# Patient Record
Sex: MALE | Race: WHITE | NOT HISPANIC OR LATINO | Employment: FULL TIME | URBAN - METROPOLITAN AREA
[De-identification: names, ages, dates, MRNs, and addresses within clinical notes are randomized per-mention and may not be internally consistent; named-entity substitution may affect disease eponyms.]

---

## 2017-02-02 ENCOUNTER — OFFICE VISIT (OUTPATIENT)
Dept: PHYSICAL MEDICINE AND REHAB | Facility: CLINIC | Age: 22
End: 2017-02-02

## 2017-02-02 VITALS
DIASTOLIC BLOOD PRESSURE: 70 MMHG | BODY MASS INDEX: 26.6 KG/M2 | HEIGHT: 71 IN | HEART RATE: 87 BPM | WEIGHT: 190 LBS | SYSTOLIC BLOOD PRESSURE: 120 MMHG

## 2017-02-02 DIAGNOSIS — G43.719 INTRACTABLE CHRONIC MIGRAINE WITHOUT AURA AND WITHOUT STATUS MIGRAINOSUS: Primary | ICD-10-CM

## 2017-02-02 ASSESSMENT — PAIN SCALES - GENERAL: PAINLEVEL: NO PAIN (0)

## 2017-02-02 NOTE — PROGRESS NOTES
"BOTULINUM TOXIN PROCEDURE - HEADACHE - NOTE    Chief Complaint   Patient presents with     Headache     BOTOX- CHRONIC MIGRAINES       /70 mmHg  Pulse 87  Ht 1.803 m (5' 11\")  Wt 86.183 kg (190 lb)  BMI 26.51 kg/m2       Current outpatient prescriptions:      OnabotulinumtoxinA (BOTOX IJ), Inject 135 Units into the muscle once Lot: /C3 Exp: 06/2019, Disp: , Rfl:      ONABOTULINUMTOXINA IJ, Inject 135 Units as directed Lot: [id number] Exp: Mar 2019, Disp: , Rfl:      SUMAtriptan (IMITREX STATDOSE) 6 MG/0.5ML pen injector kit, Inject 6 mg may repeat after 2 hours if needed; MAX 6 mg/dose and 12 mg/24 hours. Limit use to two days per week., Disp: 1 kit, Rfl: 6     promethazine (PHENERGAN) 25 MG suppository, Place 1 suppository (25 mg) rectally every 6 hours as needed for nausea, Disp: 30 suppository, Rfl: 6     methylPREDNISolone (MEDROL DOSEPAK) 4 MG tablet, Follow package instructions, Disp: 21 tablet, Rfl: 0     SUMAtriptan (IMITREX) 100 MG tablet, Take 1 tablet (100 mg) by mouth at onset of headache for migraine Take 1 tablet (100 mg) by mouth at onset of headache for migraine. May repeat in 2 hours. Max 200 mg in 24 hours. Limit use to 2 days per week., Disp: 12 tablet, Rfl: 3     OnabotulinumtoxinA (BOTOX IJ), Inject 135 Units as directed Lot# /C3, Exp 11/2018, Disp: , Rfl:      OnabotulinumtoxinA (BOTOX IJ), Inject 135 Units as directed Lot# /C3, Exp 04/2018, Disp: , Rfl:      OnabotulinumtoxinA (BOTOX IJ), Inject 125 Units into the muscle Lot # /C3  Exp: 1/2018, Disp: , Rfl:      OnabotulinumtoxinA (BOTOX IJ), Inject 135 Units as directed Lot# /C3, Exp 09/2017, Disp: , Rfl:      naproxen sodium (ANAPROX) 550 MG tablet, Take 1 tablet (550 mg) by mouth 2 times daily as needed for moderate pain, Disp: 30 tablet, Rfl: 0     ondansetron (ZOFRAN-ODT) 4 MG disintegrating tablet, Take 1 tablet (4 mg) by mouth every 8 hours as needed for nausea, Disp: 30 tablet, Rfl: 3     amitriptyline " (ELAVIL) 10 MG tablet, Take 1 tablet (10 mg) by mouth At Bedtime, Disp: 30 tablet, Rfl: 1     OnabotulinumtoxinA (BOTOX IJ), Inject 125 Units into the muscle Lot # /C3  Exp: 4/2017, Disp: , Rfl:      guaiFENesin-codeine (ROBITUSSIN AC) 100-10 MG/5ML SOLN, Take 10 mLs by mouth every 4 hours as needed for cough, Disp: 120 mL, Rfl: 0     OnabotulinumtoxinA (BOTOX IJ), Inject 125 Units as directed Lot# /C3, Exp 01/2017, Disp: , Rfl:      OnabotulinumtoxinA (BOTOX IJ), Inject 125 Units as directed Lot# /C3, Exp 10/2016, Disp: , Rfl:      No Known Allergies     PHYSICAL EXAM:    Denies headache today.  Last headache was Saturday rated 7/10.  Took IM Imitrex.  Resolved.    HPI:    Patient reports the following new medical problems since last visit: Cold, resolved.    We reviewed the recommended safety guidelines for  Botox from any vaccine injection, such as the seasonal flu vaccine, by a minimum of 10-14 days with Jose Eduardo Ryan. He acknowledged understanding.    RESPONSE TO PREVIOUS TREATMENT:  Change in headache pattern following last series of injections with 125 units of  Botox on 11/9/2016    No problems reported    1.  Headache frequency during this injection cycle:  2016 headache days per month.  This is compared to his baseline headache frequency of daily headache days per month.     2.  Headache duration during this injection cycle:  Headache duration ranged from 2  hours to 4  hours.     3.  Headache intensity during this injection cycle:    A.  5/10  =  Typical pain level.  B.  10/10  =  Worst pain level.  C.  0/10  =  Lowest pain level.    4.  Change in headache medication usage during this injection cycle:  (For Example:  Able to decrease use of oral pain medications.) Same use of oral meds as usual.    5.  ER Visits During This Injection Cycle:  1    6.  Functional Performance:  Change in ADL's, social interaction, days lost from work, etc. Had to cancel some activities due to  headache.    BOTULINUM NEUROTOXIN INJECTION PROCEDURES:      VERIFICATION OF PATIENT IDENTIFICATION AND PROCEDURE     Initials   Patient Name lmd   Patient  lmd   Procedure Verified by: lmd     Prior to the start of the procedure and with procedural staff participation, I verbally confirmed the patient s identity using two indicators, relevant allergies, that the procedure was appropriate and matched the consent or emergent situation, and that the correct equipment/implants were available. Immediately prior to starting the procedure I conducted the Time Out with the procedural staff and re-confirmed the patient s name, procedure, and site/side. (The Joint Commission universal protocol was followed.)  Yes    Sedation (Moderate or Deep): None    Above assessments performed by:  Iwona Lopez RN Care Coordinator    Errol Coy MD      INDICATIONS FOR PROCEDURES:  Jose Eduardo Ryan is a 21 year old male patient with chronic migraine headaches associated with cervicogenic components.  Jose Eduardo reports overall improvement in his migraine headache pattern, with fewer, less intense headaches since starting Botox.    His baseline symptoms have been recalcitrant to oral medications and conservative therapy. He is here today for reinjection with Botox.    GOAL OF PROCEDURE:  The goal of this procedure is to decrease pain .    TOTAL DOSE ADMINISTERED:  Dose Administered:  135 units  Botox (Botulinum Toxin Type A)       2:1 Dilution   Diluent Used:  0.5% Sensorcaine  Total Volume of Diluent Used:  3.70 ml  Lot # /C3 with Expiration Date:  2019  NDC #: Botox 100u ([address]-35)    Medication guide was offered to patient and was declined.    CONSENT:  The risks, benefits, and treatment options were discussed with Jose Eduardo Ryan and he agreed to proceed.    Written consent was obtained by lmd.     EQUIPMENT USED:  Needle-37mm stimulating/recording  Needle-30 gauge  EMG/NCS Machine    SKIN PREPARATION:  Skin preparation  was performed using an alcohol wipe.    GUIDANCE DESCRIPTION:  Electro-myographic guidance was necessary throughout the cervical portion of the procedure to accurately identify all areas of spastic muscles while avoiding injection of non-spastic muscles, neighboring nerves and nearby vascular structures.     AREA/MUSCLE INJECTED:  135 units of Botox  1 & 2. SHOULDER GIRDLE & NECK MUSCLES: 25 units Botox = Total Dose, 2:1 Dilution   Right Middle Trapezius (mid cervical) - 2.5 units of Botox at 1 site/s.   Left Middle Trapezius (mid cervical) - 2.5 units of Botox at 1 site/s.     Right Splenius - 5 units of Botox at 1 site/s.   Left Splenius - 5 units of Botox at 1 site/s.     Right Levator Scapulae - 5 units of Botox at 1 site/s (shoulder muscles).   Left Levator Scapulae - 5 units of Botox at 1 site/s (shoulder muscles).    3. HEAD & SCALP MUSCLES: 110 units Botox = Total Dose, 2:1 Dilution  Right Frontalis - 20 units of Botox at 4 site/s.  Left Frontalis - 20 units of Botox at 4 site/s.    Right Temporalis - 25 units of Botox at 6 site/s.  Left Temporalis - 25 units of Botox at 6 site/s.    Right  - 5 units of Botox at 1 site/s.              Left  - 5 units of Botox at 1 site/s.    Procerus - 10 units of Botox at 1 site/s.    RESPONSE TO PROCEDURE:  Jose Eduardo Ryan tolerated the procedure well and there were no immediate complications.   He was allowed to recover for an appropriate period of time and was discharged home in stable condition.    FOLLOW UP:  Jose Eduardo Ryan was asked to follow up by phone in 7-14 days with Sharon Ding PT, Care Coordinator or Iwona Chris RN, Care Coordinator, to report his response to this series of injections.  Based on the patient's previous response to this therapy, Jose Eduardo Ryan was rescheduled for the next series of injections in 12 weeks.    PLAN (Medication Changes, Therapy Orders, Work or Disability Issues, etc.): Patient will continue to monitor  response to today's injections.

## 2017-02-02 NOTE — Clinical Note
"2/2/2017       RE: Jose Eduardo Ryan  [address] [place] [address]     Dear Colleague,    Thank you for referring your patient, Jose Eduardo Ryan, to the [facility] at [facility]. Please see a copy of my visit note below.    BOTULINUM TOXIN PROCEDURE - HEADACHE - NOTE    Chief Complaint   Patient presents with     Headache     BOTOX- CHRONIC MIGRAINES       /70 mmHg  Pulse 87  Ht 1.803 m (5' 11\")  Wt 86.183 kg (190 lb)  BMI 26.51 kg/m2       Current outpatient prescriptions:      OnabotulinumtoxinA (BOTOX IJ), Inject 135 Units into the muscle once Lot: /C3 Exp: 06/2019, Disp: , Rfl:      ONABOTULINUMTOXINA IJ, Inject 135 Units as directed Lot: [id number] Exp: Mar 2019, Disp: , Rfl:      SUMAtriptan (IMITREX STATDOSE) 6 MG/0.5ML pen injector kit, Inject 6 mg may repeat after 2 hours if needed; MAX 6 mg/dose and 12 mg/24 hours. Limit use to two days per week., Disp: 1 kit, Rfl: 6     promethazine (PHENERGAN) 25 MG suppository, Place 1 suppository (25 mg) rectally every 6 hours as needed for nausea, Disp: 30 suppository, Rfl: 6     methylPREDNISolone (MEDROL DOSEPAK) 4 MG tablet, Follow package instructions, Disp: 21 tablet, Rfl: 0     SUMAtriptan (IMITREX) 100 MG tablet, Take 1 tablet (100 mg) by mouth at onset of headache for migraine Take 1 tablet (100 mg) by mouth at onset of headache for migraine. May repeat in 2 hours. Max 200 mg in 24 hours. Limit use to 2 days per week., Disp: 12 tablet, Rfl: 3     OnabotulinumtoxinA (BOTOX IJ), Inject 135 Units as directed Lot# /C3, Exp 11/2018, Disp: , Rfl:      OnabotulinumtoxinA (BOTOX IJ), Inject 135 Units as directed Lot# /C3, Exp 04/2018, Disp: , Rfl:      OnabotulinumtoxinA (BOTOX IJ), Inject 125 Units into the muscle Lot # /C3  Exp: 1/2018, Disp: , Rfl:      OnabotulinumtoxinA (BOTOX IJ), Inject 135 Units as directed Lot# /C3, Exp 09/2017, Disp: , Rfl:      naproxen " sodium (ANAPROX) 550 MG tablet, Take 1 tablet (550 mg) by mouth 2 times daily as needed for moderate pain, Disp: 30 tablet, Rfl: 0     ondansetron (ZOFRAN-ODT) 4 MG disintegrating tablet, Take 1 tablet (4 mg) by mouth every 8 hours as needed for nausea, Disp: 30 tablet, Rfl: 3     amitriptyline (ELAVIL) 10 MG tablet, Take 1 tablet (10 mg) by mouth At Bedtime, Disp: 30 tablet, Rfl: 1     OnabotulinumtoxinA (BOTOX IJ), Inject 125 Units into the muscle Lot # /C3  Exp: 4/2017, Disp: , Rfl:      guaiFENesin-codeine (ROBITUSSIN AC) 100-10 MG/5ML SOLN, Take 10 mLs by mouth every 4 hours as needed for cough, Disp: 120 mL, Rfl: 0     OnabotulinumtoxinA (BOTOX IJ), Inject 125 Units as directed Lot# /C3, Exp 01/2017, Disp: , Rfl:      OnabotulinumtoxinA (BOTOX IJ), Inject 125 Units as directed Lot# /C3, Exp 10/2016, Disp: , Rfl:      No Known Allergies     PHYSICAL EXAM:    Denies headache today.  Last headache was Saturday rated 7/10.  Took IM Imitrex.  Resolved.    HPI:    Patient reports the following new medical problems since last visit: Cold, resolved.    We reviewed the recommended safety guidelines for  Botox from any vaccine injection, such as the seasonal flu vaccine, by a minimum of 10-14 days with Jose Eduardo Ryan. He acknowledged understanding.    RESPONSE TO PREVIOUS TREATMENT:  Change in headache pattern following last series of injections with 125 units of  Botox on 11/9/2016    No problems reported    1.  Headache frequency during this injection cycle:  2016 headache days per month.  This is compared to his baseline headache frequency of daily headache days per month.     2.  Headache duration during this injection cycle:  Headache duration ranged from 2  hours to 4  hours.     3.  Headache intensity during this injection cycle:    A.  5/10  =  Typical pain level.  B.  10/10  =  Worst pain level.  C.  0/10  =  Lowest pain level.    4.  Change in headache medication usage during this  injection cycle:  (For Example:  Able to decrease use of oral pain medications.) Same use of oral meds as usual.    5.  ER Visits During This Injection Cycle:  1    6.  Functional Performance:  Change in ADL's, social interaction, days lost from work, etc. Had to cancel some activities due to headache.    BOTULINUM NEUROTOXIN INJECTION PROCEDURES:      VERIFICATION OF PATIENT IDENTIFICATION AND PROCEDURE     Initials   Patient Name lmd   Patient  lmd   Procedure Verified by: lmd     Prior to the start of the procedure and with procedural staff participation, I verbally confirmed the patient s identity using two indicators, relevant allergies, that the procedure was appropriate and matched the consent or emergent situation, and that the correct equipment/implants were available. Immediately prior to starting the procedure I conducted the Time Out with the procedural staff and re-confirmed the patient s name, procedure, and site/side. (The Joint Commission universal protocol was followed.)  Yes    Sedation (Moderate or Deep): None    Above assessments performed by:  Iwona Lopez RN Care Coordinator    Errol Coy MD      INDICATIONS FOR PROCEDURES:  Jose Eduardo Ryan is a 21 year old male patient with chronic migraine headaches associated with cervicogenic components.  Jose Eduardo reports overall improvement in his migraine headache pattern, with fewer, less intense headaches since starting Botox.    His baseline symptoms have been recalcitrant to oral medications and conservative therapy. He is here today for reinjection with Botox.    GOAL OF PROCEDURE:  The goal of this procedure is to decrease pain .    TOTAL DOSE ADMINISTERED:  Dose Administered:  135 units  Botox (Botulinum Toxin Type A)       2:1 Dilution   Diluent Used:  0.5% Sensorcaine  Total Volume of Diluent Used:  3.70 ml  Lot # /C3 with Expiration Date:  2019  NDC #: Botox 100u ([address]-37)    Medication guide was offered to patient and was  declined.    CONSENT:  The risks, benefits, and treatment options were discussed with Jose Eduardo Ryan and he agreed to proceed.    Written consent was obtained by lmd.     EQUIPMENT USED:  Needle-37mm stimulating/recording  Needle-30 gauge  EMG/NCS Machine    SKIN PREPARATION:  Skin preparation was performed using an alcohol wipe.    GUIDANCE DESCRIPTION:  Electro-myographic guidance was necessary throughout the cervical portion of the procedure to accurately identify all areas of spastic muscles while avoiding injection of non-spastic muscles, neighboring nerves and nearby vascular structures.     AREA/MUSCLE INJECTED:  135 units of Botox  1 & 2. SHOULDER GIRDLE & NECK MUSCLES: 25 units Botox = Total Dose, 2:1 Dilution   Right Middle Trapezius (mid cervical) - 2.5 units of Botox at 1 site/s.   Left Middle Trapezius (mid cervical) - 2.5 units of Botox at 1 site/s.     Right Splenius - 5 units of Botox at 1 site/s.   Left Splenius - 5 units of Botox at 1 site/s.     Right Levator Scapulae - 5 units of Botox at 1 site/s (shoulder muscles).   Left Levator Scapulae - 5 units of Botox at 1 site/s (shoulder muscles).    3. HEAD & SCALP MUSCLES: 110 units Botox = Total Dose, 2:1 Dilution  Right Frontalis - 20 units of Botox at 4 site/s.  Left Frontalis - 20 units of Botox at 4 site/s.    Right Temporalis - 25 units of Botox at 6 site/s.  Left Temporalis - 25 units of Botox at 6 site/s.    Right  - 5 units of Botox at 1 site/s.              Left  - 5 units of Botox at 1 site/s.    Procerus - 10 units of Botox at 1 site/s.    RESPONSE TO PROCEDURE:  Jose Eduardo Ryan tolerated the procedure well and there were no immediate complications.   He was allowed to recover for an appropriate period of time and was discharged home in stable condition.    FOLLOW UP:  Jose Eduardo Ryan was asked to follow up by phone in 7-14 days with Sharon Ding PT, Care Coordinator or Iwona Chris RN, Care Coordinator, to  report his response to this series of injections.  Based on the patient's previous response to this therapy, Jose Eduardo Ryan was rescheduled for the next series of injections in 12 weeks.    PLAN (Medication Changes, Therapy Orders, Work or Disability Issues, etc.): Patient will continue to monitor response to today's injections.    Again, thank you for allowing me to participate in the care of your patient.      Sincerely,    Errol Coy MD

## 2017-02-02 NOTE — NURSING NOTE
Chief Complaint   Patient presents with     RECHECK     BOTOX- CHRONIC MIGRAINES     JERONIMO ELIZALDE

## 2017-03-17 DIAGNOSIS — G43.111 INTRACTABLE MIGRAINE WITH AURA WITH STATUS MIGRAINOSUS: ICD-10-CM

## 2017-03-17 RX ORDER — CEFUROXIME AXETIL 250 MG/1
TABLET ORAL
Qty: 1 KIT | Refills: 11 | Status: SHIPPED | OUTPATIENT
Start: 2017-03-17 | End: 2017-07-13

## 2017-04-05 ENCOUNTER — TELEPHONE (OUTPATIENT)
Dept: NEUROLOGY | Facility: CLINIC | Age: 22
End: 2017-04-05

## 2017-04-05 ENCOUNTER — CARE COORDINATION (OUTPATIENT)
Dept: PHYSICAL MEDICINE AND REHAB | Facility: CLINIC | Age: 22
End: 2017-04-05

## 2017-04-05 DIAGNOSIS — G43.101 MIGRAINE WITH AURA AND WITH STATUS MIGRAINOSUS, NOT INTRACTABLE: Primary | ICD-10-CM

## 2017-04-05 RX ORDER — METHYLPREDNISOLONE 4 MG
4 TABLET, DOSE PACK ORAL SEE ADMIN INSTRUCTIONS
Qty: 21 TABLET | Refills: 0
Start: 2017-04-05 | End: 2017-07-13

## 2017-04-05 NOTE — PROGRESS NOTES
Received call from Pt's mother Lo reporting Pt has had severe migraine headache for the last few days and, in spite of using Imitrex, has had no relief.  Pt is now vomiting from excessive pain.  Lo requests an order for Medrol Dose Baldemar as this has broken his headache cycle before.  Tanna Menendez was not available so script authorized by Dr. Errol Coy for Medrol Dose Baldemar, 4 mg, 21 tablets take as instructed and was called to [facility] [place] [phone].

## 2017-04-06 ENCOUNTER — TELEPHONE (OUTPATIENT)
Dept: NEUROLOGY | Facility: CLINIC | Age: 22
End: 2017-04-06

## 2017-04-06 NOTE — TELEPHONE ENCOUNTER
Called and spoke to Lo, patient's mother who called our Clinic on 4/5/2016 reporting Jose Eduardo's headache worsening. Medrol pack was prescribed by Dr Coy yesterday. Patient took his first dose of Medrol pack and headache is better so he was able to go to work. Patient's mother appreciates the call.

## 2017-04-27 ENCOUNTER — OFFICE VISIT (OUTPATIENT)
Dept: PHYSICAL MEDICINE AND REHAB | Facility: CLINIC | Age: 22
End: 2017-04-27

## 2017-04-27 VITALS
SYSTOLIC BLOOD PRESSURE: 143 MMHG | WEIGHT: 193 LBS | BODY MASS INDEX: 27.02 KG/M2 | HEIGHT: 71 IN | DIASTOLIC BLOOD PRESSURE: 86 MMHG | HEART RATE: 83 BPM

## 2017-04-27 DIAGNOSIS — G43.719 INTRACTABLE CHRONIC MIGRAINE WITHOUT AURA AND WITHOUT STATUS MIGRAINOSUS: Primary | ICD-10-CM

## 2017-04-27 ASSESSMENT — PAIN SCALES - GENERAL: PAINLEVEL: NO PAIN (0)

## 2017-04-27 NOTE — MR AVS SNAPSHOT
After Visit Summary   4/27/2017    Jose Eduardo Ryan    MRN: [id number]           Patient Information     Date Of Birth          1995        Visit Information        Provider Department      4/27/2017 12:30 PM Errol Coy MD [facility] Physical Medicine and Rehabilitation         Follow-ups after your visit        Follow-up notes from your care team     Return in about 12 weeks (around 7/20/2017) for Headache.      Your next 10 appointments already scheduled     Jul 13, 2017  1:10 PM CDT   (Arrive by 12:55 PM)   Return Botox with Errol Coy MD   [facility] Physical Medicine and Rehabilitation ([facility])    [address] [place] [id number] [phone]            Oct 05, 2017  1:10 PM CDT   (Arrive by 12:55 PM)   Return Botox with Errol Coy MD   [facility] Rehabilitation ([facility])    [address] [place] [id number] [phone]              Who to contact     Please call your clinic at [phone] to:    Ask questions about your health    Make or cancel appointments    Discuss your medicines    Learn about your test results    Speak to your doctor   If you have compliments or concerns about an experience at your clinic, or if you wish to file a complaint, please contact [facility] Patient Relations at [phone] or email us at [email or web address]         Additional Information About Your Visit        MyChart Information     ApiFix is an electronic gateway that provides easy, online access to your medical records. With ApiFix, you can request a clinic appointment, read your test results, renew a prescription or communicate with your care team.     To sign up for [employer] visit the website at [email or web address]   You will be asked to enter the access code listed below, as well as some personal  "information. Please follow the directions to create your username and password.     Your access code is: [email or web address]  Expires: 2017  1:03 PM     Your access code will  in 90 days. If you need help or a new code, please contact your [facility] or call [phone] for assistance.        Care EveryWhere ID     This is your Care EveryWhere ID. This could be used by other organizations to access your [facility] medical records  [id number]        Your Vitals Were     Pulse Height BMI (Body Mass Index)             83 1.803 m (5' 11\") 26.92 kg/m2          Blood Pressure from Last 3 Encounters:   17 143/86   17 120/70   16 123/74    Weight from Last 3 Encounters:   17 87.5 kg (193 lb)   17 86.2 kg (190 lb)   16 84.3 kg (185 lb 14.4 oz)              Today, you had the following     No orders found for display       Primary Care Provider    [facility]       No address on file        Thank you!     Thank you for choosing [facility] PHYSICAL MEDICINE AND REHABILITATION  for your care. Our goal is always to provide you with excellent care. Hearing back from our patients is one way we can continue to improve our services. Please take a few minutes to complete the written survey that you may receive in the mail after your visit with us. Thank you!             Your Updated Medication List - Protect others around you: Learn how to safely use, store and throw away your medicines at www.disposemymeds.org.          This list is accurate as of: 17  1:03 PM.  Always use your most recent med list.                   Brand Name Dispense Instructions for use    amitriptyline 10 MG tablet    ELAVIL    30 tablet    Take 1 tablet (10 mg) by mouth At Bedtime       * BOTOX IJ      Inject 125 Units as directed Lot# /C3, Exp 10/2016       * BOTOX IJ      Inject 125 Units as directed Lot# /C3, Exp 2017       * BOTOX IJ      Inject 125 " Units into the muscle Lot # /C3  Exp: 4/2017       * BOTOX IJ      Inject 135 Units as directed Lot# /C3, Exp 09/2017       * BOTOX IJ      Inject 125 Units into the muscle Lot # /C3  Exp: 1/2018       * BOTOX IJ      Inject 135 Units as directed Lot# /C3, Exp 04/2018       * BOTOX IJ      Inject 135 Units as directed Lot# /C3, Exp 11/2018       * ONABOTULINUMTOXINA IJ      Inject 135 Units as directed Lot: [id number] Exp: Mar 2019       * BOTOX IJ      Inject 135 Units into the muscle once Lot: /C3 Exp: 06/2019       * BOTOX IJ      Inject 135 Units into the muscle Lot 3 /C3 Exp: 8/2019       * BOTOX IJ      Inject 135 Units into the muscle Lot # /C3  Exp: 10/2019       guaiFENesin-codeine 100-10 MG/5ML Soln solution    ROBITUSSIN AC    120 mL    Take 10 mLs by mouth every 4 hours as needed for cough       * methylPREDNISolone 4 MG tablet    MEDROL DOSEPAK    21 tablet    Follow package instructions       * methylPREDNISolone 4 MG tablet    MEDROL    21 tablet    Take 1 tablet (4 mg) by mouth See Admin Instructions follow package directions       naproxen sodium 550 MG tablet    ANAPROX    30 tablet    Take 1 tablet (550 mg) by mouth 2 times daily as needed for moderate pain       ondansetron 4 MG ODT tab    ZOFRAN-ODT    30 tablet    Take 1 tablet (4 mg) by mouth every 8 hours as needed for nausea       promethazine 25 MG Suppository    PHENERGAN    30 suppository    Place 1 suppository (25 mg) rectally every 6 hours as needed for nausea       * SUMAtriptan 100 MG tablet    IMITREX    12 tablet    Take 1 tablet (100 mg) by mouth at onset of headache for migraine Take 1 tablet (100 mg) by mouth at onset of headache for migraine. May repeat in 2 hours. Max 200 mg in 24 hours. Limit use to 2 days per week.       * SUMAtriptan 6 MG/0.5ML pen injector kit    IMITREX STATDOSE    1 kit    Inject 6 mg may repeat after 2 hours if needed; MAX 6 mg/dose and 12 mg/24 hours. Limit use to no  more than two days per week.       * Notice:  This list has 15 medication(s) that are the same as other medications prescribed for you. Read the directions carefully, and ask your doctor or other care provider to review them with you.

## 2017-04-27 NOTE — PROGRESS NOTES
"BOTULINUM TOXIN PROCEDURE - HEADACHE - NOTE    Chief Complaint   Patient presents with     Headache     BOTOX Migraine Headache       /86  Pulse 83  Ht 1.803 m (5' 11\")  Wt 87.5 kg (193 lb)  BMI 26.92 kg/m2       Current Outpatient Prescriptions:      methylPREDNISolone (MEDROL) 4 MG tablet, Take 1 tablet (4 mg) by mouth See Admin Instructions follow package directions, Disp: 21 tablet, Rfl: 0     SUMAtriptan (IMITREX STATDOSE) 6 MG/0.5ML pen injector kit, Inject 6 mg may repeat after 2 hours if needed; MAX 6 mg/dose and 12 mg/24 hours. Limit use to no more than two days per week., Disp: 1 kit, Rfl: 11     OnabotulinumtoxinA (BOTOX IJ), Inject 135 Units into the muscle Lot 3 /C3 Exp: 8/2019, Disp: , Rfl:      OnabotulinumtoxinA (BOTOX IJ), Inject 135 Units into the muscle once Lot: /C3 Exp: 06/2019, Disp: , Rfl:      ONABOTULINUMTOXINA IJ, Inject 135 Units as directed Lot: [id number] Exp: Mar 2019, Disp: , Rfl:      promethazine (PHENERGAN) 25 MG suppository, Place 1 suppository (25 mg) rectally every 6 hours as needed for nausea, Disp: 30 suppository, Rfl: 6     methylPREDNISolone (MEDROL DOSEPAK) 4 MG tablet, Follow package instructions, Disp: 21 tablet, Rfl: 0     SUMAtriptan (IMITREX) 100 MG tablet, Take 1 tablet (100 mg) by mouth at onset of headache for migraine Take 1 tablet (100 mg) by mouth at onset of headache for migraine. May repeat in 2 hours. Max 200 mg in 24 hours. Limit use to 2 days per week., Disp: 12 tablet, Rfl: 3     OnabotulinumtoxinA (BOTOX IJ), Inject 135 Units as directed Lot# /C3, Exp 11/2018, Disp: , Rfl:      OnabotulinumtoxinA (BOTOX IJ), Inject 135 Units as directed Lot# /C3, Exp 04/2018, Disp: , Rfl:      OnabotulinumtoxinA (BOTOX IJ), Inject 125 Units into the muscle Lot # /C3  Exp: 1/2018, Disp: , Rfl:      OnabotulinumtoxinA (BOTOX IJ), Inject 135 Units as directed Lot# /C3, Exp 09/2017, Disp: , Rfl:      naproxen sodium (ANAPROX) 550 MG tablet, " Take 1 tablet (550 mg) by mouth 2 times daily as needed for moderate pain, Disp: 30 tablet, Rfl: 0     ondansetron (ZOFRAN-ODT) 4 MG disintegrating tablet, Take 1 tablet (4 mg) by mouth every 8 hours as needed for nausea, Disp: 30 tablet, Rfl: 3     amitriptyline (ELAVIL) 10 MG tablet, Take 1 tablet (10 mg) by mouth At Bedtime, Disp: 30 tablet, Rfl: 1     OnabotulinumtoxinA (BOTOX IJ), Inject 125 Units into the muscle Lot # /C3  Exp: 4/2017, Disp: , Rfl:      guaiFENesin-codeine (ROBITUSSIN AC) 100-10 MG/5ML SOLN, Take 10 mLs by mouth every 4 hours as needed for cough, Disp: 120 mL, Rfl: 0     OnabotulinumtoxinA (BOTOX IJ), Inject 125 Units as directed Lot# /C3, Exp 01/2017, Disp: , Rfl:      OnabotulinumtoxinA (BOTOX IJ), Inject 125 Units as directed Lot# /C3, Exp 10/2016, Disp: , Rfl:      No Known Allergies     PHYSICAL EXAM:    Pt denies headache today.  Last migraine headache 2 weeks ago, rated 9/10 took Imitrex with relief.    HPI:    Patient denies new medical diagnoses, illnesses, hospitalizations, emergency room visits, and injuries since the previous injection with botulinum neurotoxin.    We reviewed the recommended safety guidelines for  Botox from any vaccine injection, such as the seasonal flu vaccine, by a minimum of 10-14 days with Jose Eduardo Ryan. He acknowledged understanding.    RESPONSE TO PREVIOUS TREATMENT:  Change in headache pattern following last series of injections with 135 units of  Botox on 2/2/2017.  Pt reports regularly he has good relief up until approx 3-4 weeks before next Botox injections where, for about 2 weeks, headache frequency and intensity increase.  Then headaches again subside until he returns for subsequent Botox injections.      Post-procedural headache: Rated as 'Mild' severity.  Duration: few  days resolved   Injection site pain rates mild lasts a few days then resolves.     1.  Headache frequency during this injection cycle:  3-4 headache days  per month.  This is compared to his baseline headache frequency of daily headache days per month.     2.  Headache duration during this injection cycle:  Headache duration ranged from 1 hours to 8-9 hours.      3.  Headache intensity during this injection cycle:    A.  5/10  =  Typical pain level.  B.  9/10  =  Worst pain level.  C.  0/10  =  Lowest pain level.    4.  Change in headache medication usage during this injection cycle:  (For Example:  Able to decrease use of oral pain medications.) No change in use of pain medication    5.  ER Visits During This Injection Cycle:  0    6.  Functional Performance:  Change in ADL's, social interaction, days lost from work, etc. Patient reports 1 days lost from work due to headache during this injection cycle  Had to leave early from work one day.    BOTULINUM NEUROTOXIN INJECTION PROCEDURES:      VERIFICATION OF PATIENT IDENTIFICATION AND PROCEDURE     Initials   Patient Name lmd   Patient  lmd   Procedure Verified by: nicolas     Prior to the start of the procedure and with procedural staff participation, I verbally confirmed the patient s identity using two indicators, relevant allergies, that the procedure was appropriate and matched the consent or emergent situation, and that the correct equipment/implants were available. Immediately prior to starting the procedure I conducted the Time Out with the procedural staff and re-confirmed the patient s name, procedure, and site/side. (The Joint Commission universal protocol was followed.)  Yes    Sedation (Moderate or Deep): None    Above assessments performed by:  Iwona Lopez RN Care Coordinator    Errol Coy MD      INDICATIONS FOR PROCEDURES:  Jose Eduardo Ryan is a 22 year old male patient with chronic migraine headaches associated with cervicogenic components.  Jose Eduardo reports overall improvement in his migraine headache pattern, with fewer, less intense headaches since starting Botox.     His baseline symptoms have  been recalcitrant to oral medications and conservative therapy. He is here today for reinjection with Botox.     GOAL OF PROCEDURE:  The goal of this procedure is to decrease pain .    TOTAL DOSE ADMINISTERED:  Dose Administered:  135 units  Botox (Botulinum Toxin Type A)       2:1 Dilution   Diluent Used:  0.5% Sensorcaine -  (NDC:5877-6026-23) (Lot # [id number], Exp: 8/1/2018)  Total Volume of Diluent Used:  2.70 ml  Lot # /C3 with Expiration Date:  10/2019  NDC #: Botox 100u ([address]-60)    Medication guide was offered to patient and was declined.    CONSENT:  The risks, benefits, and treatment options were discussed with Jose Eduardo Ryan and he agreed to proceed.    Written consent was obtained by lmd.     EQUIPMENT USED:  Needle-37mm stimulating/recording  Needle-30 gauge  EMG/NCS Machine     SKIN PREPARATION:  Skin preparation was performed using an alcohol wipe.     GUIDANCE DESCRIPTION:  Electro-myographic guidance was necessary throughout the cervical portion of the procedure to accurately identify all areas of spastic muscles while avoiding injection of non-spastic muscles, neighboring nerves and nearby vascular structures.     AREA/MUSCLE INJECTED:  135 Units  1 & 2. SHOULDER GIRDLE & NECK MUSCLES: 25 units Botox = Total Dose, 2:1 Dilution   Right Middle Trapezius (mid cervical) - 2.5 units of Botox at 1 site/s.   Left Middle Trapezius (mid cervical) - 2.5 units of Botox at 1 site/s.      Right Splenius - 5 units of Botox at 1 site/s.   Left Splenius - 5 units of Botox at 1 site/s.      Right Levator Scapulae - 5 units of Botox at 1 site/s (shoulder muscles).   Left Levator Scapulae - 5 units of Botox at 1 site/s (shoulder muscles).     3. HEAD & SCALP MUSCLES: 110 units Botox = Total Dose, 2:1 Dilution  Right Frontalis - 20 units of Botox at 4 site/s.  Left Frontalis - 20 units of Botox at 4 site/s.     Right Temporalis - 25 units of Botox at 6 site/s.  Left Temporalis - 25 units of Botox at 6  site/s.     Right  - 5 units of Botox at 1 site/s.              Left  - 5 units of Botox at 1 site/s.     Procerus - 10 units of Botox at 1 site/s.    RESPONSE TO PROCEDURE:  Jose Eduardo Ryan tolerated the procedure well and there were no immediate complications.   He was allowed to recover for an appropriate period of time and was discharged home in stable condition.    FOLLOW UP:  Jose Eduardo Ryan was asked to follow up by phone in 7-14 days with Sharon Ding PT, Care Coordinator or Iwona Chris RN, Care Coordinator, to report his response to this series of injections.  Based on the patient's previous response to this therapy, Jose Eduardo Ryan was rescheduled for the next series of injections in 12 weeks.    PLAN (Medication Changes, Therapy Orders, Work or Disability Issues, etc.): Patient will continue to monitor response to today's injections.     There were 15 units of Botox as unavoidable waste for this patient.

## 2017-04-27 NOTE — LETTER
"4/27/2017       RE: Jose Eduardo Ryan  [address] [place] [address]     Dear Colleague,    Thank you for referring your patient, Jose Eduardo Ryan, to the [facility] at [facility]. Please see a copy of my visit note below.    BOTULINUM TOXIN PROCEDURE - HEADACHE - NOTE    Chief Complaint   Patient presents with     Headache     BOTOX Migraine Headache       /86  Pulse 83  Ht 1.803 m (5' 11\")  Wt 87.5 kg (193 lb)  BMI 26.92 kg/m2       Current Outpatient Prescriptions:      methylPREDNISolone (MEDROL) 4 MG tablet, Take 1 tablet (4 mg) by mouth See Admin Instructions follow package directions, Disp: 21 tablet, Rfl: 0     SUMAtriptan (IMITREX STATDOSE) 6 MG/0.5ML pen injector kit, Inject 6 mg may repeat after 2 hours if needed; MAX 6 mg/dose and 12 mg/24 hours. Limit use to no more than two days per week., Disp: 1 kit, Rfl: 11     OnabotulinumtoxinA (BOTOX IJ), Inject 135 Units into the muscle Lot 3 /C3 Exp: 8/2019, Disp: , Rfl:      OnabotulinumtoxinA (BOTOX IJ), Inject 135 Units into the muscle once Lot: /C3 Exp: 06/2019, Disp: , Rfl:      ONABOTULINUMTOXINA IJ, Inject 135 Units as directed Lot: [id number] Exp: Mar 2019, Disp: , Rfl:      promethazine (PHENERGAN) 25 MG suppository, Place 1 suppository (25 mg) rectally every 6 hours as needed for nausea, Disp: 30 suppository, Rfl: 6     methylPREDNISolone (MEDROL DOSEPAK) 4 MG tablet, Follow package instructions, Disp: 21 tablet, Rfl: 0     SUMAtriptan (IMITREX) 100 MG tablet, Take 1 tablet (100 mg) by mouth at onset of headache for migraine Take 1 tablet (100 mg) by mouth at onset of headache for migraine. May repeat in 2 hours. Max 200 mg in 24 hours. Limit use to 2 days per week., Disp: 12 tablet, Rfl: 3     OnabotulinumtoxinA (BOTOX IJ), Inject 135 Units as directed Lot# /C3, Exp 11/2018, Disp: , Rfl:      OnabotulinumtoxinA (BOTOX IJ), Inject 135 Units as directed Lot# " /C3, Exp 04/2018, Disp: , Rfl:      OnabotulinumtoxinA (BOTOX IJ), Inject 125 Units into the muscle Lot # /C3  Exp: 1/2018, Disp: , Rfl:      OnabotulinumtoxinA (BOTOX IJ), Inject 135 Units as directed Lot# /C3, Exp 09/2017, Disp: , Rfl:      naproxen sodium (ANAPROX) 550 MG tablet, Take 1 tablet (550 mg) by mouth 2 times daily as needed for moderate pain, Disp: 30 tablet, Rfl: 0     ondansetron (ZOFRAN-ODT) 4 MG disintegrating tablet, Take 1 tablet (4 mg) by mouth every 8 hours as needed for nausea, Disp: 30 tablet, Rfl: 3     amitriptyline (ELAVIL) 10 MG tablet, Take 1 tablet (10 mg) by mouth At Bedtime, Disp: 30 tablet, Rfl: 1     OnabotulinumtoxinA (BOTOX IJ), Inject 125 Units into the muscle Lot # /C3  Exp: 4/2017, Disp: , Rfl:      guaiFENesin-codeine (ROBITUSSIN AC) 100-10 MG/5ML SOLN, Take 10 mLs by mouth every 4 hours as needed for cough, Disp: 120 mL, Rfl: 0     OnabotulinumtoxinA (BOTOX IJ), Inject 125 Units as directed Lot# /C3, Exp 01/2017, Disp: , Rfl:      OnabotulinumtoxinA (BOTOX IJ), Inject 125 Units as directed Lot# /C3, Exp 10/2016, Disp: , Rfl:      No Known Allergies     PHYSICAL EXAM:    Pt denies headache today.  Last migraine headache 2 weeks ago, rated 9/10 took Imitrex with relief.    HPI:    Patient denies new medical diagnoses, illnesses, hospitalizations, emergency room visits, and injuries since the previous injection with botulinum neurotoxin.    We reviewed the recommended safety guidelines for  Botox from any vaccine injection, such as the seasonal flu vaccine, by a minimum of 10-14 days with Jose Eduardo Ryan. He acknowledged understanding.    RESPONSE TO PREVIOUS TREATMENT:  Change in headache pattern following last series of injections with 135 units of  Botox on 2/2/2017.  Pt reports regularly he has good relief up until approx 3-4 weeks before next Botox injections where, for about 2 weeks, headache frequency and intensity increase.  Then  headaches again subside until he returns for subsequent Botox injections.      Post-procedural headache: Rated as 'Mild' severity.  Duration: few  days resolved   Injection site pain rates mild lasts a few days then resolves.     1.  Headache frequency during this injection cycle:  3-4 headache days per month.  This is compared to his baseline headache frequency of daily headache days per month.     2.  Headache duration during this injection cycle:  Headache duration ranged from 1 hours to 8-9 hours.      3.  Headache intensity during this injection cycle:    A.  5/10  =  Typical pain level.  B.  9/10  =  Worst pain level.  C.  0/10  =  Lowest pain level.    4.  Change in headache medication usage during this injection cycle:  (For Example:  Able to decrease use of oral pain medications.) No change in use of pain medication    5.  ER Visits During This Injection Cycle:  0    6.  Functional Performance:  Change in ADL's, social interaction, days lost from work, etc. Patient reports 1 days lost from work due to headache during this injection cycle  Had to leave early from work one day.    BOTULINUM NEUROTOXIN INJECTION PROCEDURES:      VERIFICATION OF PATIENT IDENTIFICATION AND PROCEDURE     Initials   Patient Name lmd   Patient  lmd   Procedure Verified by: nicolas     Prior to the start of the procedure and with procedural staff participation, I verbally confirmed the patient s identity using two indicators, relevant allergies, that the procedure was appropriate and matched the consent or emergent situation, and that the correct equipment/implants were available. Immediately prior to starting the procedure I conducted the Time Out with the procedural staff and re-confirmed the patient s name, procedure, and site/side. (The Joint Commission universal protocol was followed.)  Yes    Sedation (Moderate or Deep): None    Above assessments performed by:  Iwona Lopez RN Care Coordinator    Errol Coy  MD      INDICATIONS FOR PROCEDURES:  Jose Eduardo Ryan is a 22 year old male patient with chronic migraine headaches associated with cervicogenic components.  Jose Eduardo reports overall improvement in his migraine headache pattern, with fewer, less intense headaches since starting Botox.     His baseline symptoms have been recalcitrant to oral medications and conservative therapy. He is here today for reinjection with Botox.     GOAL OF PROCEDURE:  The goal of this procedure is to decrease pain .    TOTAL DOSE ADMINISTERED:  Dose Administered:  135 units  Botox (Botulinum Toxin Type A)       2:1 Dilution   Diluent Used:  0.5% Sensorcaine -  (NDC:4980-9623-72) (Lot # [id number], Exp: 8/1/2018)  Total Volume of Diluent Used:  2.70 ml  Lot # /C3 with Expiration Date:  10/2019  NDC #: Botox 100u ([address]-43)    Medication guide was offered to patient and was declined.    CONSENT:  The risks, benefits, and treatment options were discussed with Jose Eduardo Rayn and he agreed to proceed.    Written consent was obtained by lmd.     EQUIPMENT USED:  Needle-37mm stimulating/recording  Needle-30 gauge  EMG/NCS Machine     SKIN PREPARATION:  Skin preparation was performed using an alcohol wipe.     GUIDANCE DESCRIPTION:  Electro-myographic guidance was necessary throughout the cervical portion of the procedure to accurately identify all areas of spastic muscles while avoiding injection of non-spastic muscles, neighboring nerves and nearby vascular structures.     AREA/MUSCLE INJECTED:  135 Units  1 & 2. SHOULDER GIRDLE & NECK MUSCLES: 25 units Botox = Total Dose, 2:1 Dilution   Right Middle Trapezius (mid cervical) - 2.5 units of Botox at 1 site/s.   Left Middle Trapezius (mid cervical) - 2.5 units of Botox at 1 site/s.      Right Splenius - 5 units of Botox at 1 site/s.   Left Splenius - 5 units of Botox at 1 site/s.      Right Levator Scapulae - 5 units of Botox at 1 site/s (shoulder muscles).   Left Levator Scapulae - 5 units  of Botox at 1 site/s (shoulder muscles).     3. HEAD & SCALP MUSCLES: 110 units Botox = Total Dose, 2:1 Dilution  Right Frontalis - 20 units of Botox at 4 site/s.  Left Frontalis - 20 units of Botox at 4 site/s.     Right Temporalis - 25 units of Botox at 6 site/s.  Left Temporalis - 25 units of Botox at 6 site/s.     Right  - 5 units of Botox at 1 site/s.              Left  - 5 units of Botox at 1 site/s.     Procerus - 10 units of Botox at 1 site/s.    RESPONSE TO PROCEDURE:  Jose Eduardo Ryan tolerated the procedure well and there were no immediate complications.   He was allowed to recover for an appropriate period of time and was discharged home in stable condition.    FOLLOW UP:  Jose Eduardo Ryan was asked to follow up by phone in 7-14 days with Sharon Ding PT, Care Coordinator or Iwona Chris RN, Care Coordinator, to report his response to this series of injections.  Based on the patient's previous response to this therapy, Jose Eduardo Ryan was rescheduled for the next series of injections in 12 weeks.    PLAN (Medication Changes, Therapy Orders, Work or Disability Issues, etc.): Patient will continue to monitor response to today's injections.     There were 15 units of Botox as unavoidable waste for this patient.

## 2017-05-16 ENCOUNTER — TELEPHONE (OUTPATIENT)
Dept: NEUROLOGY | Facility: CLINIC | Age: 22
End: 2017-05-16

## 2017-05-16 NOTE — TELEPHONE ENCOUNTER
Called and spoke to the patient's mother who reported of Jose Eduardo's headache flare up. Called and spoke to Jose Eduardo who reported an acute migraine headache with onset this morning. Reports that he had headaches last week but this is the strongest so far and triggered by weather changes. Patient reports that he took sumatriptan injectable  this morning once which was not effective. Patient reports that he went to [facility] and refilled his sumatriptan and repeated sumatriptan injection. Now he reports that headache is slightly better after the second injection. Reports nausea and took promethazine suppository around 4-5 am. Did not repeat promethazine.   Recommended to repeat promethazine suppository 25 mg once, benadryl 25 mg and ibuprofen 800 mg once. Recommended trying to push fluids and saltines. Patient was asked to call us tomorrow if headache improved and follow up in the Clinic if no improvement. If no relief, will do ketorolac 30-60 mg IM tomorrow. Patient reports that ketorolac injection was effective in the past for him. Patient appreciates the call.

## 2017-05-18 ENCOUNTER — CARE COORDINATION (OUTPATIENT)
Dept: NEUROLOGY | Facility: CLINIC | Age: 22
End: 2017-05-18

## 2017-07-13 ENCOUNTER — OFFICE VISIT (OUTPATIENT)
Dept: NEUROLOGY | Facility: CLINIC | Age: 22
End: 2017-07-13

## 2017-07-13 ENCOUNTER — OFFICE VISIT (OUTPATIENT)
Dept: PHYSICAL MEDICINE AND REHAB | Facility: CLINIC | Age: 22
End: 2017-07-13

## 2017-07-13 VITALS — SYSTOLIC BLOOD PRESSURE: 122 MMHG | HEIGHT: 71 IN | DIASTOLIC BLOOD PRESSURE: 79 MMHG | HEART RATE: 67 BPM

## 2017-07-13 VITALS — SYSTOLIC BLOOD PRESSURE: 122 MMHG | HEART RATE: 67 BPM | HEIGHT: 71 IN | DIASTOLIC BLOOD PRESSURE: 79 MMHG

## 2017-07-13 DIAGNOSIS — G43.101 MIGRAINE WITH AURA AND WITH STATUS MIGRAINOSUS, NOT INTRACTABLE: Primary | ICD-10-CM

## 2017-07-13 DIAGNOSIS — G43.719 INTRACTABLE CHRONIC MIGRAINE WITHOUT AURA AND WITHOUT STATUS MIGRAINOSUS: Primary | ICD-10-CM

## 2017-07-13 DIAGNOSIS — G43.111 INTRACTABLE MIGRAINE WITH AURA WITH STATUS MIGRAINOSUS: ICD-10-CM

## 2017-07-13 RX ORDER — NAPROXEN 500 MG/1
500 TABLET ORAL EVERY 12 HOURS PRN
Qty: 60 TABLET | Refills: 3 | Status: SHIPPED | OUTPATIENT
Start: 2017-07-13 | End: 2018-05-04

## 2017-07-13 RX ORDER — PROMETHAZINE HYDROCHLORIDE 25 MG/1
25 SUPPOSITORY RECTAL EVERY 6 HOURS PRN
Qty: 30 SUPPOSITORY | Refills: 6 | Status: SHIPPED | OUTPATIENT
Start: 2017-07-13 | End: 2018-05-04

## 2017-07-13 RX ORDER — HYDROXYZINE PAMOATE 25 MG/1
25 CAPSULE ORAL EVERY 6 HOURS PRN
Qty: 20 CAPSULE | Refills: 1 | Status: SHIPPED | OUTPATIENT
Start: 2017-07-13 | End: 2017-12-28

## 2017-07-13 RX ORDER — SUMATRIPTAN SUCCINATE 6 MG/.5ML
6 INJECTION, SOLUTION SUBCUTANEOUS
Qty: 1 ML | Refills: 11 | Status: SHIPPED | OUTPATIENT
Start: 2017-07-13 | End: 2017-12-28

## 2017-07-13 RX ORDER — CEFUROXIME AXETIL 250 MG/1
TABLET ORAL
Qty: 1 KIT | Refills: 11 | Status: SHIPPED | OUTPATIENT
Start: 2017-07-13 | End: 2017-12-20

## 2017-07-13 ASSESSMENT — ENCOUNTER SYMPTOMS
MUSCLE CRAMPS: 0
WEAKNESS: 0
DISTURBANCES IN COORDINATION: 0
TREMORS: 0
JOINT SWELLING: 0
DIZZINESS: 1
LOSS OF CONSCIOUSNESS: 0
TINGLING: 0
MEMORY LOSS: 0
STIFFNESS: 0
MUSCLE WEAKNESS: 0
BACK PAIN: 0
PARALYSIS: 0
NECK PAIN: 1
HEADACHES: 1
ARTHRALGIAS: 0
SPEECH CHANGE: 0
SEIZURES: 0
MYALGIAS: 1
NUMBNESS: 0

## 2017-07-13 ASSESSMENT — PAIN SCALES - GENERAL
PAINLEVEL: NO PAIN (0)
PAINLEVEL: NO PAIN (0)

## 2017-07-13 NOTE — MR AVS SNAPSHOT
After Visit Summary   7/13/2017    Jose Eduardo Ryan    MRN: [id number]           Patient Information     Date Of Birth          1995        Visit Information        Provider Department      7/13/2017 11:00 AM Tanna Menendez APRN CNP [facility]        Today's Diagnoses     Migraine with aura and with status migrainosus, not intractable    -  1    Intractable migraine with aura with status migrainosus          Care Instructions    Plan:  Cefaly to research about.   Sumatriptan injectable as needed. May repeat in 2 hours. May take with promethazine suppository, hydroxyzine and naproxen delayed release as needed and instructed at the acute headache onset  May consider preventive treatment if headaches are more frequent or severe or need for ED treatment or or need for ED treatment or missing too much of your work  Quit smoking  Wean off caffeine  Continue Botox   Follow up in 3 months          Patient Education    Hydroxyzine Hydrochloride Oral solution    Hydroxyzine Hydrochloride Oral tablet    Hydroxyzine Hydrochloride Solution for injection    Hydroxyzine Pamoate Oral capsule    Hydroxyzine Pamoate Oral suspension  Hydroxyzine Hydrochloride Oral solution  What is this medicine?  HYDROXYZINE (nelly DROX i zeen) is an antihistamine. This medicine is used to treat allergy symptoms. It is also used to treat anxiety and tension. This medicine can be used with other medicines to induce sleep before surgery.  This medicine may be used for other purposes; ask your health care provider or pharmacist if you have questions.  What should I tell my health care provider before I take this medicine?  They need to know if you have any of these conditions:    any chronic illness    diabetes    difficulty passing urine    glaucoma    heart disease    kidney disease    liver disease    lung disease    an unusual or allergic reaction to hydroxyzine, cetirizine, other medicines, foods, dyes, or  preservatives    pregnant or trying to get pregnant    breast-feeding  How should I use this medicine?  Take this medicine by mouth with a full glass of water. Follow the directions on the prescription label. Use a specially marked spoon or dropper to measure every dose. Ask your pharmacist if you do not have one. Household spoons are not accurate. You may take this medicine with food or on an empty stomach. Take your medicine at regular intervals. Do not take your medicine more often than directed.  Talk to your pediatrician regarding the use of this medicine in children. Special care may be needed. While this drug may be prescribed for children as young as 2 years of age for selected conditions, precautions do apply.  Patients over 65 years old may have a stronger reaction and need a smaller dose.  Overdosage: If you think you have taken too much of this medicine contact a poison control center or emergency room at once.  NOTE: This medicine is only for you. Do not share this medicine with others.  What if I miss a dose?  If you miss a dose, take it as soon as you can. If it is almost time for your next dose, take only that dose. Do not take double or extra doses.  What may interact with this medicine?    alcohol    barbiturate medicines for sleep or seizures    medicines for colds, allergies    medicines for depression, anxiety, or emotional disturbances    medicines for pain    medicines for sleep    muscle relaxants  This list may not describe all possible interactions. Give your health care provider a list of all the medicines, herbs, non-prescription drugs, or dietary supplements you use. Also tell them if you smoke, drink alcohol, or use illegal drugs. Some items may interact with your medicine.  What should I watch for while using this medicine?  Tell your doctor or health care professional if your symptoms do not improve.  You may get drowsy or dizzy. Do not drive, use machinery, or do anything that needs  mental alertness until you know how this medicine affects you. Do not stand or sit up quickly, especially if you are an older patient. This reduces the risk of dizzy or fainting spells. Alcohol may interfere with the effect of this medicine. Avoid alcoholic drinks.  Your mouth may get dry. Chewing sugarless gum or sucking hard candy, and drinking plenty of water may help. Contact your doctor if the problem does not go away or is severe.  This medicine may cause dry eyes and blurred vision. If you wear contact lenses you may feel some discomfort. Lubricating drops may help. See your eye doctor if the problem does not go away or is severe.  If you are receiving skin tests for allergies, tell your doctor you are using this medicine.  What side effects may I notice from receiving this medicine?  Side effects that you should report to your doctor or health care professional as soon as possible:    difficulty passing urine    fast or irregular heartbeat    seizures    slurred speech or confusion    tremor  Side effects that usually do not require medical attention (report to your doctor or health care professional if they continue or are bothersome):    constipation    drowsiness    fatigue    headache    stomach upset  This list may not describe all possible side effects. Call your doctor for medical advice about side effects. You may report side effects to FDA at 4-458-FDA-6521.  Where should I keep my medicine?  Keep out of the reach of children.  Store at room temperature between 15 and 30 degrees C (59 and 86 degrees F). Do not freeze. Protect from light. Throw away any unused medicine after the expiration date.  NOTE:This sheet is a summary. It may not cover all possible information. If you have questions about this medicine, talk to your doctor, pharmacist, or health care provider. Copyright  2016 [employer] Standard                Follow-ups after your visit        Follow-up notes from your care team     Return in about  3 months (around 10/13/2017).      Your next 10 appointments already scheduled     Jul 13, 2017  1:10 PM CDT   (Arrive by 12:55 PM)   Return Botox with Errol Coy MD   [facility] ([facility])    [address] [place] [id number] [phone]            Oct 05, 2017  1:10 PM CDT   (Arrive by 12:55 PM)   Return Botox with Errol Coy MD   [facility] ([facility])    [address] [place] [id number] [phone]            Oct 05, 2017  2:00 PM CDT   (Arrive by 1:45 PM)   Return Visit with BRANDI Partida CNP   [facility] ([facility])    [address] [place] [address] [id number] [phone]              Who to contact     Please call your clinic at [phone] to:    Ask questions about your health    Make or cancel appointments    Discuss your medicines    Learn about your test results    Speak to your doctor   If you have compliments or concerns about an experience at your clinic, or if you wish to file a complaint, please contact [facility] Patient Relations at [phone] or email us at [email or web address]         Additional Information About Your Visit        MyChart Information     [employer] is an electronic gateway that provides easy, online access to your medical records. With [employer], you can request a clinic appointment, read your test results, renew a prescription or communicate with your care team.     To sign up for [employer] visit the website at [email or web address]   You will be asked to enter the access code listed below, as well as some personal information. Please follow the directions to create your username and password.     Your access code is: [email or web address]  Expires: 7/26/2017  1:03 PM     Your  "access code will  in 90 days. If you need help or a new code, please contact your [facility] or call [phone] for assistance.        Care EveryWhere ID     This is your Care EveryWhere ID. This could be used by other organizations to access your [place] medical records  [id number]        Your Vitals Were     Pulse Height                67 1.803 m (5' 11\")           Blood Pressure from Last 3 Encounters:   17 122/79   17 143/86   17 120/70    Weight from Last 3 Encounters:   17 87.5 kg (193 lb)   17 86.2 kg (190 lb)   16 84.3 kg (185 lb 14.4 oz)              Today, you had the following     No orders found for display         Today's Medication Changes          These changes are accurate as of: 17 12:17 PM.  If you have any questions, ask your nurse or doctor.               Start taking these medicines.        Dose/Directions    hydrOXYzine 25 MG capsule   Commonly known as:  VISTARIL   Used for:  Migraine with aura and with status migrainosus, not intractable   Started by:  Tanna Menendez APRN CNP        Dose:  25 mg   Take 1 capsule (25 mg) by mouth every 6 hours as needed (headache)   Quantity:  20 capsule   Refills:  1       naproxen 500 MG Tbec   Commonly known as:  naproxen   Used for:  Migraine with aura and with status migrainosus, not intractable   Started by:  Tanna Menendez APRN CNP        Dose:  500 mg   Take 500 mg by mouth every 12 hours as needed   Quantity:  60 tablet   Refills:  3         These medicines have changed or have updated prescriptions.        Dose/Directions    * SUMAtriptan 100 MG tablet   Commonly known as:  IMITREX   This may have changed:  Another medication with the same name was added. Make sure you understand how and when to take each.   Used for:  Migraine with intractable migraine, so stated, with status migrainosus   Changed by:  Baron Gibbs, RN        Dose:  " 100 mg   Take 1 tablet (100 mg) by mouth at onset of headache for migraine Take 1 tablet (100 mg) by mouth at onset of headache for migraine. May repeat in 2 hours. Max 200 mg in 24 hours. Limit use to 2 days per week.   Quantity:  12 tablet   Refills:  3       * SUMAtriptan 6 MG/0.5ML pen injector kit   Commonly known as:  IMITREX STATDOSE   This may have changed:  Another medication with the same name was added. Make sure you understand how and when to take each.   Used for:  Intractable migraine with aura with status migrainosus   Changed by:  Tanna Menendez APRN CNP        Inject 6 mg may repeat after 2 hours if needed; MAX 6 mg/dose and 12 mg/24 hours. Limit use to no more than two days per week.   Quantity:  1 kit   Refills:  11       * SUMAtriptan Succinate Refill 6 MG/0.5ML Soct   Commonly known as:  IMITREX   This may have changed:  You were already taking a medication with the same name, and this prescription was added. Make sure you understand how and when to take each.   Used for:  Migraine with aura and with status migrainosus, not intractable   Changed by:  Tanna Menendez APRN CNP        Dose:  6 mg   Inject 0.5 mLs (6 mg) Subcutaneous at onset of headache for migraine May repeat in 2 hours as needed. Max 12 mg/1ml in 24 hours   Quantity:  1 mL   Refills:  11       * Notice:  This list has 3 medication(s) that are the same as other medications prescribed for you. Read the directions carefully, and ask your doctor or other care provider to review them with you.      Stop taking these medicines if you haven't already. Please contact your care team if you have questions.     naproxen sodium 550 MG tablet   Commonly known as:  ANAPROX   Stopped by:  Tanna Menendez APRN CNP                Where to get your medicines      These medications were sent to [facility] [address] [place] [address] [id number],  [facility] [address]     Phone:  [phone]     hydrOXYzine 25 MG capsule    naproxen 500 MG Tbec    promethazine 25 MG Suppository    SUMAtriptan 6 MG/0.5ML pen injector kit    SUMAtriptan Succinate Refill 6 MG/0.5ML Soct                Primary Care Provider    [facility]       No address on file        Equal Access to Services     JACKIEEDWIN CLEMENTE : Hadii aad ku hadasho Soomaali, waaxda luqadaha, qaybta kaalmada adeegyada, waxay idiin hayaan adeeg khneenash laMaryaan . So [facility] [phone].    ATENCIÓN: Si habla español, tiene a lovett disposición servicios gratuitos de asistencia lingüística. Llame al [phone].    We comply with applicable federal civil rights laws and [place] laws. We do not discriminate on the basis of race, color, national origin, age, disability sex, sexual orientation or gender identity.            Thank you!     Thank you for choosing [facility]  for your care. Our goal is always to provide you with excellent care. Hearing back from our patients is one way we can continue to improve our services. Please take a few minutes to complete the written survey that you may receive in the mail after your visit with us. Thank you!             Your Updated Medication List - Protect others around you: Learn how to safely use, store and throw away your medicines at www.disposemymeds.org.          This list is accurate as of: 7/13/17 12:17 PM.  Always use your most recent med list.                   Brand Name Dispense Instructions for use Diagnosis    amitriptyline 10 MG tablet    ELAVIL    30 tablet    Take 1 tablet (10 mg) by mouth At Bedtime    Intractable migraine with status migrainosus       * BOTOX IJ      Inject 125 Units as directed Lot# /C3, Exp 10/2016        * BOTOX IJ      Inject 125 Units as directed Lot# /C3, Exp 01/2017        * BOTOX IJ      Inject 125 Units into the muscle Lot # /C3  Exp: 4/2017        * BOTOX IJ      Inject 135 Units as  directed Lot# /C3, Exp 09/2017        * BOTOX IJ      Inject 125 Units into the muscle Lot # /C3  Exp: 1/2018        * BOTOX IJ      Inject 135 Units as directed Lot# /C3, Exp 04/2018        * BOTOX IJ      Inject 135 Units as directed Lot# /C3, Exp 11/2018        * ONABOTULINUMTOXINA IJ      Inject 135 Units as directed Lot: [id number] Exp: Mar 2019        * BOTOX IJ      Inject 135 Units into the muscle once Lot: /C3 Exp: 06/2019        * BOTOX IJ      Inject 135 Units into the muscle Lot 3 /C3 Exp: 8/2019        * BOTOX IJ      Inject 135 Units into the muscle Lot # /C3  Exp: 10/2019        guaiFENesin-codeine 100-10 MG/5ML Soln solution    ROBITUSSIN AC    120 mL    Take 10 mLs by mouth every 4 hours as needed for cough    Cough       hydrOXYzine 25 MG capsule    VISTARIL    20 capsule    Take 1 capsule (25 mg) by mouth every 6 hours as needed (headache)    Migraine with aura and with status migrainosus, not intractable       naproxen 500 MG Tbec    naproxen    60 tablet    Take 500 mg by mouth every 12 hours as needed    Migraine with aura and with status migrainosus, not intractable       promethazine 25 MG Suppository    PHENERGAN    30 suppository    Place 1 suppository (25 mg) rectally every 6 hours as needed for nausea    Intractable migraine with aura with status migrainosus       * SUMAtriptan 100 MG tablet    IMITREX    12 tablet    Take 1 tablet (100 mg) by mouth at onset of headache for migraine Take 1 tablet (100 mg) by mouth at onset of headache for migraine. May repeat in 2 hours. Max 200 mg in 24 hours. Limit use to 2 days per week.    Migraine with intractable migraine, so stated, with status migrainosus       * SUMAtriptan 6 MG/0.5ML pen injector kit    IMITREX STATDOSE    1 kit    Inject 6 mg may repeat after 2 hours if needed; MAX 6 mg/dose and 12 mg/24 hours. Limit use to no more than two days per week.    Intractable migraine with aura with status migrainosus        * SUMAtriptan Succinate Refill 6 MG/0.5ML Soct    IMITREX    1 mL    Inject 0.5 mLs (6 mg) Subcutaneous at onset of headache for migraine May repeat in 2 hours as needed. Max 12 mg/1ml in 24 hours    Migraine with aura and with status migrainosus, not intractable       * Notice:  This list has 14 medication(s) that are the same as other medications prescribed for you. Read the directions carefully, and ask your doctor or other care provider to review them with you.

## 2017-07-13 NOTE — PROGRESS NOTES
"Re: Jose Eduardo Ryan      MRN# [id number]  YOB: 1995  Date of Visit:7/13/2017     OUTPATIENT NEUROLOGY VISIT NOTE    Reason for Visit:  Headache follow up    Interval History  Jose Eduardo Ryan is a 22-year-old male presents to the clinic today for headache follow up. Last Neurology visit for headache 08/15/2016. Accompanied by   The patient was seen by Dr. Harry in 08/2014 for an initial migraine headache evaluation.  The patient has also been seen at the [facility] in [place], the site of the [facility] in the past.  He was seen by a neurologist in [place], [place].    He has a history of headache since the age of 3.  Strong family history of migraine headache in his mother who lives in [place].  Dr. Harry referred the patient to Dr. Coy for Botox therapy which patient has been receiving since  2014.    He was on amitriptyline, topiramate, valproate, verapamil in the past.   His past abortive therapies were with Relpax, Maxalt and injectable sumatriptan.  Reports that Tuesday was a very severe headache and took 16 hours to \"get rid off.\" Patient did not go to the ED.   Reports that he took Sumatriptan injection -the first injection did not work but the second injection worked. Sumatriptan only injectable works for him.   Reports that he did not take promethazine suppository because he did not refill it and \"did not think about\" taking it. Antiemetics work \"50/50\". Zofran ODT makes him nauseous.   Reports that during his migraine headache flare ups he has severe nausea and anything in oral form causes vomiting.   Headache frequency -3-4 per months and lasting for a several days. Headaches cluster frequently. Headaches are at the base of his skull and bifrontal.   Sleeping is good. No significant stress. No alcohol.   Caffeine \" a lot of caffeine\" 24 oz in am and pop -16 oz of MD  Smoking -1/2 pack per day. Would like to quit.   Missed " work 2 times last and this months.     PLAN discussed. The goal is to avoid any medications in oral forms due to severe nausea and vomiting during the acute migraine episode. Discussed Cefaly. No clinical experience with Cefaly but can be tried. Information was given to the patient to explore.   Plan:  Cefaly to research about.   Sumatriptan injectable as needed. May repeat in 2 hours. May take with promethazine suppository, hydroxyzine and naproxen delayed release as needed and instructed at the acute headache onset  May consider preventive treatment if headaches are more frequent or severe or need for ED treatment or missing too much of your work  Quit smoking  Wean off caffeine  Continue Botox   Follow up in 3 months      Past Medical History reviewed   Social History   Substance Use Topics     Smoking status: Current Every Day Smoker     Packs/day: 0.30     Years: 2.00     Types: Cigarettes     Smokeless tobacco: Current User     Alcohol use 0.0 oz/week     0 Standard drinks or equivalent per week      Comment: rarely    reviewed and verified with the patient   No Known Allergies    Current Outpatient Prescriptions   Medication Sig Dispense Refill     OnabotulinumtoxinA (BOTOX IJ) Inject 135 Units into the muscle Lot # /C3  Exp: 10/2019       methylPREDNISolone (MEDROL) 4 MG tablet Take 1 tablet (4 mg) by mouth See Admin Instructions follow package directions 21 tablet 0     SUMAtriptan (IMITREX STATDOSE) 6 MG/0.5ML pen injector kit Inject 6 mg may repeat after 2 hours if needed; MAX 6 mg/dose and 12 mg/24 hours. Limit use to no more than two days per week. 1 kit 11     OnabotulinumtoxinA (BOTOX IJ) Inject 135 Units into the muscle Lot 3 /C3 Exp: 8/2019       OnabotulinumtoxinA (BOTOX IJ) Inject 135 Units into the muscle once Lot: /C3 Exp: 06/2019       ONABOTULINUMTOXINA IJ Inject 135 Units as directed Lot: [id number]  Exp: Mar 2019       promethazine (PHENERGAN) 25 MG suppository Place 1  "suppository (25 mg) rectally every 6 hours as needed for nausea 30 suppository 6     methylPREDNISolone (MEDROL DOSEPAK) 4 MG tablet Follow package instructions 21 tablet 0     SUMAtriptan (IMITREX) 100 MG tablet Take 1 tablet (100 mg) by mouth at onset of headache for migraine Take 1 tablet (100 mg) by mouth at onset of headache for migraine. May repeat in 2 hours. Max 200 mg in 24 hours. Limit use to 2 days per week. 12 tablet 3     OnabotulinumtoxinA (BOTOX IJ) Inject 135 Units as directed Lot# /C3, Exp 11/2018       OnabotulinumtoxinA (BOTOX IJ) Inject 135 Units as directed Lot# /C3, Exp 04/2018       OnabotulinumtoxinA (BOTOX IJ) Inject 125 Units into the muscle Lot # /C3  Exp: 1/2018       OnabotulinumtoxinA (BOTOX IJ) Inject 135 Units as directed Lot# /C3, Exp 09/2017       naproxen sodium (ANAPROX) 550 MG tablet Take 1 tablet (550 mg) by mouth 2 times daily as needed for moderate pain 30 tablet 0     ondansetron (ZOFRAN-ODT) 4 MG disintegrating tablet Take 1 tablet (4 mg) by mouth every 8 hours as needed for nausea 30 tablet 3     amitriptyline (ELAVIL) 10 MG tablet Take 1 tablet (10 mg) by mouth At Bedtime 30 tablet 1     OnabotulinumtoxinA (BOTOX IJ) Inject 125 Units into the muscle Lot # /C3  Exp: 4/2017       guaiFENesin-codeine (ROBITUSSIN AC) 100-10 MG/5ML SOLN Take 10 mLs by mouth every 4 hours as needed for cough 120 mL 0     OnabotulinumtoxinA (BOTOX IJ) Inject 125 Units as directed Lot# /C3, Exp 01/2017       OnabotulinumtoxinA (BOTOX IJ) Inject 125 Units as directed Lot# /C3, Exp 10/2016     reviewed and verified with the patient    Review of Systems:   A 10-point ROS including constitutional, eyes, respiratory, cardiovascular, gastroenterology, genitourinary, integumentary, musculoskeletal, neurology and psychiatric were all negative except as mentioned in the interval history.      General Exam:   /79  Pulse 67  Ht 1.803 m (5' 11\")  No headache " today  GEN: Awake, NAD; good eye contact, responses appropriately, his girlfriend in the room.   HEENT: Head atraumatic/Normocephalic. Scalp normal. Pupils equally round, 4 mm, reactive to light and accommodation, sclera and conjunctiva normal. Fundoscopic examination reveals normal vessels no papilledema.   The patient is alert and oriented times four. Has good attention and concentration. Speech is fluent without dysarthria. EOM intact. There is no nystagmus. Has conjugated gaze. Face is symmetrical. Intact and symmetrical eyebrow and lid raise and eyelid closure, smiles. Hearing Intact to conversation speech. Strength  5/5 in the upper and lower extremities bilaterally. Sensation is intact to  touch throughout.  Reflexes symmetrical at biceps, triceps, brachioradialis, patellar, and Achilles. Negative Babinski with downgoing toes bilaterally. Coordination reveals finger-nose-finger with normal speed and accuracy. Normal casual gait.  Assessment and Plan:  See Interval History for discussion and plan    Prescription refills sumatriptan, promethazine, naproxen, hydroxyzine provided. Correct use and course provided. Expected benefits and typical side effects reviewed. Safety of concomitant medications and interactions reviewed. Patient taught signs and symptoms of adverse reactions and allergies. Patient understands teaching and accepts risks of prescribed medication regimen.    I discussed all my recommendation with Jose Eduardo Ryan. The patient verbalizes understanding and comfortable with the plan. The patient has our clinic phone number to call with any questions or concerns. All of the patient's questions were answered from the best of my current knowledge.     Time spent with pt answering questions, discussing findings, counseling and coordinating care was more than 50% the appointment time, 30  minutes.     BRANDI Ma, CNP  [facility]    Answers for HPI/ROS submitted by the patient on  7/13/2017   General Symptoms: No  Skin Symptoms: No  HENT Symptoms: No  EYE SYMPTOMS: No  HEART SYMPTOMS: No  LUNG SYMPTOMS: No  INTESTINAL SYMPTOMS: No  URINARY SYMPTOMS: No  REPRODUCTIVE SYMPTOMS: No  SKELETAL SYMPTOMS: Yes  BLOOD SYMPTOMS: No  NERVOUS SYSTEM SYMPTOMS: Yes  MENTAL HEALTH SYMPTOMS: No  Back pain: No  Muscle aches: Yes  Neck pain: Yes  Swollen joints: No  Joint pain: No  Bone pain: No  Muscle cramps: No  Muscle weakness: No  Joint stiffness: No  Bone fracture: No  Trouble with coordination: No  Dizziness or trouble with balance: Yes  Fainting or black-out spells: No  Memory loss: No  Headache: Yes  Seizures: No  Speech problems: No  Tingling: No  Tremor: No  Weakness: No  Difficulty walking: No  Paralysis: No  Numbness: No

## 2017-07-13 NOTE — LETTER
"7/13/2017       RE: Jose Eduardo Ryan  [address] [place] [address]     Dear Colleague,    Thank you for referring your patient, Jose Eduardo Ryan, to the [facility]ATION at [facility]. Please see a copy of my visit note below.    BOTULINUM TOXIN PROCEDURE - HEADACHE - NOTE  Chief Complaint   Patient presents with     RECHECK     Chronic migraines       /79  Pulse 67  Ht 1.803 m (5' 11\")       Current Outpatient Prescriptions:      OnabotulinumtoxinA (BOTOX IJ), Inject 135 Units into the muscle Lot # /C3  Exp: 10/2019, Disp: , Rfl:      OnabotulinumtoxinA (BOTOX IJ), Inject 135 Units into the muscle Lot 3 /C3 Exp: 8/2019, Disp: , Rfl:      OnabotulinumtoxinA (BOTOX IJ), Inject 135 Units into the muscle once Lot: /C3 Exp: 06/2019, Disp: , Rfl:      ONABOTULINUMTOXINA IJ, Inject 135 Units as directed Lot: [id number] Exp: Mar 2019, Disp: , Rfl:      SUMAtriptan (IMITREX) 100 MG tablet, Take 1 tablet (100 mg) by mouth at onset of headache for migraine Take 1 tablet (100 mg) by mouth at onset of headache for migraine. May repeat in 2 hours. Max 200 mg in 24 hours. Limit use to 2 days per week., Disp: 12 tablet, Rfl: 3     OnabotulinumtoxinA (BOTOX IJ), Inject 135 Units as directed Lot# /C3, Exp 11/2018, Disp: , Rfl:      OnabotulinumtoxinA (BOTOX IJ), Inject 135 Units as directed Lot# /C3, Exp 04/2018, Disp: , Rfl:      OnabotulinumtoxinA (BOTOX IJ), Inject 125 Units into the muscle Lot # /C3  Exp: 1/2018, Disp: , Rfl:      OnabotulinumtoxinA (BOTOX IJ), Inject 135 Units as directed Lot# /C3, Exp 09/2017, Disp: , Rfl:      amitriptyline (ELAVIL) 10 MG tablet, Take 1 tablet (10 mg) by mouth At Bedtime, Disp: 30 tablet, Rfl: 1     OnabotulinumtoxinA (BOTOX IJ), Inject 125 Units into the muscle Lot # /C3  Exp: 4/2017, Disp: , Rfl:      guaiFENesin-codeine (ROBITUSSIN AC) 100-10 MG/5ML SOLN, Take 10 mLs by mouth " every 4 hours as needed for cough, Disp: 120 mL, Rfl: 0     OnabotulinumtoxinA (BOTOX IJ), Inject 125 Units as directed Lot# /C3, Exp 01/2017, Disp: , Rfl:      OnabotulinumtoxinA (BOTOX IJ), Inject 125 Units as directed Lot# /C3, Exp 10/2016, Disp: , Rfl:      hydrOXYzine (VISTARIL) 25 MG capsule, Take 1 capsule (25 mg) by mouth every 6 hours as needed (headache), Disp: 20 capsule, Rfl: 1     SUMAtriptan (IMITREX STATDOSE) 6 MG/0.5ML pen injector kit, Inject 6 mg may repeat after 2 hours if needed; MAX 6 mg/dose and 12 mg/24 hours. Limit use to no more than two days per week., Disp: 1 kit, Rfl: 11     SUMAtriptan Succinate Refill (IMITREX) 6 MG/0.5ML SOCT, Inject 0.5 mLs (6 mg) Subcutaneous at onset of headache for migraine May repeat in 2 hours as needed. Max 12 mg/1ml in 24 hours, Disp: 1 mL, Rfl: 11     naproxen (NAPROXEN) 500 MG TBEC, Take 500 mg by mouth every 12 hours as needed, Disp: 60 tablet, Rfl: 3     promethazine (PHENERGAN) 25 MG Suppository, Place 1 suppository (25 mg) rectally every 6 hours as needed for nausea, Disp: 30 suppository, Rfl: 6     No Known Allergies     PHYSICAL EXAM:  Pt denies headache today.  Last migraine headache was Tuesday of this week (2 days ago) rated 10/10 took 2 shots of Imitrex with relief. Patient states that the summer season is the worst time for him for his headaches, since the heat makes them worse.    HPI:  Patient denies new medical diagnoses, illnesses, hospitalizations, emergency room visits, and injuries since the previous injection with botulinum neurotoxin.    We reviewed the recommended safety guidelines for  Botox from any vaccine injection, such as the seasonal flu vaccine, by a minimum of 10-14 days with Jose Eduardo Ryan. He acknowledged understanding.    RESPONSE TO PREVIOUS TREATMENT:  Change in headache pattern following last series of injections with 135 units of  Botox on 04/27/2017.  Pt reports regularly he has good relief up until  approx 3-4 weeks before next Botox injections where, for about 2 weeks, suddenly headache frequency and intensity increase severely, as last set of injections noted.    Post-procedural headache: Rated as 'Mild' severity.  Duration: few  days resolved   Injection site pain rates mild lasts a few days then resolves.     1.  Headache frequency during this injection cycle:  3-4 headache days per month.  This is compared to his baseline headache frequency of daily headache days per month.     2.  Headache duration during this injection cycle:  Headache duration ranged from 1 hours to 15 hours.      3.  Headache intensity during this injection cycle:    A.  5/10  =  Typical pain level.  B.  10/10  =  Worst pain level.  C.  0/10  =  Lowest pain level.    4.  Change in headache medication usage during this injection cycle:  (For Example:  Able to decrease use of oral pain medications.) No change in use of pain medication    5.  ER Visits During This Injection Cycle:  0    6.  Functional Performance:  Change in ADL's, social interaction, days lost from work, etc. Patient reports 4 or 5 days lost from work due to headache during this injection cycle  Had to leave early from work one day.    BOTULINUM NEUROTOXIN INJECTION PROCEDURES:    VERIFICATION OF PATIENT IDENTIFICATION AND PROCEDURE     Initials   Patient Name pag   Patient  pag   Procedure Verified by: pag     Prior to the start of the procedure and with procedural staff participation, I verbally confirmed the patient s identity using two indicators, relevant allergies, that the procedure was appropriate and matched the consent or emergent situation, and that the correct equipment/implants were available. Immediately prior to starting the procedure I conducted the Time Out with the procedural staff and re-confirmed the patient s name, procedure, and site/side. (The Joint Commission universal protocol was followed.)  Yes    Sedation (Moderate or Deep): None  Above  assessments performed by:  JORGE Sandhu Care Coordinator    Errol Coy MD      INDICATIONS FOR PROCEDURES:  Jose Eduardo Ryan is a 22 year old male patient with chronic migraine headaches associated with cervicogenic components.  Jose Eduardo reports overall improvement in his migraine headache pattern, with fewer, less intense headaches since starting Botox.     His baseline symptoms have been recalcitrant to oral medications and conservative therapy. He is here today for reinjection with Botox.     GOAL OF PROCEDURE:  The goal of this procedure is to decrease pain .    TOTAL DOSE ADMINISTERED:  Dose Administered:  150 units  Botox (Botulinum Toxin Type A)       2:1 Dilution   Diluent Used:  0.5% Sensorcaine -  (NDC:0039-1238-22) (Lot # [id number], Exp: 10/1/2017)  Total Volume of Diluent Used:  3.0 ml  Lot # /C3 with Expiration Date:  02/2020  NDC #: Botox 100u ([address]-29)    Medication guide was offered to patient and was declined.    CONSENT:  The risks, benefits, and treatment options were discussed with Jose Eduardo Ryan and he agreed to proceed.    Written consent was obtained by PAG.     EQUIPMENT USED:  Needle-37mm stimulating/recording  Needle-30 gauge  EMG/NCS Machine     SKIN PREPARATION:  Skin preparation was performed using an alcohol wipe.     GUIDANCE DESCRIPTION:  Electro-myographic guidance was necessary throughout the cervical portion of the procedure to accurately identify all areas of spastic muscles while avoiding injection of non-spastic muscles, neighboring nerves and nearby vascular structures.     AREA/MUSCLE INJECTED:  135 Units  1 & 2. SHOULDER GIRDLE & NECK MUSCLES: 25 units Botox = Total Dose, 2:1 Dilution   Right Middle Trapezius (mid cervical) - 2.5 units of Botox at 1 site/s.   Left Middle Trapezius (mid cervical) - 2.5 units of Botox at 1 site/s.      Right Splenius - 5 units of Botox at 1 site/s.   Left Splenius - 5 units of Botox at 1 site/s.      Right Levator Scapulae - 5  units of Botox at 1 site/s (shoulder muscles).   Left Levator Scapulae - 5 units of Botox at 1 site/s (shoulder muscles).     3. HEAD & SCALP MUSCLES: 125 units Botox = Total Dose, 2:1 Dilution  Right Frontalis - 30 units of Botox at 4 site/s.  Left Frontalis - 25 units of Botox at 4 site/s.     Right Temporalis - 25 units of Botox at 6 site/s.  Left Temporalis - 25 units of Botox at 6 site/s.     Right  - 5 units of Botox at 1 site/s.              Left  - 5 units of Botox at 1 site/s.     Procerus - 10 units of Botox at 1 site/s.    RESPONSE TO PROCEDURE:  Jose Eduardo Ryan tolerated the procedure well and there were no immediate complications.   He was allowed to recover for an appropriate period of time and was discharged home in stable condition.    FOLLOW UP:  Jose Eduardo Ryan was asked to follow up by phone in 7-14 days with Sharon Ding PT, Care Coordinator or Iwona Chris RN, Care Coordinator, to report his response to this series of injections.  Based on the patient's previous response to this therapy, Jose Eduardo Ryan was rescheduled for the next series of injections in 12 weeks.    PLAN (Medication Changes, Therapy Orders, Work or Disability Issues, etc.): Patient will continue to monitor response to today's injections.   Given the fact that severe headaches recur consistently at 10 weeks after Botox injections, which usually requires ER visit, we will request Pt's insurance company to decrease visit interval from 12 weeks to 10 weeks.       Again, thank you for allowing me to participate in the care of your patient.      Sincerely,    Errol oCy MD

## 2017-07-13 NOTE — PATIENT INSTRUCTIONS
Plan:  Cefaly to research about.   Sumatriptan injectable as needed. May repeat in 2 hours. May take with promethazine suppository, hydroxyzine and naproxen delayed release as needed and instructed at the acute headache onset  May consider preventive treatment if headaches are more frequent or severe or need for ED treatment or or need for ED treatment or missing too much of your work  Quit smoking  Wean off caffeine  Continue Botox   Follow up in 3 months          Patient Education    Hydroxyzine Hydrochloride Oral solution    Hydroxyzine Hydrochloride Oral tablet    Hydroxyzine Hydrochloride Solution for injection    Hydroxyzine Pamoate Oral capsule    Hydroxyzine Pamoate Oral suspension  Hydroxyzine Hydrochloride Oral solution  What is this medicine?  HYDROXYZINE (nelly DROX i zeen) is an antihistamine. This medicine is used to treat allergy symptoms. It is also used to treat anxiety and tension. This medicine can be used with other medicines to induce sleep before surgery.  This medicine may be used for other purposes; ask your health care provider or pharmacist if you have questions.  What should I tell my health care provider before I take this medicine?  They need to know if you have any of these conditions:    any chronic illness    diabetes    difficulty passing urine    glaucoma    heart disease    kidney disease    liver disease    lung disease    an unusual or allergic reaction to hydroxyzine, cetirizine, other medicines, foods, dyes, or preservatives    pregnant or trying to get pregnant    breast-feeding  How should I use this medicine?  Take this medicine by mouth with a full glass of water. Follow the directions on the prescription label. Use a specially marked spoon or dropper to measure every dose. Ask your pharmacist if you do not have one. Household spoons are not accurate. You may take this medicine with food or on an empty stomach. Take your medicine at regular intervals. Do not take your  medicine more often than directed.  Talk to your pediatrician regarding the use of this medicine in children. Special care may be needed. While this drug may be prescribed for children as young as 2 years of age for selected conditions, precautions do apply.  Patients over 65 years old may have a stronger reaction and need a smaller dose.  Overdosage: If you think you have taken too much of this medicine contact a poison control center or emergency room at once.  NOTE: This medicine is only for you. Do not share this medicine with others.  What if I miss a dose?  If you miss a dose, take it as soon as you can. If it is almost time for your next dose, take only that dose. Do not take double or extra doses.  What may interact with this medicine?    alcohol    barbiturate medicines for sleep or seizures    medicines for colds, allergies    medicines for depression, anxiety, or emotional disturbances    medicines for pain    medicines for sleep    muscle relaxants  This list may not describe all possible interactions. Give your health care provider a list of all the medicines, herbs, non-prescription drugs, or dietary supplements you use. Also tell them if you smoke, drink alcohol, or use illegal drugs. Some items may interact with your medicine.  What should I watch for while using this medicine?  Tell your doctor or health care professional if your symptoms do not improve.  You may get drowsy or dizzy. Do not drive, use machinery, or do anything that needs mental alertness until you know how this medicine affects you. Do not stand or sit up quickly, especially if you are an older patient. This reduces the risk of dizzy or fainting spells. Alcohol may interfere with the effect of this medicine. Avoid alcoholic drinks.  Your mouth may get dry. Chewing sugarless gum or sucking hard candy, and drinking plenty of water may help. Contact your doctor if the problem does not go away or is severe.  This medicine may cause dry  eyes and blurred vision. If you wear contact lenses you may feel some discomfort. Lubricating drops may help. See your eye doctor if the problem does not go away or is severe.  If you are receiving skin tests for allergies, tell your doctor you are using this medicine.  What side effects may I notice from receiving this medicine?  Side effects that you should report to your doctor or health care professional as soon as possible:    difficulty passing urine    fast or irregular heartbeat    seizures    slurred speech or confusion    tremor  Side effects that usually do not require medical attention (report to your doctor or health care professional if they continue or are bothersome):    constipation    drowsiness    fatigue    headache    stomach upset  This list may not describe all possible side effects. Call your doctor for medical advice about side effects. You may report side effects to FDA at 2-092-FDA-9901.  Where should I keep my medicine?  Keep out of the reach of children.  Store at room temperature between 15 and 30 degrees C (59 and 86 degrees F). Do not freeze. Protect from light. Throw away any unused medicine after the expiration date.  NOTE:This sheet is a summary. It may not cover all possible information. If you have questions about this medicine, talk to your doctor, pharmacist, or health care provider. Copyright  2016 Gold Standard

## 2017-07-13 NOTE — MR AVS SNAPSHOT
After Visit Summary   7/13/2017    Jose Eduardo Ryan    MRN: [id number]           Patient Information     Date Of Birth          1995        Visit Information        Provider Department      7/13/2017 1:10 PM Errol Coy MD [facility]        Today's Diagnoses     Intractable chronic migraine without aura and without status migrainosus    -  1       Follow-ups after your visit        Follow-up notes from your care team     Return in about 12 weeks (around 10/5/2017) for Headache.      Your next 10 appointments already scheduled     Oct 05, 2017  1:10 PM CDT   (Arrive by 12:55 PM)   Return Botox with Errol Coy MD   [facility] ([facility])    [address] [place] [id number] [phone]            Oct 05, 2017  2:00 PM CDT   (Arrive by 1:45 PM)   Return Visit with BRANDI Partida [facility] ([facility])    [address] [place] [id number] [phone]            Dec 28, 2017  1:50 PM CST   (Arrive by 1:35 PM)   Return Botox with Errol Coy MD   [facility] ([facility])    [address] [place] [id number] [phone]              Who to contact     Please call your clinic at [phone] to:    Ask questions about your health    Make or cancel appointments    Discuss your medicines    Learn about your test results    Speak to your doctor   If you have compliments or concerns about an experience at your clinic, or if you wish to file a complaint, please contact [facility] Patient Relations at [phone] or email us at [email or web address]         Additional Information About Your Visit        MyChart Information     MyChart is an electronic  "gateway that provides easy, online access to your medical records. With [email or web address], you can request a clinic appointment, read your test results, renew a prescription or communicate with your care team.     To sign up for [email or web address] visit the website at [email or web address]   You will be asked to enter the access code listed below, as well as some personal information. Please follow the directions to create your username and password.     Your access code is: [email or web address]  Expires: 2017  1:03 PM     Your access code will  in 90 days. If you need help or a new code, please contact your [facility] or call [phone] for assistance.        Care EveryWhere ID     This is your Care EveryWhere ID. This could be used by other organizations to access your [place] medical records  [id number]        Your Vitals Were     Pulse Height                67 1.803 m (5' 11\")           Blood Pressure from Last 3 Encounters:   17 122/79   17 122/79   17 143/86    Weight from Last 3 Encounters:   17 87.5 kg (193 lb)   17 86.2 kg (190 lb)   16 84.3 kg (185 lb 14.4 oz)              We Performed the Following     HC CHEMODENERVATE FACIAL,TRIGERM,NERV MIGRAINE     Needle EMG Guide w/Chemodenervation (96276)          Today's Medication Changes          These changes are accurate as of: 17  4:30 PM.  If you have any questions, ask your nurse or doctor.               Start taking these medicines.        Dose/Directions    hydrOXYzine 25 MG capsule   Commonly known as:  VISTARIL   Used for:  Migraine with aura and with status migrainosus, not intractable   Started by:  Tanna Menendez APRN CNP        Dose:  25 mg   Take 1 capsule (25 mg) by mouth every 6 hours as needed (headache)   Quantity:  20 capsule   Refills:  1       naproxen 500 MG Tbec   Commonly known as:  naproxen   Used for:  Migraine with aura and with status migrainosus, not " intractable   Started by:  Tanna Menendez APRN CNP        Dose:  500 mg   Take 500 mg by mouth every 12 hours as needed   Quantity:  60 tablet   Refills:  3         These medicines have changed or have updated prescriptions.        Dose/Directions    * SUMAtriptan 100 MG tablet   Commonly known as:  IMITREX   This may have changed:  Another medication with the same name was added. Make sure you understand how and when to take each.   Used for:  Migraine with intractable migraine, so stated, with status migrainosus   Changed by:  Baron Gibbs, RN        Dose:  100 mg   Take 1 tablet (100 mg) by mouth at onset of headache for migraine Take 1 tablet (100 mg) by mouth at onset of headache for migraine. May repeat in 2 hours. Max 200 mg in 24 hours. Limit use to 2 days per week.   Quantity:  12 tablet   Refills:  3       * SUMAtriptan 6 MG/0.5ML pen injector kit   Commonly known as:  IMITREX STATDOSE   This may have changed:  Another medication with the same name was added. Make sure you understand how and when to take each.   Used for:  Intractable migraine with aura with status migrainosus   Changed by:  Tanna Menendez APRN CNP        Inject 6 mg may repeat after 2 hours if needed; MAX 6 mg/dose and 12 mg/24 hours. Limit use to no more than two days per week.   Quantity:  1 kit   Refills:  11       * SUMAtriptan Succinate Refill 6 MG/0.5ML Soct   Commonly known as:  IMITREX   This may have changed:  You were already taking a medication with the same name, and this prescription was added. Make sure you understand how and when to take each.   Used for:  Migraine with aura and with status migrainosus, not intractable   Changed by:  Tanna Menendez APRN CNP        Dose:  6 mg   Inject 0.5 mLs (6 mg) Subcutaneous at onset of headache for migraine May repeat in 2 hours as needed. Max 12 mg/1ml in 24 hours   Quantity:  1 mL   Refills:  11       * Notice:  This list has 3  medication(s) that are the same as other medications prescribed for you. Read the directions carefully, and ask your doctor or other care provider to review them with you.      Stop taking these medicines if you haven't already. Please contact your care team if you have questions.     naproxen sodium 550 MG tablet   Commonly known as:  ANAPROX   Stopped by:  Tanna Menendez APRN CNP                Where to get your medicines      These medications were sent to [facility] [address] [facility] [address] [address] [facility] [address]     Phone:  [phone]     hydrOXYzine 25 MG capsule    naproxen 500 MG Tbec    promethazine 25 MG Suppository    SUMAtriptan 6 MG/0.5ML pen injector kit    SUMAtriptan Succinate Refill 6 MG/0.5ML Soct                Primary Care Provider    [facility]       No address on file        Equal Access to Services     KIP CLEMENTE AH: Hadii aad ku hadasho Soomaali, waaxda luqadaha, qaybta kaalmada adeegyada, waxay idiin hayaan rosy kharadc hayden . So [facility] [phone].    ATENCIÓN: Si lisha perdue, tiene a lovett disposición servicios gratuitos de asistencia lingüística. Llame al [phone].    We comply with applicable federal civil rights laws and [place] laws. We do not discriminate on the basis of race, color, national origin, age, disability sex, sexual orientation or gender identity.            Thank you!     Thank you for choosing [facility]  for your care. Our goal is always to provide you with excellent care. Hearing back from our patients is one way we can continue to improve our services. Please take a few minutes to complete the written survey that you may receive in the mail after your visit with us. Thank you!             Your Updated Medication List - Protect others around you: Learn how to safely use, store and throw away your medicines at  www.disposemymeds.org.          This list is accurate as of: 7/13/17  4:30 PM.  Always use your most recent med list.                   Brand Name Dispense Instructions for use Diagnosis    amitriptyline 10 MG tablet    ELAVIL    30 tablet    Take 1 tablet (10 mg) by mouth At Bedtime    Intractable migraine with status migrainosus       * BOTOX IJ      Inject 125 Units as directed Lot# /C3, Exp 10/2016        * BOTOX IJ      Inject 125 Units as directed Lot# /C3, Exp 01/2017        * BOTOX IJ      Inject 125 Units into the muscle Lot # /C3  Exp: 4/2017        * BOTOX IJ      Inject 135 Units as directed Lot# /C3, Exp 09/2017        * BOTOX IJ      Inject 125 Units into the muscle Lot # /C3  Exp: 1/2018        * BOTOX IJ      Inject 135 Units as directed Lot# /C3, Exp 04/2018        * BOTOX IJ      Inject 135 Units as directed Lot# /C3, Exp 11/2018        * ONABOTULINUMTOXINA IJ      Inject 135 Units as directed Lot: [id number] Exp: Mar 2019        * BOTOX IJ      Inject 135 Units into the muscle once Lot: /C3 Exp: 06/2019        * BOTOX IJ      Inject 135 Units into the muscle Lot 3 /C3 Exp: 8/2019        * BOTOX IJ      Inject 135 Units into the muscle Lot # /C3  Exp: 10/2019        * BOTOX IJ      Inject 150 Units into the muscle Lot # /C3  Exp: 1/2020        guaiFENesin-codeine 100-10 MG/5ML Soln solution    ROBITUSSIN AC    120 mL    Take 10 mLs by mouth every 4 hours as needed for cough    Cough       hydrOXYzine 25 MG capsule    VISTARIL    20 capsule    Take 1 capsule (25 mg) by mouth every 6 hours as needed (headache)    Migraine with aura and with status migrainosus, not intractable       naproxen 500 MG Tbec    naproxen    60 tablet    Take 500 mg by mouth every 12 hours as needed    Migraine with aura and with status migrainosus, not intractable       promethazine 25 MG Suppository    PHENERGAN    30 suppository    Place 1 suppository (25 mg) rectally  every 6 hours as needed for nausea    Intractable migraine with aura with status migrainosus       * SUMAtriptan 100 MG tablet    IMITREX    12 tablet    Take 1 tablet (100 mg) by mouth at onset of headache for migraine Take 1 tablet (100 mg) by mouth at onset of headache for migraine. May repeat in 2 hours. Max 200 mg in 24 hours. Limit use to 2 days per week.    Migraine with intractable migraine, so stated, with status migrainosus       * SUMAtriptan 6 MG/0.5ML pen injector kit    IMITREX STATDOSE    1 kit    Inject 6 mg may repeat after 2 hours if needed; MAX 6 mg/dose and 12 mg/24 hours. Limit use to no more than two days per week.    Intractable migraine with aura with status migrainosus       * SUMAtriptan Succinate Refill 6 MG/0.5ML Soct    IMITREX    1 mL    Inject 0.5 mLs (6 mg) Subcutaneous at onset of headache for migraine May repeat in 2 hours as needed. Max 12 mg/1ml in 24 hours    Migraine with aura and with status migrainosus, not intractable       * Notice:  This list has 15 medication(s) that are the same as other medications prescribed for you. Read the directions carefully, and ask your doctor or other care provider to review them with you.

## 2017-07-13 NOTE — PROGRESS NOTES
"BOTULINUM TOXIN PROCEDURE - HEADACHE - NOTE    Chief Complaint   Patient presents with     RECHECK     Chronic migraines       /79  Pulse 67  Ht 1.803 m (5' 11\")       Current Outpatient Prescriptions:      OnabotulinumtoxinA (BOTOX IJ), Inject 135 Units into the muscle Lot # /C3  Exp: 10/2019, Disp: , Rfl:      OnabotulinumtoxinA (BOTOX IJ), Inject 135 Units into the muscle Lot 3 /C3 Exp: 8/2019, Disp: , Rfl:      OnabotulinumtoxinA (BOTOX IJ), Inject 135 Units into the muscle once Lot: /C3 Exp: 06/2019, Disp: , Rfl:      ONABOTULINUMTOXINA IJ, Inject 135 Units as directed Lot: [id number] Exp: Mar 2019, Disp: , Rfl:      SUMAtriptan (IMITREX) 100 MG tablet, Take 1 tablet (100 mg) by mouth at onset of headache for migraine Take 1 tablet (100 mg) by mouth at onset of headache for migraine. May repeat in 2 hours. Max 200 mg in 24 hours. Limit use to 2 days per week., Disp: 12 tablet, Rfl: 3     OnabotulinumtoxinA (BOTOX IJ), Inject 135 Units as directed Lot# /C3, Exp 11/2018, Disp: , Rfl:      OnabotulinumtoxinA (BOTOX IJ), Inject 135 Units as directed Lot# /C3, Exp 04/2018, Disp: , Rfl:      OnabotulinumtoxinA (BOTOX IJ), Inject 125 Units into the muscle Lot # /C3  Exp: 1/2018, Disp: , Rfl:      OnabotulinumtoxinA (BOTOX IJ), Inject 135 Units as directed Lot# /C3, Exp 09/2017, Disp: , Rfl:      amitriptyline (ELAVIL) 10 MG tablet, Take 1 tablet (10 mg) by mouth At Bedtime, Disp: 30 tablet, Rfl: 1     OnabotulinumtoxinA (BOTOX IJ), Inject 125 Units into the muscle Lot # /C3  Exp: 4/2017, Disp: , Rfl:      guaiFENesin-codeine (ROBITUSSIN AC) 100-10 MG/5ML SOLN, Take 10 mLs by mouth every 4 hours as needed for cough, Disp: 120 mL, Rfl: 0     OnabotulinumtoxinA (BOTOX IJ), Inject 125 Units as directed Lot# /C3, Exp 01/2017, Disp: , Rfl:      OnabotulinumtoxinA (BOTOX IJ), Inject 125 Units as directed Lot# /C3, Exp 10/2016, Disp: , Rfl:      hydrOXYzine (VISTARIL) 25 " MG capsule, Take 1 capsule (25 mg) by mouth every 6 hours as needed (headache), Disp: 20 capsule, Rfl: 1     SUMAtriptan (IMITREX STATDOSE) 6 MG/0.5ML pen injector kit, Inject 6 mg may repeat after 2 hours if needed; MAX 6 mg/dose and 12 mg/24 hours. Limit use to no more than two days per week., Disp: 1 kit, Rfl: 11     SUMAtriptan Succinate Refill (IMITREX) 6 MG/0.5ML SOCT, Inject 0.5 mLs (6 mg) Subcutaneous at onset of headache for migraine May repeat in 2 hours as needed. Max 12 mg/1ml in 24 hours, Disp: 1 mL, Rfl: 11     naproxen (NAPROXEN) 500 MG TBEC, Take 500 mg by mouth every 12 hours as needed, Disp: 60 tablet, Rfl: 3     promethazine (PHENERGAN) 25 MG Suppository, Place 1 suppository (25 mg) rectally every 6 hours as needed for nausea, Disp: 30 suppository, Rfl: 6     No Known Allergies     PHYSICAL EXAM:    Pt denies headache today.  Last migraine headache was Tuesday of this week (2 days ago) rated 10/10 took 2 shots of Imitrex with relief. Patient states that the summer season is the worst time for him for his headaches, since the heat makes them worse.    HPI:    Patient denies new medical diagnoses, illnesses, hospitalizations, emergency room visits, and injuries since the previous injection with botulinum neurotoxin.    We reviewed the recommended safety guidelines for  Botox from any vaccine injection, such as the seasonal flu vaccine, by a minimum of 10-14 days with Jose Eduardo Ryan. He acknowledged understanding.    RESPONSE TO PREVIOUS TREATMENT:  Change in headache pattern following last series of injections with 135 units of  Botox on 04/27/2017.  Pt reports regularly he has good relief up until approx 3-4 weeks before next Botox injections where, for about 2 weeks, suddenly headache frequency and intensity increase severely, as last set of injections noted.    Post-procedural headache: Rated as 'Mild' severity.  Duration: few  days resolved   Injection site pain rates mild lasts a few  days then resolves.     1.  Headache frequency during this injection cycle:  3-4 headache days per month.  This is compared to his baseline headache frequency of daily headache days per month.     2.  Headache duration during this injection cycle:  Headache duration ranged from 1 hours to 15 hours.      3.  Headache intensity during this injection cycle:    A.  5/10  =  Typical pain level.  B.  10/10  =  Worst pain level.  C.  0/10  =  Lowest pain level.    4.  Change in headache medication usage during this injection cycle:  (For Example:  Able to decrease use of oral pain medications.) No change in use of pain medication    5.  ER Visits During This Injection Cycle:  0    6.  Functional Performance:  Change in ADL's, social interaction, days lost from work, etc. Patient reports 4 or 5 days lost from work due to headache during this injection cycle  Had to leave early from work one day.    BOTULINUM NEUROTOXIN INJECTION PROCEDURES:      VERIFICATION OF PATIENT IDENTIFICATION AND PROCEDURE     Initials   Patient Name pag   Patient  pag   Procedure Verified by: pag     Prior to the start of the procedure and with procedural staff participation, I verbally confirmed the patient s identity using two indicators, relevant allergies, that the procedure was appropriate and matched the consent or emergent situation, and that the correct equipment/implants were available. Immediately prior to starting the procedure I conducted the Time Out with the procedural staff and re-confirmed the patient s name, procedure, and site/side. (The Joint Commission universal protocol was followed.)  Yes    Sedation (Moderate or Deep): None    Above assessments performed by:  JORGE Sandhu Care Coordinator    Errol Coy MD      INDICATIONS FOR PROCEDURES:  Jose Eduardo Ryan is a 22 year old male patient with chronic migraine headaches associated with cervicogenic components.  Jose Eduardo reports overall improvement in his migraine  headache pattern, with fewer, less intense headaches since starting Botox.     His baseline symptoms have been recalcitrant to oral medications and conservative therapy. He is here today for reinjection with Botox.     GOAL OF PROCEDURE:  The goal of this procedure is to decrease pain .    TOTAL DOSE ADMINISTERED:  Dose Administered:  150 units  Botox (Botulinum Toxin Type A)       2:1 Dilution   Diluent Used:  0.5% Sensorcaine -  (NDC:7616-9665-73) (Lot # [id number], Exp: 10/1/2017)  Total Volume of Diluent Used:  3.0 ml  Lot # /C3 with Expiration Date:  02/2020  NDC #: Botox 100u ([address]-92)    Medication guide was offered to patient and was declined.    CONSENT:  The risks, benefits, and treatment options were discussed with Jose Eduardo Ryan and he agreed to proceed.    Written consent was obtained by [facility].     EQUIPMENT USED:  Needle-37mm stimulating/recording  Needle-30 gauge  EMG/NCS Machine     SKIN PREPARATION:  Skin preparation was performed using an alcohol wipe.     GUIDANCE DESCRIPTION:  Electro-myographic guidance was necessary throughout the cervical portion of the procedure to accurately identify all areas of spastic muscles while avoiding injection of non-spastic muscles, neighboring nerves and nearby vascular structures.     AREA/MUSCLE INJECTED:  135 Units  1 & 2. SHOULDER GIRDLE & NECK MUSCLES: 25 units Botox = Total Dose, 2:1 Dilution   Right Middle Trapezius (mid cervical) - 2.5 units of Botox at 1 site/s.   Left Middle Trapezius (mid cervical) - 2.5 units of Botox at 1 site/s.      Right Splenius - 5 units of Botox at 1 site/s.   Left Splenius - 5 units of Botox at 1 site/s.      Right Levator Scapulae - 5 units of Botox at 1 site/s (shoulder muscles).   Left Levator Scapulae - 5 units of Botox at 1 site/s (shoulder muscles).     3. HEAD & SCALP MUSCLES: 125 units Botox = Total Dose, 2:1 Dilution  Right Frontalis - 30 units of Botox at 4 site/s.  Left Frontalis - 25 units of Botox at 4  site/s.     Right Temporalis - 25 units of Botox at 6 site/s.  Left Temporalis - 25 units of Botox at 6 site/s.     Right  - 5 units of Botox at 1 site/s.              Left  - 5 units of Botox at 1 site/s.     Procerus - 10 units of Botox at 1 site/s.    RESPONSE TO PROCEDURE:  Jose Eduardo Ryan tolerated the procedure well and there were no immediate complications.   He was allowed to recover for an appropriate period of time and was discharged home in stable condition.    FOLLOW UP:  Jose Eduardo Ryan was asked to follow up by phone in 7-14 days with Sharon Ding PT, Care Coordinator or Iwona Chris RN, Care Coordinator, to report his response to this series of injections.  Based on the patient's previous response to this therapy, Jose Eduardo Ryan was rescheduled for the next series of injections in 12 weeks.    PLAN (Medication Changes, Therapy Orders, Work or Disability Issues, etc.): Patient will continue to monitor response to today's injections.   Given the fact that severe headaches recur consistently at 10 weeks after Botox injections, which usually requires ER visit, we will request Pt's insurance company to decrease visit interval from 12 weeks to 10 weeks.

## 2017-10-05 ENCOUNTER — OFFICE VISIT (OUTPATIENT)
Dept: NEUROLOGY | Facility: CLINIC | Age: 22
End: 2017-10-05

## 2017-10-05 ENCOUNTER — OFFICE VISIT (OUTPATIENT)
Dept: PHYSICAL MEDICINE AND REHAB | Facility: CLINIC | Age: 22
End: 2017-10-05

## 2017-10-05 VITALS
OXYGEN SATURATION: 98 % | RESPIRATION RATE: 20 BRPM | HEART RATE: 87 BPM | SYSTOLIC BLOOD PRESSURE: 137 MMHG | BODY MASS INDEX: 28.56 KG/M2 | WEIGHT: 204 LBS | DIASTOLIC BLOOD PRESSURE: 80 MMHG | TEMPERATURE: 98.6 F | HEIGHT: 71 IN

## 2017-10-05 VITALS
DIASTOLIC BLOOD PRESSURE: 80 MMHG | HEART RATE: 87 BPM | BODY MASS INDEX: 28.56 KG/M2 | SYSTOLIC BLOOD PRESSURE: 137 MMHG | WEIGHT: 204 LBS | HEIGHT: 71 IN | TEMPERATURE: 98.6 F

## 2017-10-05 DIAGNOSIS — G43.719 INTRACTABLE CHRONIC MIGRAINE WITHOUT AURA AND WITHOUT STATUS MIGRAINOSUS: Primary | ICD-10-CM

## 2017-10-05 DIAGNOSIS — G43.101 MIGRAINE WITH AURA AND WITH STATUS MIGRAINOSUS, NOT INTRACTABLE: Primary | ICD-10-CM

## 2017-10-05 ASSESSMENT — PAIN SCALES - GENERAL
PAINLEVEL: NO PAIN (0)
PAINLEVEL: NO PAIN (0)

## 2017-10-05 NOTE — MR AVS SNAPSHOT
After Visit Summary   10/5/2017    Jose Eduardo Ryan    MRN: [id number]           Patient Information     Date Of Birth          1995        Visit Information        Provider Department      10/5/2017 2:00 PM Tanna Menendez APRN CNP [facility]        Care Instructions    Plan:  No new changes in acute headache treatment plan -sumatriptan injectable+naproxen 1-2 tablets as needed every 12 hours +promethazine suppositories.   Keep your acute headache medications close to you all the time.   Rest, hydration and good sleep.   Follow up in 6 months or sooner if needed            Follow-ups after your visit        Your next 10 appointments already scheduled     Dec 28, 2017  1:50 PM CST   (Arrive by 1:35 PM)   Return Botox with Errol Coy MD   [facility] ([facility])    [address] [place] [id number] [phone]            Mar 22, 2018  1:10 PM CDT   (Arrive by 12:55 PM)   Return Botox with Errol Coy MD   [facility] ([facility])    [address] [place] [id number] [phone]            Mar 22, 2018  2:00 PM CDT   (Arrive by 1:45 PM)   Return Visit with BRANDI Partida CNP   [facility] ([facility])    [address] [place] [id number] [phone]              Who to contact     Please call your clinic at [phone] to:    Ask questions about your health    Make or cancel appointments    Discuss your medicines    Learn about your test results    Speak to your doctor   If you have compliments or concerns about an experience at your clinic, or if you wish to file a complaint, please contact [facility] Patient Relations at [phone] or email us at  [email or web address]         Additional Information About Your Visit        [facility]herited Health Information     [employer] is an electronic gateway that provides easy, online access to your medical records. With [employer], you can request a clinic appointment, read your test results, renew a prescription or communicate with your care team.     To sign up for [employer] visit the website at [email or web address]   You will be asked to enter the access code listed below, as well as some personal information. Please follow the directions to create your username and password.     Your access code is: [email or web address]  Expires: 2017  6:30 AM     Your access code will  in 90 days. If you need help or a new code, please contact your [facility] or call [phone] for assistance.        Care EveryWhere ID     This is your Care EveryWhere ID. This could be used by other organizations to access your [place] medical records  [id number]        Your Vitals Were     Pulse Temperature Respirations Height Pulse Oximetry BMI (Body Mass Index)    87 98.6  F (37  C) 20 1.803 m (5' 11\") 98% 28.45 kg/m2       Blood Pressure from Last 3 Encounters:   10/05/17 137/80   10/05/17 137/80   17 122/79    Weight from Last 3 Encounters:   10/05/17 92.5 kg (204 lb)   10/05/17 92.5 kg (204 lb)   17 87.5 kg (193 lb)              Today, you had the following     No orders found for display         Today's Medication Changes          These changes are accurate as of: 10/5/17  2:22 PM.  If you have any questions, ask your nurse or doctor.               These medicines have changed or have updated prescriptions.        Dose/Directions    * SUMAtriptan 6 MG/0.5ML pen injector kit   Commonly known as:  IMITREX STATDOSE   This may have changed:  Another medication with the same name was removed. Continue taking this medication, and follow the directions you see here.   Used for:  Intractable migraine " with aura with status migrainosus   Changed by:  Tanna Menendez APRN CNP        Inject 6 mg may repeat after 2 hours if needed; MAX 6 mg/dose and 12 mg/24 hours. Limit use to no more than two days per week.   Quantity:  1 kit   Refills:  11       * SUMAtriptan Succinate Refill 6 MG/0.5ML Soct   Commonly known as:  IMITREX   This may have changed:  Another medication with the same name was removed. Continue taking this medication, and follow the directions you see here.   Used for:  Migraine with aura and with status migrainosus, not intractable   Changed by:  Tanna Menendez APRN CNP        Dose:  6 mg   Inject 0.5 mLs (6 mg) Subcutaneous at onset of headache for migraine May repeat in 2 hours as needed. Max 12 mg/1ml in 24 hours   Quantity:  1 mL   Refills:  11       * Notice:  This list has 2 medication(s) that are the same as other medications prescribed for you. Read the directions carefully, and ask your doctor or other care provider to review them with you.             Primary Care Provider    [facility]       No address on file        Equal Access to Services     KIP CLEMENTE : Latisha Frye, leonie juarez, deborah alvarez. So [facility] [phone].    ATENCIÓN: Si habla español, tiene a lovett disposición servicios gratuitos de asistencia lingüística. Llame al [phone].    We comply with applicable federal civil rights laws and [place] laws. We do not discriminate on the basis of race, color, national origin, age, disability, sex, sexual orientation, or gender identity.            Thank you!     Thank you for choosing [facility]  for your care. Our goal is always to provide you with excellent care. Hearing back from our patients is one way we can continue to improve our services. Please take a few minutes to complete the written survey that you may receive in the mail after your  visit with us. Thank you!             Your Updated Medication List - Protect others around you: Learn how to safely use, store and throw away your medicines at www.disposemymeds.org.          This list is accurate as of: 10/5/17  2:22 PM.  Always use your most recent med list.                   Brand Name Dispense Instructions for use Diagnosis    BOTOX IJ      Inject 150 Units as directed once Lot /C3 Exp 04/2020        guaiFENesin-codeine 100-10 MG/5ML Soln solution    ROBITUSSIN AC    120 mL    Take 10 mLs by mouth every 4 hours as needed for cough    Cough       hydrOXYzine 25 MG capsule    VISTARIL    20 capsule    Take 1 capsule (25 mg) by mouth every 6 hours as needed (headache)    Migraine with aura and with status migrainosus, not intractable       naproxen 500 MG Tbec    naproxen    60 tablet    Take 500 mg by mouth every 12 hours as needed    Migraine with aura and with status migrainosus, not intractable       promethazine 25 MG Suppository    PHENERGAN    30 suppository    Place 1 suppository (25 mg) rectally every 6 hours as needed for nausea    Intractable migraine with aura with status migrainosus       * SUMAtriptan 6 MG/0.5ML pen injector kit    IMITREX STATDOSE    1 kit    Inject 6 mg may repeat after 2 hours if needed; MAX 6 mg/dose and 12 mg/24 hours. Limit use to no more than two days per week.    Intractable migraine with aura with status migrainosus       * SUMAtriptan Succinate Refill 6 MG/0.5ML Soct    IMITREX    1 mL    Inject 0.5 mLs (6 mg) Subcutaneous at onset of headache for migraine May repeat in 2 hours as needed. Max 12 mg/1ml in 24 hours    Migraine with aura and with status migrainosus, not intractable       * Notice:  This list has 2 medication(s) that are the same as other medications prescribed for you. Read the directions carefully, and ask your doctor or other care provider to review them with you.

## 2017-10-05 NOTE — PROGRESS NOTES
"BOTULINUM TOXIN PROCEDURE - HEADACHE - NOTE    Chief Complaint   Patient presents with     RECHECK     UMP RETURN - BOTOX       /80 (BP Location: Left arm, Patient Position: Sitting, Cuff Size: Adult Regular)  Pulse 87  Temp 98.6  F (37  C) (Oral)  Ht 1.803 m (5' 11\")  Wt 93.9 kg (207 lb)  BMI 28.87 kg/m2       Current Outpatient Prescriptions:      OnabotulinumtoxinA (BOTOX IJ), Inject 150 Units as directed once Lot /C3 Exp 04/2020, Disp: , Rfl:      hydrOXYzine (VISTARIL) 25 MG capsule, Take 1 capsule (25 mg) by mouth every 6 hours as needed (headache), Disp: 20 capsule, Rfl: 1     SUMAtriptan (IMITREX STATDOSE) 6 MG/0.5ML pen injector kit, Inject 6 mg may repeat after 2 hours if needed; MAX 6 mg/dose and 12 mg/24 hours. Limit use to no more than two days per week., Disp: 1 kit, Rfl: 11     SUMAtriptan Succinate Refill (IMITREX) 6 MG/0.5ML SOCT, Inject 0.5 mLs (6 mg) Subcutaneous at onset of headache for migraine May repeat in 2 hours as needed. Max 12 mg/1ml in 24 hours, Disp: 1 mL, Rfl: 11     naproxen (NAPROXEN) 500 MG TBEC, Take 500 mg by mouth every 12 hours as needed, Disp: 60 tablet, Rfl: 3     promethazine (PHENERGAN) 25 MG Suppository, Place 1 suppository (25 mg) rectally every 6 hours as needed for nausea, Disp: 30 suppository, Rfl: 6     OnabotulinumtoxinA (BOTOX IJ), Inject 150 Units into the muscle Lot # /C3  Exp: 1/2020, Disp: , Rfl:      OnabotulinumtoxinA (BOTOX IJ), Inject 135 Units into the muscle Lot # /C3  Exp: 10/2019, Disp: , Rfl:      OnabotulinumtoxinA (BOTOX IJ), Inject 135 Units into the muscle Lot 3 /C3 Exp: 8/2019, Disp: , Rfl:      OnabotulinumtoxinA (BOTOX IJ), Inject 135 Units into the muscle once Lot: /C3 Exp: 06/2019, Disp: , Rfl:      ONABOTULINUMTOXINA IJ, Inject 135 Units as directed Lot: [id number] Exp: Mar 2019, Disp: , Rfl:      SUMAtriptan (IMITREX) 100 MG tablet, Take 1 tablet (100 mg) by mouth at onset of headache for migraine Take 1 tablet " (100 mg) by mouth at onset of headache for migraine. May repeat in 2 hours. Max 200 mg in 24 hours. Limit use to 2 days per week., Disp: 12 tablet, Rfl: 3     OnabotulinumtoxinA (BOTOX IJ), Inject 135 Units as directed Lot# /C3, Exp 11/2018, Disp: , Rfl:      OnabotulinumtoxinA (BOTOX IJ), Inject 135 Units as directed Lot# /C3, Exp 04/2018, Disp: , Rfl:      OnabotulinumtoxinA (BOTOX IJ), Inject 125 Units into the muscle Lot # /C3  Exp: 1/2018, Disp: , Rfl:      OnabotulinumtoxinA (BOTOX IJ), Inject 135 Units as directed Lot# /C3, Exp 09/2017, Disp: , Rfl:      OnabotulinumtoxinA (BOTOX IJ), Inject 125 Units into the muscle Lot # /C3  Exp: 4/2017, Disp: , Rfl:      guaiFENesin-codeine (ROBITUSSIN AC) 100-10 MG/5ML SOLN, Take 10 mLs by mouth every 4 hours as needed for cough, Disp: 120 mL, Rfl: 0     OnabotulinumtoxinA (BOTOX IJ), Inject 125 Units as directed Lot# /C3, Exp 01/2017, Disp: , Rfl:      OnabotulinumtoxinA (BOTOX IJ), Inject 125 Units as directed Lot# /C3, Exp 10/2016, Disp: , Rfl:      No Known Allergies     PHYSICAL EXAM:    Pleasant and cooperative.   Pt denies headache today.    No facial droop.    HPI:    Patient did go to the ER for abdominal pain. Work-up was negative and he was sent home shortly after. No other hospitalizations or changes to medical history.     We reviewed the recommended safety guidelines for  Botox from any vaccine injection, such as the seasonal flu vaccine, by a minimum of 10-14 days with Jose Eduardo Ryan. He acknowledged understanding.    RESPONSE TO PREVIOUS TREATMENT:  He received Botox injections on 7/13/17.     Last migraine headache was two weeks ago. He states that he gets good benefit from the injections that lasts two months, then shortly after the two month rachid, he gets two weeks of increased headache frequency and intensity (every 2-3 days) followed by resolution in the two weeks leading up to the shots. This is typical  for him.     At last appointment, his interval was changed from 10 weeks to 12 weeks. This has worked well for him and he would like to stick with the 12 week interval.     Side effects: None - used to have post-procedural headaches, but this stopped when we started using Bupivacaine for diluent.      1.  Headache frequency during this injection cycle:  3-4 headache days per month.  This is compared to his baseline headache frequency of daily headache days per month.     2.  Headache duration during this injection cycle:  Headache duration ranged from 1 hours to 15 hours.      3.  Headache intensity during this injection cycle:    A.  5/10  =  Typical pain level.  B.  10/10  =  Worst pain level.  C.  0/10  =  Lowest pain level.    4.  Change in headache medication usage during this injection cycle:  (For Example:  Able to decrease use of oral pain medications.) No change in use of pain medication    5.  ER Visits During This Injection Cycle:  0 (none for migraines)    6.  Functional Performance:  Change in ADL's, social interaction, days lost from work, etc. Patient reports 4 or 5 days lost from work due to headache during this injection cycle  Had to leave early from work one day.    BOTULINUM NEUROTOXIN INJECTION PROCEDURES:      VERIFICATION OF PATIENT IDENTIFICATION AND PROCEDURE     Initials   Patient Name ses   Patient  ses   Procedure Verified by: ses     Prior to the start of the procedure and with procedural staff participation, I verbally confirmed the patient s identity using two indicators, relevant allergies, that the procedure was appropriate and matched the consent or emergent situation, and that the correct equipment/implants were available. Immediately prior to starting the procedure I conducted the Time Out with the procedural staff and re-confirmed the patient s name, procedure, and site/side. (The Joint Commission universal protocol was followed.)  Yes    Sedation (Moderate or Deep):  None    Above assessments performed by:  Candida Coy MD      INDICATIONS FOR PROCEDURES:  Jose Eduardo Ryan is a 22-year-old male patient with chronic migraine headaches associated with cervicogenic components.  Jose Eduardo reports overall improvement in his migraine headache pattern, with fewer, less intense headaches since starting Botox.     His baseline symptoms have been recalcitrant to oral medications and conservative therapy. He is here today for reinjection with Botox.     GOAL OF PROCEDURE:  The goal of this procedure is to decrease pain .    TOTAL DOSE ADMINISTERED:  Dose Administered:  150 units  Botox (Botulinum Toxin Type A)       2:1 Dilution   Diluent Used:  0.5% Sensorcaine -  (NDC:6508-1542-83) (Lot # [id number], Exp: 10/1/2017)  Total Volume of Diluent Used:  3.0 ml  Lot # /C3 with Expiration Date:  04/2020  NDC #: Botox 100u ([address]-62)    Medication guide was offered to patient and was declined.    CONSENT:  The risks, benefits, and treatment options were discussed with Jose Eduardo Ryan and he agreed to proceed.    Written consent was obtained by [facility].     EQUIPMENT USED:  Needle-37mm stimulating/recording  Needle-30 gauge  EMG/NCS Machine     SKIN PREPARATION:  Skin preparation was performed using an alcohol wipe.     GUIDANCE DESCRIPTION:  Electro-myographic guidance was necessary throughout the cervical portion of the procedure to accurately identify all areas of spastic muscles while avoiding injection of non-spastic muscles, neighboring nerves and nearby vascular structures.     AREA/MUSCLE INJECTED:  150 Units  1 & 2. SHOULDER GIRDLE & NECK MUSCLES: 25 units Botox = Total Dose, 2:1 Dilution   Right Middle Trapezius (mid cervical) - 2.5 units of Botox at 1 site/s.   Left Middle Trapezius (mid cervical) - 2.5 units of Botox at 1 site/s.      Right Splenius - 5 units of Botox at 1 site/s.   Left Splenius - 5 units of Botox at 1 site/s.      Right Levator Scapulae - 5  units of Botox at 1 site/s (shoulder muscles).   Left Levator Scapulae - 5 units of Botox at 1 site/s (shoulder muscles).     3. HEAD & SCALP MUSCLES: 125 units Botox = Total Dose, 2:1 Dilution  Right Frontalis - 30 units of Botox at 4 site/s.  Left Frontalis - 25 units of Botox at 4 site/s.     Right Temporalis - 25 units of Botox at 6 site/s.  Left Temporalis - 25 units of Botox at 6 site/s.     Right  - 5 units of Botox at 1 site/s.              Left  - 5 units of Botox at 1 site/s.     Procerus - 10 units of Botox at 1 site/s.      RESPONSE TO PROCEDURE:  Jose Eduardo Ryan tolerated the procedure well and there were no immediate complications.   He was allowed to recover for an appropriate period of time and was discharged home in stable condition.    FOLLOW UP:  Jose Eduardo Ryan was asked to follow up by phone in 7-14 days with Sharon Ding PT, Care Coordinator or Iwona Chris RN, Care Coordinator, to report his response to this series of injections.  Based on the patient's previous response to this therapy, Jose Eduardo Ryan was rescheduled for the next series of injections in 12 weeks.    PLAN (Medication Changes, Therapy Orders, Work or Disability Issues, etc.): Patient will continue to monitor response to today's injections.  The patient was scheduled for his next visit in 12 weeks.     There were 50 units of Botox as unavoidable waste for this patient.

## 2017-10-05 NOTE — MR AVS SNAPSHOT
After Visit Summary   10/5/2017    Jose Eduardo Ryan    MRN: [id number]           Patient Information     Date Of Birth          1995        Visit Information        Provider Department      10/5/2017 1:10 PM Errol Coy MD [facility]        Today's Diagnoses     Intractable chronic migraine without aura and without status migrainosus    -  1       Follow-ups after your visit        Your next 10 appointments already scheduled     Dec 28, 2017  1:50 PM CST   (Arrive by 1:35 PM)   Return Botox with Errol Coy MD   [facility] ([facility])    [address] [place] [id number] [phone]            Mar 22, 2018  1:10 PM CDT   (Arrive by 12:55 PM)   Return Botox with Errol Coy MD   [facility] ([facility])    [address] [place] [id number] [phone]            Mar 22, 2018  2:00 PM CDT   (Arrive by 1:45 PM)   Return Visit with BRANDI Partida CNP   [facility] ([facility])    [address] [place] [id number] [phone]              Who to contact     Please call your clinic at [phone] to:    Ask questions about your health    Make or cancel appointments    Discuss your medicines    Learn about your test results    Speak to your doctor   If you have compliments or concerns about an experience at your clinic, or if you wish to file a complaint, please contact [facility] Patient Relations at [phone] or email us at [email or web address]         Additional Information About Your Visit        [employer] Information     [employer] is an electronic gateway that provides easy, online access to your medical records. With [employer], you can request a clinic  "appointment, read your test results, renew a prescription or communicate with your care team.     To sign up for [employer] visit the website at [email or web address]   You will be asked to enter the access code listed below, as well as some personal information. Please follow the directions to create your username and password.     Your access code is: [email or web address]  Expires: 2017  6:30 AM     Your access code will  in 90 days. If you need help or a new code, please contact your [facility] or call [phone] for assistance.        Care EveryWhere ID     This is your Care EveryWhere ID. This could be used by other organizations to access your [facility] medical records  [id number]        Your Vitals Were     Pulse Temperature Height BMI (Body Mass Index)          87 98.6  F (37  C) (Oral) 1.803 m (5' 11\") 28.45 kg/m2         Blood Pressure from Last 3 Encounters:   10/05/17 137/80   10/05/17 137/80   17 122/79    Weight from Last 3 Encounters:   10/05/17 92.5 kg (204 lb)   10/05/17 92.5 kg (204 lb)   17 87.5 kg (193 lb)              We Performed the Following     HC CHEMODENERVATE FACIAL,TRIGERM,NERV MIGRAINE     Needle EMG Guide w/Chemodenervation (03815)          Today's Medication Changes          These changes are accurate as of: 10/5/17  4:04 PM.  If you have any questions, ask your nurse or doctor.               These medicines have changed or have updated prescriptions.        Dose/Directions    * SUMAtriptan 6 MG/0.5ML pen injector kit   Commonly known as:  IMITREX STATDOSE   This may have changed:  Another medication with the same name was removed. Continue taking this medication, and follow the directions you see here.   Used for:  Intractable migraine with aura with status migrainosus   Changed by:  Tanna Menendez APRN CNP        Inject 6 mg may repeat after 2 hours if needed; MAX 6 mg/dose and 12 mg/24 hours. Limit use to no more " than two days per week.   Quantity:  1 kit   Refills:  11       * SUMAtriptan Succinate Refill 6 MG/0.5ML Soct   Commonly known as:  IMITREX   This may have changed:  Another medication with the same name was removed. Continue taking this medication, and follow the directions you see here.   Used for:  Migraine with aura and with status migrainosus, not intractable   Changed by:  Tanna Menendez APRN CNP        Dose:  6 mg   Inject 0.5 mLs (6 mg) Subcutaneous at onset of headache for migraine May repeat in 2 hours as needed. Max 12 mg/1ml in 24 hours   Quantity:  1 mL   Refills:  11       * Notice:  This list has 2 medication(s) that are the same as other medications prescribed for you. Read the directions carefully, and ask your doctor or other care provider to review them with you.             Primary Care Provider    [facility]       No address on file        Equal Access to Services     KIP CLEMENTE : Latisha Frye, leonie juarez, jennifer smith, deborah hayden . So [facility] [phone].    ATENCIÓN: Si habla español, tiene a lovett disposición servicios gratuitos de asistencia lingüística. Llame al [phone].    We comply with applicable federal civil rights laws and [place] laws. We do not discriminate on the basis of race, color, national origin, age, disability, sex, sexual orientation, or gender identity.            Thank you!     Thank you for choosing [facility] PHYSICAL MEDICINE AND REHABILITATION  for your care. Our goal is always to provide you with excellent care. Hearing back from our patients is one way we can continue to improve our services. Please take a few minutes to complete the written survey that you may receive in the mail after your visit with us. Thank you!             Your Updated Medication List - Protect others around you: Learn how to safely use, store and throw away your medicines at  www.disposemymeds.org.          This list is accurate as of: 10/5/17  4:04 PM.  Always use your most recent med list.                   Brand Name Dispense Instructions for use Diagnosis    BOTOX IJ      Inject 150 Units as directed once Lot /C3 Exp 04/2020        guaiFENesin-codeine 100-10 MG/5ML Soln solution    ROBITUSSIN AC    120 mL    Take 10 mLs by mouth every 4 hours as needed for cough    Cough       hydrOXYzine 25 MG capsule    VISTARIL    20 capsule    Take 1 capsule (25 mg) by mouth every 6 hours as needed (headache)    Migraine with aura and with status migrainosus, not intractable       naproxen 500 MG Tbec    naproxen    60 tablet    Take 500 mg by mouth every 12 hours as needed    Migraine with aura and with status migrainosus, not intractable       promethazine 25 MG Suppository    PHENERGAN    30 suppository    Place 1 suppository (25 mg) rectally every 6 hours as needed for nausea    Intractable migraine with aura with status migrainosus       * SUMAtriptan 6 MG/0.5ML pen injector kit    IMITREX STATDOSE    1 kit    Inject 6 mg may repeat after 2 hours if needed; MAX 6 mg/dose and 12 mg/24 hours. Limit use to no more than two days per week.    Intractable migraine with aura with status migrainosus       * SUMAtriptan Succinate Refill 6 MG/0.5ML Soct    IMITREX    1 mL    Inject 0.5 mLs (6 mg) Subcutaneous at onset of headache for migraine May repeat in 2 hours as needed. Max 12 mg/1ml in 24 hours    Migraine with aura and with status migrainosus, not intractable       * Notice:  This list has 2 medication(s) that are the same as other medications prescribed for you. Read the directions carefully, and ask your doctor or other care provider to review them with you.

## 2017-10-05 NOTE — LETTER
"10/5/2017       RE: Jose Eduardo Ryan  [address] [place] [address]     Dear Colleague,    Thank you for referring your patient, Jose Eduardo Ryan, to the [facility] PH[facility]ICAL [facility]ICINE AND REHABILITATION at [facility]. Please see a copy of my visit note below.    BOTULINUM TOXIN PROCEDURE - HEADACHE - NOTE    Chief Complaint   Patient presents with     RECHECK     UMP RETURN - BOTOX       /80 (BP Location: Left arm, Patient Position: Sitting, Cuff Size: Adult Regular)  Pulse 87  Temp 98.6  F (37  C) (Oral)  Ht 1.803 m (5' 11\")  Wt 93.9 kg (207 lb)  BMI 28.87 kg/m2       Current Outpatient Prescriptions:      OnabotulinumtoxinA (BOTOX IJ), Inject 150 Units as directed once Lot /C3 Exp 04/2020, Disp: , Rfl:      hydrOXYzine (VISTARIL) 25 MG capsule, Take 1 capsule (25 mg) by mouth every 6 hours as needed (headache), Disp: 20 capsule, Rfl: 1     SUMAtriptan (IMITREX STATDOSE) 6 MG/0.5ML pen injector kit, Inject 6 mg may repeat after 2 hours if needed; MAX 6 mg/dose and 12 mg/24 hours. Limit use to no more than two days per week., Disp: 1 kit, Rfl: 11     SUMAtriptan Succinate Refill (IMITREX) 6 MG/0.5ML SOCT, Inject 0.5 mLs (6 mg) Subcutaneous at onset of headache for migraine May repeat in 2 hours as needed. Max 12 mg/1ml in 24 hours, Disp: 1 mL, Rfl: 11     naproxen (NAPROXEN) 500 MG TBEC, Take 500 mg by mouth every 12 hours as needed, Disp: 60 tablet, Rfl: 3     promethazine (PHENERGAN) 25 MG Suppository, Place 1 suppository (25 mg) rectally every 6 hours as needed for nausea, Disp: 30 suppository, Rfl: 6     OnabotulinumtoxinA (BOTOX IJ), Inject 150 Units into the muscle Lot # /C3  Exp: 1/2020, Disp: , Rfl:      OnabotulinumtoxinA (BOTOX IJ), Inject 135 Units into the muscle Lot # /C3  Exp: 10/2019, Disp: , Rfl:      OnabotulinumtoxinA (BOTOX IJ), Inject 135 Units into the muscle Lot 3 /C3 Exp: 8/2019, Disp: , Rfl:      OnabotulinumtoxinA (BOTOX IJ), " Inject 135 Units into the muscle once Lot: /C3 Exp: 06/2019, Disp: , Rfl:      ONABOTULINUMTOXINA IJ, Inject 135 Units as directed Lot: [id number] Exp: Mar 2019, Disp: , Rfl:      SUMAtriptan (IMITREX) 100 MG tablet, Take 1 tablet (100 mg) by mouth at onset of headache for migraine Take 1 tablet (100 mg) by mouth at onset of headache for migraine. May repeat in 2 hours. Max 200 mg in 24 hours. Limit use to 2 days per week., Disp: 12 tablet, Rfl: 3     OnabotulinumtoxinA (BOTOX IJ), Inject 135 Units as directed Lot# /C3, Exp 11/2018, Disp: , Rfl:      OnabotulinumtoxinA (BOTOX IJ), Inject 135 Units as directed Lot# /C3, Exp 04/2018, Disp: , Rfl:      OnabotulinumtoxinA (BOTOX IJ), Inject 125 Units into the muscle Lot # /C3  Exp: 1/2018, Disp: , Rfl:      OnabotulinumtoxinA (BOTOX IJ), Inject 135 Units as directed Lot# /C3, Exp 09/2017, Disp: , Rfl:      OnabotulinumtoxinA (BOTOX IJ), Inject 125 Units into the muscle Lot # /C3  Exp: 4/2017, Disp: , Rfl:      guaiFENesin-codeine (ROBITUSSIN AC) 100-10 MG/5ML SOLN, Take 10 mLs by mouth every 4 hours as needed for cough, Disp: 120 mL, Rfl: 0     OnabotulinumtoxinA (BOTOX IJ), Inject 125 Units as directed Lot# /C3, Exp 01/2017, Disp: , Rfl:      OnabotulinumtoxinA (BOTOX IJ), Inject 125 Units as directed Lot# /C3, Exp 10/2016, Disp: , Rfl:      No Known Allergies     PHYSICAL EXAM:    Pleasant and cooperative.   Pt denies headache today.    No facial droop.    HPI:    Patient did go to the ER for abdominal pain. Work-up was negative and he was sent home shortly after. No other hospitalizations or changes to medical history.     We reviewed the recommended safety guidelines for  Botox from any vaccine injection, such as the seasonal flu vaccine, by a minimum of 10-14 days with Jose Eduardo Ryan. He acknowledged understanding.    RESPONSE TO PREVIOUS TREATMENT:  He received Botox injections on 7/13/17.     Last migraine headache  was two weeks ago. He states that he gets good benefit from the injections that lasts two months, then shortly after the two month rachid, he gets two weeks of increased headache frequency and intensity (every 2-3 days) followed by resolution in the two weeks leading up to the shots. This is typical for him.     At last appointment, his interval was changed from 10 weeks to 12 weeks. This has worked well for him and he would like to stick with the 12 week interval.     Side effects: None - used to have post-procedural headaches, but this stopped when we started using Bupivacaine for diluent.      1.  Headache frequency during this injection cycle:  3-4 headache days per month.  This is compared to his baseline headache frequency of daily headache days per month.     2.  Headache duration during this injection cycle:  Headache duration ranged from 1 hours to 15 hours.      3.  Headache intensity during this injection cycle:    A.  5/10  =  Typical pain level.  B.  10/10  =  Worst pain level.  C.  0/10  =  Lowest pain level.    4.  Change in headache medication usage during this injection cycle:  (For Example:  Able to decrease use of oral pain medications.) No change in use of pain medication    5.  ER Visits During This Injection Cycle:  0 (none for migraines)    6.  Functional Performance:  Change in ADL's, social interaction, days lost from work, etc. Patient reports 4 or 5 days lost from work due to headache during this injection cycle  Had to leave early from work one day.    BOTULINUM NEUROTOXIN INJECTION PROCEDURES:      VERIFICATION OF PATIENT IDENTIFICATION AND PROCEDURE     Initials   Patient Name ses   Patient  ses   Procedure Verified by: ses     Prior to the start of the procedure and with procedural staff participation, I verbally confirmed the patient s identity using two indicators, relevant allergies, that the procedure was appropriate and matched the consent or emergent situation, and that the  correct equipment/implants were available. Immediately prior to starting the procedure I conducted the Time Out with the procedural staff and re-confirmed the patient s name, procedure, and site/side. (The Joint Commission universal protocol was followed.)  Yes    Sedation (Moderate or Deep): None    Above assessments performed by:  Candida Coy MD      INDICATIONS FOR PROCEDURES:  Jose Eduardo Ryan is a 22-year-old male patient with chronic migraine headaches associated with cervicogenic components.  Jose Eduardo reports overall improvement in his migraine headache pattern, with fewer, less intense headaches since starting Botox.     His baseline symptoms have been recalcitrant to oral medications and conservative therapy. He is here today for reinjection with Botox.     GOAL OF PROCEDURE:  The goal of this procedure is to decrease pain .    TOTAL DOSE ADMINISTERED:  Dose Administered:  150 units  Botox (Botulinum Toxin Type A)       2:1 Dilution   Diluent Used:  0.5% Sensorcaine -  (NDC:3174-6946-82) (Lot # [id number], Exp: 10/1/2017)  Total Volume of Diluent Used:  3.0 ml  Lot # /C3 with Expiration Date:  04/2020  NDC #: Botox 100u ([address]-97)    Medication guide was offered to patient and was declined.    CONSENT:  The risks, benefits, and treatment options were discussed with Jose Eduardo Ryan and he agreed to proceed.    Written consent was obtained by [facility].     EQUIPMENT USED:  Needle-37mm stimulating/recording  Needle-30 gauge  EMG/NCS Machine     SKIN PREPARATION:  Skin preparation was performed using an alcohol wipe.     GUIDANCE DESCRIPTION:  Electro-myographic guidance was necessary throughout the cervical portion of the procedure to accurately identify all areas of spastic muscles while avoiding injection of non-spastic muscles, neighboring nerves and nearby vascular structures.     AREA/MUSCLE INJECTED:  150 Units  1 & 2. SHOULDER GIRDLE & NECK MUSCLES: 25 units Botox = Total  Dose, 2:1 Dilution   Right Middle Trapezius (mid cervical) - 2.5 units of Botox at 1 site/s.   Left Middle Trapezius (mid cervical) - 2.5 units of Botox at 1 site/s.      Right Splenius - 5 units of Botox at 1 site/s.   Left Splenius - 5 units of Botox at 1 site/s.      Right Levator Scapulae - 5 units of Botox at 1 site/s (shoulder muscles).   Left Levator Scapulae - 5 units of Botox at 1 site/s (shoulder muscles).     3. HEAD & SCALP MUSCLES: 125 units Botox = Total Dose, 2:1 Dilution  Right Frontalis - 30 units of Botox at 4 site/s.  Left Frontalis - 25 units of Botox at 4 site/s.     Right Temporalis - 25 units of Botox at 6 site/s.  Left Temporalis - 25 units of Botox at 6 site/s.     Right  - 5 units of Botox at 1 site/s.              Left  - 5 units of Botox at 1 site/s.     Procerus - 10 units of Botox at 1 site/s.      RESPONSE TO PROCEDURE:  Jose Eduardo Ryan tolerated the procedure well and there were no immediate complications.   He was allowed to recover for an appropriate period of time and was discharged home in stable condition.    FOLLOW UP:  Jose Eduardo Ryan was asked to follow up by phone in 7-14 days with Sharon Ding PT, Care Coordinator or Iwona Chris RN, Care Coordinator, to report his response to this series of injections.  Based on the patient's previous response to this therapy, Jose Eduardo Ryan was rescheduled for the next series of injections in 12 weeks.    PLAN (Medication Changes, Therapy Orders, Work or Disability Issues, etc.): Patient will continue to monitor response to today's injections.  The patient was scheduled for his next visit in 12 weeks.     There were 50 units of Botox as unavoidable waste for this patient.    Again, thank you for allowing me to participate in the care of your patient.      Sincerely,    Errol Coy MD

## 2017-10-05 NOTE — PROGRESS NOTES
"Re: Jose Eduardo Ryan      MRN# [id number]  YOB: 1995  Date of Visit:10/5/2017     OUTPATIENT NEUROLOGY VISIT NOTE    Reason for Visit/Chief Complaint:  Headache follow up    Interval History:  Jose Eduardo Ryan is a 22-year-old male presents to the clinic today for headache follow up.    Patient received Botox injections today with Dr Coy. In the past 3 months-about 3 \"bad migraine\" headaches (need for sumatriptan) and about 3-4 \"normal headaches\" (no need for sumatriptan). Reports that he used sumatriptan inj 3 times. Patient reports that he does take sumatriptan injectable and he does not take sumatriptan oral pills. Patient reports taking naproxen 500 mg at the onset of head. Used promethazine suppositories once and finds it very helpful.   Patient does not need any refills today. All of his medications are up to date.   No new concerns today.     Plan discussed with the patient:  No new changes in acute headache treatment plan -sumatriptan injectable+naproxen 1-2 tablets as needed every 12 hours +promethazine suppositories.   Keep your acute headache medications close to you all the time.   Rest, hydration and good sleep.   Follow up in 6 months or sooner if needed      Past Medical History reviewed   Social History   Substance Use Topics     Smoking status: Current Every Day Smoker     Packs/day: 0.30     Years: 2.00     Types: Cigarettes     Smokeless tobacco: Current User     Alcohol use No      Comment: rarely    reviewed and verified with the patient   No Known Allergies    Current Outpatient Prescriptions   Medication Sig Dispense Refill     OnabotulinumtoxinA (BOTOX IJ) Inject 150 Units as directed once Lot /C3  Exp 04/2020       hydrOXYzine (VISTARIL) 25 MG capsule Take 1 capsule (25 mg) by mouth every 6 hours as needed (headache) 20 capsule 1     SUMAtriptan (IMITREX STATDOSE) 6 MG/0.5ML pen injector kit Inject 6 mg may repeat after 2 hours if needed; MAX 6 mg/dose and 12 mg/24 hours. " "Limit use to no more than two days per week. 1 kit 11     SUMAtriptan Succinate Refill (IMITREX) 6 MG/0.5ML SOCT Inject 0.5 mLs (6 mg) Subcutaneous at onset of headache for migraine May repeat in 2 hours as needed. Max 12 mg/1ml in 24 hours 1 mL 11     naproxen (NAPROXEN) 500 MG TBEC Take 500 mg by mouth every 12 hours as needed 60 tablet 3     promethazine (PHENERGAN) 25 MG Suppository Place 1 suppository (25 mg) rectally every 6 hours as needed for nausea 30 suppository 6     SUMAtriptan (IMITREX) 100 MG tablet Take 1 tablet (100 mg) by mouth at onset of headache for migraine Take 1 tablet (100 mg) by mouth at onset of headache for migraine. May repeat in 2 hours. Max 200 mg in 24 hours. Limit use to 2 days per week. 12 tablet 3     guaiFENesin-codeine (ROBITUSSIN AC) 100-10 MG/5ML SOLN Take 10 mLs by mouth every 4 hours as needed for cough 120 mL 0   reviewed and verified with the patient    Review of Systems:   A 10-point ROS including constitutional, eyes, respiratory, cardiovascular, gastroenterology, genitourinary, integumentary, musculoskeletal, neurology and psychiatric were all negative except as mentioned in the interval history.    General Exam:   /80  Pulse 87  Temp 98.6  F (37  C)  Resp 20  Ht 1.803 m (5' 11\")  Wt 92.5 kg (204 lb)  SpO2 98%  BMI 28.45 kg/m2  GEN: Awake, NAD; good eye contact, responses appropriately. Speech is fluent without dysarthria. EOM intact. There is no nystagmus. Has conjugated gaze. Face is symmetrical. Intact and symmetrical eyebrow and lid raise and eyelid closure, smiles. Normal casual gait.  Assessment and Plan:  See Interval History for our discussion and plan      I discussed all my recommendation with Jose Eduardo Ryan. The patient verbalizes understanding and comfortable with the plan. The patient has our clinic phone number to call with any questions or concerns. All of the patient's questions were answered from the best of my current knowledge.       Time " spent with pt answering questions, discussing findings, counseling and coordinating care was more than 50% the appointment time, 10  minutes.         BRANDI Ma, CNP  [facility]

## 2017-10-05 NOTE — PATIENT INSTRUCTIONS
Plan:  No new changes in acute headache treatment plan -sumatriptan injectable+naproxen 1-2 tablets as needed every 12 hours +promethazine suppositories.   Keep your acute headache medications close to you all the time.   Rest, hydration and good sleep.   Follow up in 6 months or sooner if needed

## 2017-12-20 DIAGNOSIS — G43.111 INTRACTABLE MIGRAINE WITH AURA WITH STATUS MIGRAINOSUS: ICD-10-CM

## 2017-12-20 NOTE — TELEPHONE ENCOUNTER
----- Message from Irena Sanchez sent at 12/20/2017  9:56 AM CST -----  Regarding: Tanna Menendez Refill request for Rx. SUMAtriptan (IMITREX STATDOSE)   Contact: [phone]  Pt mother calling asking for a refill of the Rx. SUMAtriptan (IMITREX STATDOSE). Per pt mother the pt will be traveling and needs the Rx. Refilled. Pt would like script sent to the [facility] [employer] [address] [place] [address] [facility] AT [address]. Pt is asking for a call back with confirmation.  Pt can be reached at [phone]    Thank You,  Irena    Please DO NOT send this message and/or reply back to sender.  Call Center Representatives DO NOT respond to messages.

## 2017-12-20 NOTE — TELEPHONE ENCOUNTER
Per pharmacy patient has filled all 11 refills that were sent on 7/13/17. Refill pended for NP to sign. LVMM informing patient.

## 2017-12-21 RX ORDER — CEFUROXIME AXETIL 250 MG/1
TABLET ORAL
Qty: 3 KIT | Refills: 1 | OUTPATIENT
Start: 2017-12-21

## 2017-12-21 RX ORDER — CEFUROXIME AXETIL 250 MG/1
TABLET ORAL
Qty: 1 KIT | Refills: 11 | Status: SHIPPED | OUTPATIENT
Start: 2017-12-21 | End: 2018-05-04

## 2017-12-28 ENCOUNTER — OFFICE VISIT (OUTPATIENT)
Dept: PHYSICAL MEDICINE AND REHAB | Facility: CLINIC | Age: 22
End: 2017-12-28
Payer: COMMERCIAL

## 2017-12-28 VITALS
HEIGHT: 71 IN | OXYGEN SATURATION: 98 % | TEMPERATURE: 97.8 F | BODY MASS INDEX: 29.89 KG/M2 | DIASTOLIC BLOOD PRESSURE: 79 MMHG | WEIGHT: 213.5 LBS | SYSTOLIC BLOOD PRESSURE: 135 MMHG | RESPIRATION RATE: 20 BRPM | HEART RATE: 79 BPM

## 2017-12-28 DIAGNOSIS — G43.719 INTRACTABLE CHRONIC MIGRAINE WITHOUT AURA AND WITHOUT STATUS MIGRAINOSUS: Primary | ICD-10-CM

## 2017-12-28 RX ORDER — SUMATRIPTAN 100 MG/1
1 TABLET, FILM COATED ORAL PRN
Refills: 2 | COMMUNITY
Start: 2017-03-11 | End: 2019-12-05

## 2017-12-28 ASSESSMENT — PAIN SCALES - GENERAL: PAINLEVEL: MILD PAIN (3)

## 2017-12-28 NOTE — PROGRESS NOTES
"BOTULINUM TOXIN PROCEDURE - HEADACHE - NOTE    Chief Complaint   Patient presents with     Headache     UMP- BOTOX-MIGRAINE       /79  Pulse 79  Temp 97.8  F (36.6  C) (Oral)  Resp 20  Ht 1.803 m (5' 11\")  Wt 96.8 kg (213 lb 8 oz)  SpO2 98%  BMI 29.78 kg/m2       Current Outpatient Prescriptions:      SUMAtriptan (IMITREX STATDOSE) 6 MG/0.5ML pen injector kit, Inject 6 mg may repeat after 2 hours if needed; MAX 6 mg/dose and 12 mg/24 hours. Limit use to no more than two days per week., Disp: 1 kit, Rfl: 11     OnabotulinumtoxinA (BOTOX IJ), Inject 150 Units as directed once Lot /C3 Exp 04/2020, Disp: , Rfl:      naproxen (NAPROXEN) 500 MG TBEC, Take 500 mg by mouth every 12 hours as needed, Disp: 60 tablet, Rfl: 3     promethazine (PHENERGAN) 25 MG Suppository, Place 1 suppository (25 mg) rectally every 6 hours as needed for nausea, Disp: 30 suppository, Rfl: 6     guaiFENesin-codeine (ROBITUSSIN AC) 100-10 MG/5ML SOLN, Take 10 mLs by mouth every 4 hours as needed for cough, Disp: 120 mL, Rfl: 0     SUMAtriptan (IMITREX) 100 MG tablet, Take 1 tablet by mouth as needed, Disp: , Rfl: 2     No Known Allergies     PHYSICAL EXAM:    Pt reports Migraine Headache today rates 4/10 started 2 hours ago, took Advil.    HPI:    Patient denies new medical diagnoses, illnesses, hospitalizations, emergency room visits, and injuries since the previous injection with botulinum neurotoxin.    We reviewed the recommended safety guidelines for  Botox from any vaccine injection, such as the seasonal flu vaccine, by a minimum of 10-14 days with Jose Eduardo Ryan. He acknowledged understanding.    RESPONSE TO PREVIOUS TREATMENT:  Change in headache pattern following last series of injections with 150 units of  Botox on 10/5/2017.    No problems reported    1.  Headache frequency during this injection cycle:  Pt states he had only 2 headache days from last injections on 10/5/2017 until just before Thanksgiving then " has had 3 headache days per week since.  This is compared to his baseline headache frequency of daily headache days per month.     2.  Headache duration during this injection cycle:  Headache duration ranged from 1 hour to 2 days.     3.  Headache intensity during this injection cycle:    A.  6/10  =  Typical pain level.  B.  10/10  =  Worst pain level.  C.  0/10  =  Lowest pain level.    4.  Change in headache medication usage during this injection cycle:  (For Example:  Able to decrease use of oral pain medications.) initially from 10/5 to end of November Pt had to use pain med only once.  During December his headaches have increased and he has had to increase use injectable Imitrex more frequently.    5.  ER Visits During This Injection Cycle:  0    6.  Functional Performance:  Change in ADL's, social interaction, days lost from work, etc. Patient reports 1 days lost from work due to headache during this injection cycle      BOTULINUM NEUROTOXIN INJECTION PROCEDURES:      VERIFICATION OF PATIENT IDENTIFICATION AND PROCEDURE     Initials   Patient Name lmd   Patient  lmd   Procedure Verified by: nicolas     Prior to the start of the procedure and with procedural staff participation, I verbally confirmed the patient s identity using two indicators, relevant allergies, that the procedure was appropriate and matched the consent or emergent situation, and that the correct equipment/implants were available. Immediately prior to starting the procedure I conducted the Time Out with the procedural staff and re-confirmed the patient s name, procedure, and site/side. (The Joint Commission universal protocol was followed.)  Yes    Sedation (Moderate or Deep): None    Above assessments performed by:  Iwona Lopez RN Care Coordinator    Errol oCy MD      INDICATIONS FOR PROCEDURES:  Jose Eduardo Ryan is a 22-year-old male patient with chronic migraine headaches associated with cervicogenic components.  Jose Eduardo reports  overall improvement in his migraine headache pattern, with fewer, less intense headaches since starting Botox.      His baseline symptoms have been recalcitrant to oral medications and conservative therapy. He is here today for reinjection with Botox.      GOAL OF PROCEDURE:  The goal of this procedure is to decrease pain .    TOTAL DOSE ADMINISTERED:  Dose Administered:  150 units  Botox (Botulinum Toxin Type A)       2:1 Dilution   Diluent Used:  0.50% Sensorcaine  Total Volume of Diluent Used:  3.0 ml  Lot # /C3 with Expiration Date:  7/2020  NDC #: Botox 100u ([address]-53)    Medication guide was offered to patient and was declined.    CONSENT:  The risks, benefits, and treatment options were discussed with Jose Eduardo Ryan and he agreed to proceed.    Written consent was obtained by lmd.     EQUIPMENT USED:  Needle-37mm stimulating/recording  Needle-30 gauge  EMG/NCS Machine      SKIN PREPARATION:  Skin preparation was performed using an alcohol wipe.      GUIDANCE DESCRIPTION:  Electro-myographic guidance was necessary throughout the cervical portion of the procedure to accurately identify all areas of spastic muscles while avoiding injection of non-spastic muscles, neighboring nerves and nearby vascular structures.     AREA/MUSCLE INJECTED: 150 Units  1 & 2. SHOULDER GIRDLE & NECK MUSCLES: 25 units Botox = Total Dose, 2:1 Dilution   Right Middle Trapezius (mid cervical) - 2.5 units of Botox at 1 site/s.   Left Middle Trapezius (mid cervical) - 2.5 units of Botox at 1 site/s.       Right Splenius - 5 units of Botox at 1 site/s.   Left Splenius - 5 units of Botox at 1 site/s.       Right Levator Scapulae - 5 units of Botox at 1 site/s (shoulder muscles).   Left Levator Scapulae - 5 units of Botox at 1 site/s (shoulder muscles).      3. HEAD & SCALP MUSCLES: 125 units Botox = Total Dose, 2:1 Dilution  Right Frontalis - 30 units of Botox at 4 site/s.  Left Frontalis - 25 units of Botox at 4  site/s.      Right Temporalis - 25 units of Botox at 6 site/s.  Left Temporalis - 25 units of Botox at 6 site/s.      Right  - 5 units of Botox at 1 site/s.              Left  - 5 units of Botox at 1 site/s.      Procerus - 10 units of Botox at 1 site/s.    RESPONSE TO PROCEDURE:  Jose Eduardo Ryan tolerated the procedure well and there were no immediate complications.   He was allowed to recover for an appropriate period of time and was discharged home in stable condition.    FOLLOW UP:  Jose Eduardo Ryan was asked to follow up by phone in 7-14 days with Sharon Ding PT, Care Coordinator or Iwona Chris RN, Care Coordinator, to report his response to this series of injections.  Based on the patient's previous response to this therapy, Jose Eduardo Ryan was rescheduled for the next series of injections in 12 weeks.    PLAN (Medication Changes, Therapy Orders, Work or Disability Issues, etc.): Patient will continue to monitor response to today's injections.

## 2017-12-28 NOTE — MR AVS SNAPSHOT
After Visit Summary   12/28/2017    Jose Eduardo Ryan    MRN: [id number]           Patient Information     Date Of Birth          1995        Visit Information        Provider Department      12/28/2017 1:50 PM Errol Coy MD [facility] Physical Medicine and Rehabilitation        Today's Diagnoses     Intractable chronic migraine without aura and without status migrainosus    -  1       Follow-ups after your visit        Follow-up notes from your care team     Return in about 12 weeks (around 3/22/2018) for Headache.      Your next 10 appointments already scheduled     Mar 22, 2018  1:10 PM CDT   (Arrive by 12:55 PM)   Return Botox with Errol Coy MD   [facility] Physical Medicine and Rehabilitation ([facility])    [address] [place] [id number] [phone]            Mar 22, 2018  2:00 PM CDT   (Arrive by 1:45 PM)   Return Visit with BRANDI Partida [facility] Neurology ([facility])    [address] [place] [id number] [phone]            Jun 14, 2018  1:10 PM CDT   (Arrive by 12:55 PM)   Return Botox with Errol Coy MD   [facility] Physical Medicine and Rehabilitation ([facility])    [address] [place] [id number] [phone]              Who to contact     Please call your clinic at [phone] to:    Ask questions about your health    Make or cancel appointments    Discuss your medicines    Learn about your test results    Speak to your doctor   If you have compliments or concerns about an experience at your clinic, or if you wish to file a complaint, please contact [facility] Patient Relations at [phone] or email us at [email or web address]         Additional Information About Your Visit        MyChart Information     MyChart is an electronic  "gateway that provides easy, online access to your medical records. With [email or web address], you can request a clinic appointment, read your test results, renew a prescription or communicate with your care team.     To sign up for [employer] visit the website at [email or web address]   You will be asked to enter the access code listed below, as well as some personal information. Please follow the directions to create your username and password.     Your access code is: [email or web address]  Expires: 3/28/2018  4:49 PM     Your access code will  in 90 days. If you need help or a new code, please contact your [facility] or call [phone] for assistance.        Care EveryWhere ID     This is your Care EveryWhere ID. This could be used by other organizations to access your [place] medical records  [id number]        Your Vitals Were     Pulse Temperature Respirations Height Pulse Oximetry BMI (Body Mass Index)    79 97.8  F (36.6  C) (Oral) 20 1.803 m (5' 11\") 98% 29.78 kg/m2       Blood Pressure from Last 3 Encounters:   17 135/79   10/05/17 137/80   10/05/17 137/80    Weight from Last 3 Encounters:   17 96.8 kg (213 lb 8 oz)   10/05/17 92.5 kg (204 lb)   10/05/17 92.5 kg (204 lb)              We Performed the Following     HC CHEMODENERVATE FACIAL,TRIGERM,NERV MIGRAINE     Needle EMG Guide w/Chemodenervation (30521)        Primary Care Provider    [facility]       No address on file        Equal Access to Services     KIP CLEMENTE : Hadii lisa henry Soroberto, waaxda luqadaha, qaybta kaalmadeborah cardoso. So [facility] [phone].    ATENCIÓN: Si habla español, tiene a lovett disposición servicios gratuitos de asistencia lingüística. Llame al [phone].    We comply with applicable federal civil rights laws and [place] laws. We do not discriminate on the basis of race, color, national origin, age, disability, sex, sexual " orientation, or gender identity.            Thank you!     Thank you for choosing [facility] PHYSICAL MEDICINE AND REHABILITATION  for your care. Our goal is always to provide you with excellent care. Hearing back from our patients is one way we can continue to improve our services. Please take a few minutes to complete the written survey that you may receive in the mail after your visit with us. Thank you!             Your Updated Medication List - Protect others around you: Learn how to safely use, store and throw away your medicines at www.disposemymeds.org.          This list is accurate as of: 12/28/17  4:49 PM.  Always use your most recent med list.                   Brand Name Dispense Instructions for use Diagnosis    * BOTOX IJ      Inject 150 Units as directed once Lot /C3 Exp 04/2020        * BOTOX IJ      Inject 150 Units into the muscle Lot # /C3  Exp: 7/2020        guaiFENesin-codeine 100-10 MG/5ML Soln solution    ROBITUSSIN AC    120 mL    Take 10 mLs by mouth every 4 hours as needed for cough    Cough       naproxen 500 MG Tbec    naproxen    60 tablet    Take 500 mg by mouth every 12 hours as needed    Migraine with aura and with status migrainosus, not intractable       promethazine 25 MG Suppository    PHENERGAN    30 suppository    Place 1 suppository (25 mg) rectally every 6 hours as needed for nausea    Intractable migraine with aura with status migrainosus       * SUMAtriptan 100 MG tablet    IMITREX     Take 1 tablet by mouth as needed        * SUMAtriptan 6 MG/0.5ML pen injector kit    IMITREX STATDOSE    1 kit    Inject 6 mg may repeat after 2 hours if needed; MAX 6 mg/dose and 12 mg/24 hours. Limit use to no more than two days per week.    Intractable migraine with aura with status migrainosus       * Notice:  This list has 4 medication(s) that are the same as other medications prescribed for you. Read the directions carefully, and ask your doctor or other care provider to review  them with you.

## 2017-12-28 NOTE — LETTER
"12/28/2017       RE: Jose Eduardo Ryan  [address] [place] [address]     Dear Colleague,    Thank you for referring your patient, Jose Eduardo Ryan, to the [facility] PHYSICAL MEDICINE AND REHABILITATION at [facility]. Please see a copy of my visit note below.    BOTULINUM TOXIN PROCEDURE - HEADACHE - NOTE    Chief Complaint   Patient presents with     Headache     UMP- BOTOX-MIGRAINE       /79  Pulse 79  Temp 97.8  F (36.6  C) (Oral)  Resp 20  Ht 1.803 m (5' 11\")  Wt 96.8 kg (213 lb 8 oz)  SpO2 98%  BMI 29.78 kg/m2       Current Outpatient Prescriptions:      SUMAtriptan (IMITREX STATDOSE) 6 MG/0.5ML pen injector kit, Inject 6 mg may repeat after 2 hours if needed; MAX 6 mg/dose and 12 mg/24 hours. Limit use to no more than two days per week., Disp: 1 kit, Rfl: 11     OnabotulinumtoxinA (BOTOX IJ), Inject 150 Units as directed once Lot /C3 Exp 04/2020, Disp: , Rfl:      naproxen (NAPROXEN) 500 MG TBEC, Take 500 mg by mouth every 12 hours as needed, Disp: 60 tablet, Rfl: 3     promethazine (PHENERGAN) 25 MG Suppository, Place 1 suppository (25 mg) rectally every 6 hours as needed for nausea, Disp: 30 suppository, Rfl: 6     guaiFENesin-codeine (ROBITUSSIN AC) 100-10 MG/5ML SOLN, Take 10 mLs by mouth every 4 hours as needed for cough, Disp: 120 mL, Rfl: 0     SUMAtriptan (IMITREX) 100 MG tablet, Take 1 tablet by mouth as needed, Disp: , Rfl: 2     No Known Allergies     PHYSICAL EXAM:    Pt reports Migraine Headache today rates 4/10 started 2 hours ago, took Advil.    HPI:    Patient denies new medical diagnoses, illnesses, hospitalizations, emergency room visits, and injuries since the previous injection with botulinum neurotoxin.    We reviewed the recommended safety guidelines for  Botox from any vaccine injection, such as the seasonal flu vaccine, by a minimum of 10-14 days with Jose Eduardo Ryan. He acknowledged understanding.    RESPONSE TO PREVIOUS " TREATMENT:  Change in headache pattern following last series of injections with 150 units of  Botox on 10/5/2017.    No problems reported    1.  Headache frequency during this injection cycle:  Pt states he had only 2 headache days from last injections on 10/5/2017 until just before Thanksgiving then has had 3 headache days per week since.  This is compared to his baseline headache frequency of daily headache days per month.     2.  Headache duration during this injection cycle:  Headache duration ranged from 1 hour to 2 days.     3.  Headache intensity during this injection cycle:    A.  6/10  =  Typical pain level.  B.  10/10  =  Worst pain level.  C.  0/10  =  Lowest pain level.    4.  Change in headache medication usage during this injection cycle:  (For Example:  Able to decrease use of oral pain medications.) initially from 10/5 to end of November Pt had to use pain med only once.  During December his headaches have increased and he has had to increase use injectable Imitrex more frequently.    5.  ER Visits During This Injection Cycle:  0    6.  Functional Performance:  Change in ADL's, social interaction, days lost from work, etc. Patient reports 1 days lost from work due to headache during this injection cycle      BOTULINUM NEUROTOXIN INJECTION PROCEDURES:      VERIFICATION OF PATIENT IDENTIFICATION AND PROCEDURE     Initials   Patient Name lmd   Patient  lmd   Procedure Verified by: nicolas     Prior to the start of the procedure and with procedural staff participation, I verbally confirmed the patient s identity using two indicators, relevant allergies, that the procedure was appropriate and matched the consent or emergent situation, and that the correct equipment/implants were available. Immediately prior to starting the procedure I conducted the Time Out with the procedural staff and re-confirmed the patient s name, procedure, and site/side. (The Joint Commission universal protocol was followed.)   Yes    Sedation (Moderate or Deep): None    Above assessments performed by:  Iwona Lopez RN Care Coordinator    Errol Coy MD      INDICATIONS FOR PROCEDURES:  Jose Eduardo Ryan is a 22-year-old male patient with chronic migraine headaches associated with cervicogenic components.  Jose Eduardo reports overall improvement in his migraine headache pattern, with fewer, less intense headaches since starting Botox.      His baseline symptoms have been recalcitrant to oral medications and conservative therapy. He is here today for reinjection with Botox.      GOAL OF PROCEDURE:  The goal of this procedure is to decrease pain .    TOTAL DOSE ADMINISTERED:  Dose Administered:  150 units  Botox (Botulinum Toxin Type A)       2:1 Dilution   Diluent Used:  0.50% Sensorcaine  Total Volume of Diluent Used:  3.0 ml  Lot # /C3 with Expiration Date:  7/2020  NDC #: Botox 100u ([address]-18)    Medication guide was offered to patient and was declined.    CONSENT:  The risks, benefits, and treatment options were discussed with Jose Eduardo Ryan and he agreed to proceed.    Written consent was obtained by lmd.     EQUIPMENT USED:  Needle-37mm stimulating/recording  Needle-30 gauge  EMG/NCS Machine      SKIN PREPARATION:  Skin preparation was performed using an alcohol wipe.      GUIDANCE DESCRIPTION:  Electro-myographic guidance was necessary throughout the cervical portion of the procedure to accurately identify all areas of spastic muscles while avoiding injection of non-spastic muscles, neighboring nerves and nearby vascular structures.     AREA/MUSCLE INJECTED: 150 Units  1 & 2. SHOULDER GIRDLE & NECK MUSCLES: 25 units Botox = Total Dose, 2:1 Dilution   Right Middle Trapezius (mid cervical) - 2.5 units of Botox at 1 site/s.   Left Middle Trapezius (mid cervical) - 2.5 units of Botox at 1 site/s.       Right Splenius - 5 units of Botox at 1 site/s.   Left Splenius - 5 units of Botox at 1 site/s.       Right Levator Scapulae - 5  units of Botox at 1 site/s (shoulder muscles).   Left Levator Scapulae - 5 units of Botox at 1 site/s (shoulder muscles).      3. HEAD & SCALP MUSCLES: 125 units Botox = Total Dose, 2:1 Dilution  Right Frontalis - 30 units of Botox at 4 site/s.  Left Frontalis - 25 units of Botox at 4 site/s.      Right Temporalis - 25 units of Botox at 6 site/s.  Left Temporalis - 25 units of Botox at 6 site/s.      Right  - 5 units of Botox at 1 site/s.              Left  - 5 units of Botox at 1 site/s.      Procerus - 10 units of Botox at 1 site/s.    RESPONSE TO PROCEDURE:  Jose Eduardo Ryan tolerated the procedure well and there were no immediate complications.   He was allowed to recover for an appropriate period of time and was discharged home in stable condition.    FOLLOW UP:  Jose Eduardo Ryan was asked to follow up by phone in 7-14 days with Sharon Ding PT, Care Coordinator or Iwona Chris RN, Care Coordinator, to report his response to this series of injections.  Based on the patient's previous response to this therapy, Jose Eduardo Ryan was rescheduled for the next series of injections in 12 weeks.    PLAN (Medication Changes, Therapy Orders, Work or Disability Issues, etc.): Patient will continue to monitor response to today's injections.    Again, thank you for allowing me to participate in the care of your patient.      Sincerely,    Errol Coy MD

## 2018-04-02 ENCOUNTER — OFFICE VISIT (OUTPATIENT)
Dept: PHYSICAL MEDICINE AND REHAB | Facility: CLINIC | Age: 23
End: 2018-04-02
Payer: COMMERCIAL

## 2018-04-02 VITALS
DIASTOLIC BLOOD PRESSURE: 80 MMHG | WEIGHT: 213 LBS | HEART RATE: 75 BPM | BODY MASS INDEX: 29.82 KG/M2 | SYSTOLIC BLOOD PRESSURE: 142 MMHG | HEIGHT: 71 IN

## 2018-04-02 DIAGNOSIS — G43.719 INTRACTABLE CHRONIC MIGRAINE WITHOUT AURA AND WITHOUT STATUS MIGRAINOSUS: Primary | ICD-10-CM

## 2018-04-02 ASSESSMENT — PAIN SCALES - GENERAL: PAINLEVEL: NO PAIN (0)

## 2018-04-02 NOTE — PROGRESS NOTES
"BOTULINUM TOXIN PROCEDURE - HEADACHE - NOTE    Chief Complaint   Patient presents with     Headache     UMP- Botox injection Chronic Migraine Headache       /80 (BP Location: Right arm, Patient Position: Chair, Cuff Size: Adult Regular)  Pulse 75  Ht 1.803 m (5' 11\")  Wt 96.6 kg (213 lb)  BMI 29.71 kg/m2       Current Outpatient Prescriptions:      SUMAtriptan (IMITREX) 100 MG tablet, Take 1 tablet by mouth as needed, Disp: , Rfl: 2     OnabotulinumtoxinA (BOTOX IJ), Inject 150 Units into the muscle Lot # /C3  Exp: 7/2020, Disp: , Rfl:      SUMAtriptan (IMITREX STATDOSE) 6 MG/0.5ML pen injector kit, Inject 6 mg may repeat after 2 hours if needed; MAX 6 mg/dose and 12 mg/24 hours. Limit use to no more than two days per week., Disp: 1 kit, Rfl: 11     OnabotulinumtoxinA (BOTOX IJ), Inject 150 Units as directed once Lot /C3 Exp 04/2020, Disp: , Rfl:      naproxen (NAPROXEN) 500 MG TBEC, Take 500 mg by mouth every 12 hours as needed, Disp: 60 tablet, Rfl: 3     promethazine (PHENERGAN) 25 MG Suppository, Place 1 suppository (25 mg) rectally every 6 hours as needed for nausea, Disp: 30 suppository, Rfl: 6     No Known Allergies     PHYSICAL EXAM:    Denies headache today.    HPI:    Patient reports the following new medical problems since last visit: Flu, resolved    We reviewed the recommended safety guidelines for  Botox from any vaccine injection, such as the seasonal flu vaccine, by a minimum of 10-14 days with Jose Eduardo Ryan. He acknowledged understanding.    RESPONSE TO PREVIOUS TREATMENT:  Change in headache pattern following last series of injections with 150 units of  Botox on 12/28/2017.    Post-procedural headache: Rated as 'Mild' severity.  Duration: couple of hours resolved    1.  Headache frequency during this injection cycle:  2 headache days per month.  This is compared to his baseline headache frequency of daily headache days per month.     2.  Headache duration during this " injection cycle:  Headache duration ranged from 1 hour to 12 hour. Patient reports 0 episodes of multiple day headaches during this injection cycle.     3.  Headache intensity during this injection cycle:    A.  6/10  =  Typical pain level.  B.  9/10  =  Worst pain level.  C.  0/10  =  Lowest pain level.    4.  Change in headache medication usage during this injection cycle:  (For Example:  Able to decrease use of oral pain medications.) Able to use much less pain medication    5.  ER Visits During This Injection Cycle:  0    6.  Functional Performance:  Change in ADL's, social interaction, days lost from work, etc. Patient reports 1/2 days lost from work due to headache during this injection cycle      BOTULINUM NEUROTOXIN INJECTION PROCEDURES:      VERIFICATION OF PATIENT IDENTIFICATION AND PROCEDURE     Initials   Patient Name lmd   Patient  lmd   Procedure Verified by: nicolas     Prior to the start of the procedure and with procedural staff participation, I verbally confirmed the patient s identity using two indicators, relevant allergies, that the procedure was appropriate and matched the consent or emergent situation, and that the correct equipment/implants were available. Immediately prior to starting the procedure I conducted the Time Out with the procedural staff and re-confirmed the patient s name, procedure, and site/side. (The Joint Commission universal protocol was followed.)  Yes    Sedation (Moderate or Deep): None    Above assessments performed by:  Sharon Ding PT Care Coordinator    Errol Coy MD      INDICATIONS FOR PROCEDURES:  Jose Eduardo Ryan is a 23-year-old male patient with chronic migraine headaches associated with cervicogenic components.  Jose Eduardo reports overall improvement in his migraine headache pattern, with fewer, less intense headaches since starting Botox.      His baseline symptoms have been recalcitrant to oral medications and conservative therapy. He is here today for  reinjection with Botox.      GOAL OF PROCEDURE:  The goal of this procedure is to decrease pain .    TOTAL DOSE ADMINISTERED:  Dose Administered:  150 units  Botox (Botulinum Toxin Type A)       2:1 Dilution   Diluent Used:  0.50% Sensorcaine  Total Volume of Diluent Used:  3.0 ml  Lot # /C3 with Expiration Date:  10/2020  NDC #: Botox 100u ([address]-01)    Medication guide was offered to patient and was declined.    CONSENT:  The risks, benefits, and treatment options were discussed with Jose Eduardo Ryan and he agreed to proceed.    Written consent was obtained by lmd.     EQUIPMENT USED:  Needle-37mm stimulating/recording  Needle-30 gauge  EMG/NCS Machine      SKIN PREPARATION:  Skin preparation was performed using an alcohol wipe.      GUIDANCE DESCRIPTION:  Electro-myographic guidance was necessary throughout the cervical portion of the procedure to accurately identify all areas of spastic muscles while avoiding injection of non-spastic muscles, neighboring nerves and nearby vascular structures.      AREA/MUSCLE INJECTED:  150 Units  1 & 2. SHOULDER GIRDLE & NECK MUSCLES: 25 units Botox = Total Dose, 2:1 Dilution   Right Middle Trapezius (mid cervical) - 2.5 units of Botox at 1 site/s.   Left Middle Trapezius (mid cervical) - 2.5 units of Botox at 1 site/s.       Right Splenius - 5 units of Botox at 1 site/s.   Left Splenius - 5 units of Botox at 1 site/s.       Right Levator Scapulae - 5 units of Botox at 1 site/s (shoulder muscles).   Left Levator Scapulae - 5 units of Botox at 1 site/s (shoulder muscles).      3. HEAD & SCALP MUSCLES: 125 units Botox = Total Dose, 2:1 Dilution  Right Frontalis - 30 units of Botox at 4 site/s.  Left Frontalis - 25 units of Botox at 4 site/s.      Right Temporalis - 25 units of Botox at 6 site/s.  Left Temporalis - 25 units of Botox at 6 site/s.      Right  - 5 units of Botox at 1 site/s.              Left  - 5 units of Botox at 1 site/s.      Procerus  - 10 units of Botox at 1 site/s.    RESPONSE TO PROCEDURE:  Jose Eduardo Ryan tolerated the procedure well and there were no immediate complications.   He was allowed to recover for an appropriate period of time and was discharged home in stable condition.    FOLLOW UP:  Jose Eduardo Ryan was asked to follow up by phone in 7-14 days with Sharon Ding PT, Care Coordinator or Iwona Chris RN, Care Coordinator, to report his response to this series of injections.  Based on the patient's previous response to this therapy, Jose Eduardo Ryan was rescheduled for the next series of injections in 12 weeks.    PLAN (Medication Changes, Therapy Orders, Work or Disability Issues, etc.): Patient will continue to monitor response to today's injections.

## 2018-04-02 NOTE — LETTER
"4/2/2018       RE: Jose Eduardo Ryan  [address] [place] [address]     Dear Colleague,    Thank you for referring your patient, Jose Eduardo Ryan, to the [facility] PH[facility]INE AND REHABILITATION at [facility]. Please see a copy of my visit note below.    BOTULINUM TOXIN PROCEDURE - HEADACHE - NOTE    Chief Complaint   Patient presents with     Headache     UMP- Botox injection Chronic Migraine Headache       /80 (BP Location: Right arm, Patient Position: Chair, Cuff Size: Adult Regular)  Pulse 75  Ht 1.803 m (5' 11\")  Wt 96.6 kg (213 lb)  BMI 29.71 kg/m2       Current Outpatient Prescriptions:      SUMAtriptan (IMITREX) 100 MG tablet, Take 1 tablet by mouth as needed, Disp: , Rfl: 2     OnabotulinumtoxinA (BOTOX IJ), Inject 150 Units into the muscle Lot # /C3  Exp: 7/2020, Disp: , Rfl:      SUMAtriptan (IMITREX STATDOSE) 6 MG/0.5ML pen injector kit, Inject 6 mg may repeat after 2 hours if needed; MAX 6 mg/dose and 12 mg/24 hours. Limit use to no more than two days per week., Disp: 1 kit, Rfl: 11     OnabotulinumtoxinA (BOTOX IJ), Inject 150 Units as directed once Lot /C3 Exp 04/2020, Disp: , Rfl:      naproxen (NAPROXEN) 500 MG TBEC, Take 500 mg by mouth every 12 hours as needed, Disp: 60 tablet, Rfl: 3     promethazine (PHENERGAN) 25 MG Suppository, Place 1 suppository (25 mg) rectally every 6 hours as needed for nausea, Disp: 30 suppository, Rfl: 6     No Known Allergies     PHYSICAL EXAM:    Denies headache today.    HPI:    Patient reports the following new medical problems since last visit: Flu, resolved    We reviewed the recommended safety guidelines for  Botox from any vaccine injection, such as the seasonal flu vaccine, by a minimum of 10-14 days with Jose Eduardo Ryan. He acknowledged understanding.    RESPONSE TO PREVIOUS TREATMENT:  Change in headache pattern following last series of injections with 150 units of  Botox on " 2017.    Post-procedural headache: Rated as 'Mild' severity.  Duration: couple of hours resolved    1.  Headache frequency during this injection cycle:  2 headache days per month.  This is compared to his baseline headache frequency of daily headache days per month.     2.  Headache duration during this injection cycle:  Headache duration ranged from 1 hour to 12 hour. Patient reports 0 episodes of multiple day headaches during this injection cycle.     3.  Headache intensity during this injection cycle:    A.  6/10  =  Typical pain level.  B.  9/10  =  Worst pain level.  C.  0/10  =  Lowest pain level.    4.  Change in headache medication usage during this injection cycle:  (For Example:  Able to decrease use of oral pain medications.) Able to use much less pain medication    5.  ER Visits During This Injection Cycle:  0    6.  Functional Performance:  Change in ADL's, social interaction, days lost from work, etc. Patient reports 1/2 days lost from work due to headache during this injection cycle      BOTULINUM NEUROTOXIN INJECTION PROCEDURES:      VERIFICATION OF PATIENT IDENTIFICATION AND PROCEDURE     Initials   Patient Name lmd   Patient  lmd   Procedure Verified by: nicolas     Prior to the start of the procedure and with procedural staff participation, I verbally confirmed the patient s identity using two indicators, relevant allergies, that the procedure was appropriate and matched the consent or emergent situation, and that the correct equipment/implants were available. Immediately prior to starting the procedure I conducted the Time Out with the procedural staff and re-confirmed the patient s name, procedure, and site/side. (The Joint Commission universal protocol was followed.)  Yes    Sedation (Moderate or Deep): None    Above assessments performed by:  Sharon Ding PT Care Coordinator    Errol Coy MD      INDICATIONS FOR PROCEDURES:  Jose Eduardo Ryan is a 23-year-old male patient with chronic  migraine headaches associated with cervicogenic components.  Jose Eduardo reports overall improvement in his migraine headache pattern, with fewer, less intense headaches since starting Botox.      His baseline symptoms have been recalcitrant to oral medications and conservative therapy. He is here today for reinjection with Botox.      GOAL OF PROCEDURE:  The goal of this procedure is to decrease pain .    TOTAL DOSE ADMINISTERED:  Dose Administered:  150 units  Botox (Botulinum Toxin Type A)       2:1 Dilution   Diluent Used:  0.50% Sensorcaine  Total Volume of Diluent Used:  3.0 ml  Lot # /C3 with Expiration Date:  10/2020  NDC #: Botox 100u ([address]-77)    Medication guide was offered to patient and was declined.    CONSENT:  The risks, benefits, and treatment options were discussed with Jose Eduardo Ryan and he agreed to proceed.    Written consent was obtained by lmd.     EQUIPMENT USED:  Needle-37mm stimulating/recording  Needle-30 gauge  EMG/NCS Machine      SKIN PREPARATION:  Skin preparation was performed using an alcohol wipe.      GUIDANCE DESCRIPTION:  Electro-myographic guidance was necessary throughout the cervical portion of the procedure to accurately identify all areas of spastic muscles while avoiding injection of non-spastic muscles, neighboring nerves and nearby vascular structures.      AREA/MUSCLE INJECTED:  150 Units  1 & 2. SHOULDER GIRDLE & NECK MUSCLES: 25 units Botox = Total Dose, 2:1 Dilution   Right Middle Trapezius (mid cervical) - 2.5 units of Botox at 1 site/s.   Left Middle Trapezius (mid cervical) - 2.5 units of Botox at 1 site/s.       Right Splenius - 5 units of Botox at 1 site/s.   Left Splenius - 5 units of Botox at 1 site/s.       Right Levator Scapulae - 5 units of Botox at 1 site/s (shoulder muscles).   Left Levator Scapulae - 5 units of Botox at 1 site/s (shoulder muscles).      3. HEAD & SCALP MUSCLES: 125 units Botox = Total Dose, 2:1 Dilution  Right Frontalis - 30 units  of Botox at 4 site/s.  Left Frontalis - 25 units of Botox at 4 site/s.      Right Temporalis - 25 units of Botox at 6 site/s.  Left Temporalis - 25 units of Botox at 6 site/s.      Right  - 5 units of Botox at 1 site/s.              Left  - 5 units of Botox at 1 site/s.      Procerus - 10 units of Botox at 1 site/s.    RESPONSE TO PROCEDURE:  Jose Eduardo Ryan tolerated the procedure well and there were no immediate complications.   He was allowed to recover for an appropriate period of time and was discharged home in stable condition.    FOLLOW UP:  Jose Eduardo Ryan was asked to follow up by phone in 7-14 days with Sharon Ding PT, Care Coordinator or Iwona Chris RN, Care Coordinator, to report his response to this series of injections.  Based on the patient's previous response to this therapy, Jose Eduardo Ryan was rescheduled for the next series of injections in 12 weeks.    PLAN (Medication Changes, Therapy Orders, Work or Disability Issues, etc.): Patient will continue to monitor response to today's injections.    Again, thank you for allowing me to participate in the care of your patient.      Sincerely,    Errol Coy MD

## 2018-04-02 NOTE — MR AVS SNAPSHOT
After Visit Summary   2018    Jose Eduardo Ryan    MRN: [id number]           Patient Information     Date Of Birth          1995        Visit Information        Provider Department      2018 10:00 AM Errol Coy MD [facility]        Today's Diagnoses     Intractable chronic migraine without aura and without status migrainosus    -  1       Follow-ups after your visit        Your next 10 appointments already scheduled     2018  3:50 PM CDT   (Arrive by 3:35 PM)   Return Botox with Candida Raymond MD   [facility] ([facility])    [address] [address] [phone]              Who to contact     Please call your clinic at [phone] to:    Ask questions about your health    Make or cancel appointments    Discuss your medicines    Learn about your test results    Speak to your doctor            Additional Information About Your Visit        MyChart Information     [employer] is an electronic gateway that provides easy, online access to your medical records. With [employer], you can request a clinic appointment, read your test results, renew a prescription or communicate with your care team.     To sign up for [employer] visit the website at [email or web address]   You will be asked to enter the access code listed below, as well as some personal information. Please follow the directions to create your username and password.     Your access code is: [email or web address]  Expires: 2018  4:21 PM     Your access code will  in 90 days. If you need help or a new code, please contact your [facility] or call [phone] for assistance.        Care EveryWhere ID     This is your Care EveryWhere ID. This could be used by other organizations to access your [place] medical records  [id number]        Your Vitals Were   "   Pulse Height BMI (Body Mass Index)             75 1.803 m (5' 11\") 29.71 kg/m2          Blood Pressure from Last 3 Encounters:   04/02/18 142/80   12/28/17 135/79   10/05/17 137/80    Weight from Last 3 Encounters:   04/02/18 96.6 kg (213 lb)   12/28/17 96.8 kg (213 lb 8 oz)   10/05/17 92.5 kg (204 lb)              We Performed the Following     HC CHEMODENERVATE FACIAL,TRIGERM,NERV MIGRAINE     Needle EMG Guide w/Chemodenervation (71166)        Primary Care Provider    [facility]       No address on file        Equal Access to Services     KIP CLEMENTE : Hadii lisa Frye, leonie juarez, jennifer soloriomabetty smith, deborah be. So [facility] [phone].    ATENCIÓN: Si habla español, tiene a lovett disposición servicios gratuitos de asistencia lingüística. Llame al [phone].    We comply with applicable federal civil rights laws and [place] laws. We do not discriminate on the basis of race, color, national origin, age, disability, sex, sexual orientation, or gender identity.            Thank you!     Thank you for choosing [facility] PHYSICAL MEDICINE AND REHABILITATION  for your care. Our goal is always to provide you with excellent care. Hearing back from our patients is one way we can continue to improve our services. Please take a few minutes to complete the written survey that you may receive in the mail after your visit with us. Thank you!             Your Updated Medication List - Protect others around you: Learn how to safely use, store and throw away your medicines at www.disposemymeds.org.          This list is accurate as of 4/2/18  4:21 PM.  Always use your most recent med list.                   Brand Name Dispense Instructions for use Diagnosis    * BOTOX IJ      Inject 150 Units as directed once Lot /C3 Exp 04/2020        * BOTOX IJ      Inject 150 Units into the muscle Lot # /C3  Exp: 7/2020        * BOTOX IJ      Inject 150 " Units into the muscle Lot # /C3  Exp: 10/2020        naproxen 500 MG Tbec    naproxen    60 tablet    Take 500 mg by mouth every 12 hours as needed    Migraine with aura and with status migrainosus, not intractable       promethazine 25 MG Suppository    PHENERGAN    30 suppository    Place 1 suppository (25 mg) rectally every 6 hours as needed for nausea    Intractable migraine with aura with status migrainosus       * SUMAtriptan 100 MG tablet    IMITREX     Take 1 tablet by mouth as needed        * SUMAtriptan 6 MG/0.5ML pen injector kit    IMITREX STATDOSE    1 kit    Inject 6 mg may repeat after 2 hours if needed; MAX 6 mg/dose and 12 mg/24 hours. Limit use to no more than two days per week.    Intractable migraine with aura with status migrainosus       * Notice:  This list has 5 medication(s) that are the same as other medications prescribed for you. Read the directions carefully, and ask your doctor or other care provider to review them with you.

## 2018-05-03 ENCOUNTER — TELEPHONE (OUTPATIENT)
Dept: NEUROLOGY | Facility: CLINIC | Age: 23
End: 2018-05-03

## 2018-05-03 ENCOUNTER — CARE COORDINATION (OUTPATIENT)
Dept: NEUROLOGY | Facility: CLINIC | Age: 23
End: 2018-05-03

## 2018-05-03 DIAGNOSIS — G43.111 INTRACTABLE MIGRAINE WITH AURA WITH STATUS MIGRAINOSUS: ICD-10-CM

## 2018-05-03 RX ORDER — CEFUROXIME AXETIL 250 MG/1
TABLET ORAL
Refills: 0 | Status: CANCELLED | OUTPATIENT
Start: 2018-05-03

## 2018-05-03 NOTE — TELEPHONE ENCOUNTER
Health Call Center    Phone Message    May a detailed message be left on voicemail: yes    Reason for Call: Medication Refill Request    Has the patient contacted the pharmacy for the refill? Yes   Name of medication being requested: SUMAtriptan (IMITREX STATDOSE) 6 MG/0.5ML pen injector kit  Provider who prescribed the medication: Tanna Menendez NP  Pharmacy: NEW PHARMACY: [facility], [address] [place] [id number], Ph # [phone]  Date medication is needed: asap - Pt is out of refills on this Rx and changed Pharmacies to different [facility] location  PATIENT ALSO REQUESTING A SECOND NEW MEDICATION: Pt requesting an Rx for Prednisone. Said he has had Migraines six days straight now, and this mediation has helped break up the cluster headaches in the past. Pt requesting New Rx for Prednisone sent to New Pharmacy asap as he is in a lot of pain. Please send both Rx's to New Pharmacy, and call Pt if needed. Thanks!        Action Taken: Message routed to:  [facility] ([facility] Neurology

## 2018-05-03 NOTE — TELEPHONE ENCOUNTER
Called patient regarding refills. Tanna has not seen patient since Oct 2017. She would like to see him before giving refills/prescribing new meds. Patient scheduled to see Tanna tomorrow at 11am. He verbalized understanding and agreement with plan.

## 2018-05-03 NOTE — PROGRESS NOTES
Tanna Menendez heard from someone that patient tried calling us but nobody returned his call. I do not see any recent incoming calls from him. Tried calling him today but he did not answer. Left voicemail message with our contact info and asked him to let us know if he has any questions or concerns. Last seen in clinic Oct 2017.

## 2018-05-04 ENCOUNTER — OFFICE VISIT (OUTPATIENT)
Dept: NEUROLOGY | Facility: CLINIC | Age: 23
End: 2018-05-04
Payer: COMMERCIAL

## 2018-05-04 VITALS
HEIGHT: 71 IN | BODY MASS INDEX: 28.28 KG/M2 | WEIGHT: 202 LBS | SYSTOLIC BLOOD PRESSURE: 144 MMHG | DIASTOLIC BLOOD PRESSURE: 72 MMHG | HEART RATE: 76 BPM

## 2018-05-04 DIAGNOSIS — G43.111 INTRACTABLE MIGRAINE WITH AURA WITH STATUS MIGRAINOSUS: ICD-10-CM

## 2018-05-04 RX ORDER — NAPROXEN 500 MG/1
TABLET ORAL
Qty: 30 TABLET | Refills: 3 | Status: SHIPPED | OUTPATIENT
Start: 2018-05-04 | End: 2024-02-20

## 2018-05-04 RX ORDER — CEFUROXIME AXETIL 250 MG/1
TABLET ORAL
Qty: 1 KIT | Refills: 11 | Status: SHIPPED | OUTPATIENT
Start: 2018-05-04 | End: 2019-02-04

## 2018-05-04 RX ORDER — PROMETHAZINE HYDROCHLORIDE 25 MG/1
25 SUPPOSITORY RECTAL EVERY 6 HOURS PRN
Qty: 30 SUPPOSITORY | Refills: 6 | Status: SHIPPED | OUTPATIENT
Start: 2018-05-04 | End: 2024-02-20

## 2018-05-04 RX ORDER — METHYLPREDNISOLONE 4 MG
TABLET, DOSE PACK ORAL
Qty: 21 TABLET | Refills: 0 | Status: SHIPPED | OUTPATIENT
Start: 2018-05-04 | End: 2019-12-05

## 2018-05-04 NOTE — PATIENT INSTRUCTIONS
Plan:  Acute treatment-sumatriptan injectable as needed. May take with 1-2 tablets every 12 hours of Naproxen and with promethazine suppositories. May take with 12.5 -25 mg of Benadryl every 6 hours as needed.  Medrol pack for intractable headache lasting a few days and not relieved with your acute medications.   Continue Botox  Return if headache were persistent to discuss other preventive options  Establish [facility] so you can send messages to your care team.   Follow up in 6 months or sooner if needed        Sumatriptan injection  Brand Names: Alsuma, Imitrex, Imitrex Statdose, Sumavel DosePro System  What is this medicine?  SUMATRIPTAN (seun ma TRIP tan) is used to treat migraines with or without aura. An aura is a strange feeling or visual disturbance that warns you of an attack. It is not used to prevent migraines.  How should I use this medicine?  This medicine is for injection under the skin. Follow the directions on the prescription label. Only use this medicine at the first symptoms of a migraine. It is not for everyday use. If you are using an autoinjector, read the instruction leaflet carefully. A single injection is given just under the skin. Before you make an injection, clean and examine your skin. Do not inject at a place where the skin is damaged or infected. If your symptoms return you can use a second injection. If there is no improvement at all in your symptoms after the first injection, call your doctor or health care professional. Wait at least 1 hour between doses and do not use more than 6 mg as a single dose. Do not use more than 12 mg total in any 24 hour period. Do not use your medicine more often than directed.  Talk to your pediatrician regarding the use of this medicine in children. Special care may be needed.  What side effects may I notice from receiving this medicine?  Side effects that you should report to your doctor or health care professional as soon as possible:    allergic  reactions like skin rash, itching or hives, swelling of the face, lips, or tongue    bloody or watery diarrhea    hallucination, loss of contact with reality    pain, tingling, numbness in the face, hands, or feet    seizures    signs and symptoms of a blood clot such as breathing problems; changes in vision; chest pain; severe, sudden headache; pain, swelling, warmth in the leg; trouble speaking; sudden numbness or weakness of the face, arm, or leg    signs and symptoms of a dangerous change in heartbeat or heart rhythm like chest pain; dizziness; fast or irregular heartbeat; palpitations, feeling faint or lightheaded; falls; breathing problems    signs and symptoms of a stroke like changes in vision; confusion; trouble speaking or understanding; severe headaches; sudden numbness or weakness of the face, arm, or leg; trouble walking; dizziness; loss of balance or coordination    stomach pain  Side effects that usually do not require medical attention (report to your doctor or health care professional if they continue or are bothersome):    changes in taste    facial flushing    headache    muscle cramps    muscle pain    nausea, vomiting    pain, redness, or irritation at site where injected    weak or tired  What may interact with this medicine?  Do not take this medicine with any of the following medicines:    cocaine    ergot alkaloids like dihydroergotamine, ergonovine, ergotamine, methylergonovine    feverfew    MAOIs like Carbex, Eldepryl, Marplan, Nardil, and Parnate    other medicines for migraine headache like almotriptan, eletriptan, frovatriptan, naratriptan, rizatriptan, zolmitriptan    tryptophan  This medicine may also interact with the following medications:    certain medicines for depression, anxiety, or psychotic disturbances  What if I miss a dose?  This does not apply; this medicine is not for regular use.  Where should I keep my medicine?  Keep out of the reach of children.  Store at room  temperature between 2 and 30 degrees C (36 and 86 degrees F). Protect from light. Throw away any unused medicine after the expiration date.  Make sure you receive a puncture-resistant container to dispose of the needles and syringes once you have finished with them. Do not reuse these items. Return the container to your health care professional for proper disposal. Keep the autoinjector device.  What should I tell my health care provider before I take this medicine?  They need to know if you have any of these conditions:    circulation problems in fingers and toes    diabetes    heart disease    high blood pressure    high cholesterol    history of irregular heartbeat    history of stroke    kidney disease    liver disease    postmenopausal or surgical removal of uterus and ovaries    seizures    smoke tobacco    stomach or intestine problems    an unusual or allergic reaction to sumatriptan, other medicines, foods, dyes, or preservatives    pregnant or trying to get pregnant    breast-feeding  What should I watch for while using this medicine?  Only take this medicine for a migraine headache. Take it if you get warning symptoms or at the start of a migraine attack. It is not for regular use to prevent migraine attacks.  You may get drowsy or dizzy. Do not drive, use machinery, or do anything that needs mental alertness until you know how this medicine affects you. Do not stand or sit up quickly, especially if you are an older patient. This reduces the risk of dizzy or fainting spells. Alcohol may interfere with the effect of this medicine. Avoid alcoholic drinks.  Smoking cigarettes may increase the risk of heart-related side effects from using this medicine.  If you take migraine medicines for 10 or more days a month, your migraines may get worse. Keep a diary of headache days and medicine use. Contact your healthcare professional if your migraine attacks occur more frequently.  NOTE:This sheet is a summary. It  may not cover all possible information. If you have questions about this medicine, talk to your doctor, pharmacist, or health care provider. Copyright  2018 [employer]        Methylprednisolone tablets  Brand Names: Medrol, Medrol Dosepak  What is this medicine?  METHYLPREDNISOLONE (meth ill pred NISS oh lone) is a corticosteroid. It is commonly used to treat inflammation of the skin, joints, lungs, and other organs. Common conditions treated include asthma, allergies, and arthritis. It is also used for other conditions, such as blood disorders and diseases of the adrenal glands.  How should I use this medicine?  Take this medicine by mouth with a glass of water. Follow the directions on the prescription label. Take this medicine with food. If you are taking this medicine once a day, take it in the morning. Do not take it more often than directed. Do not suddenly stop taking your medicine because you may develop a severe reaction. Your doctor will tell you how much medicine to take. If your doctor wants you to stop the medicine, the dose may be slowly lowered over time to avoid any side effects.  Talk to your pediatrician regarding the use of this medicine in children. Special care may be needed.  What side effects may I notice from receiving this medicine?  Side effects that you should report to your doctor or health care professional as soon as possible:    allergic reactions like skin rash, itching or hives, swelling of the face, lips, or tongue    bloody or tarry stools    changes in vision    hallucination, loss of contact with reality    muscle cramps    muscle pain    palpitations    signs and symptoms of high blood sugar such as dizziness; dry mouth; dry skin; fruity breath; nausea; stomach pain; increased hunger or thirst; increased urination    signs and symptoms of infection like fever or chills; cough; sore throat; pain or trouble passing urine    trouble passing urine or change in the amount of  urine  Side effects that usually do not require medical attention (report to your doctor or health care professional if they continue or are bothersome):    changes in emotions or mood    constipation    diarrhea    excessive hair growth on the face or body    headache    nausea, vomiting    trouble sleeping    weight gain  What may interact with this medicine?  Do not take this medicine with any of the following medications:    alefacept    echinacea    live virus vaccines    metyrapone    mifepristone  This medicine may also interact with the following medications:    amphotericin B    aspirin and aspirin-like medicines    certain antibiotics like erythromycin, clarithromycin, troleandomycin    certain medicines for diabetes    certain medicines for fungal infections like ketoconazole    certain medicines for seizures like carbamazepine, phenobarbital, phenytoin    certain medicines that treat or prevent blood clots like warfarin    cholestyramine    cyclosporine    digoxin    diuretics    female hormones, like estrogens and birth control pills    isoniazid    NSAIDs, medicines for pain inflammation, like ibuprofen or naproxen    other medicines for myasthenia gravis    rifampin    vaccines  What if I miss a dose?  If you miss a dose, take it as soon as you can. If it is almost time for your next dose, talk to your doctor or health care professional. You may need to miss a dose or take an extra dose. Do not take double or extra doses without advice.  Where should I keep my medicine?  Keep out of the reach of children.  Store at room temperature between 20 and 25 degrees C (68 and 77 degrees F). Throw away any unused medicine after the expiration date.  What should I tell my health care provider before I take this medicine?  They need to know if you have any of these conditions:    Cushing's syndrome    eye disease, vision problems    diabetes    glaucoma    heart disease    high blood pressure    infection  (especially a virus infection such as chickenpox, cold sores, or herpes)    liver disease    mental illness    myasthenia gravis    osteoporosis    recently received or scheduled to receive a vaccine    seizures    stomach or intestine problems    thyroid disease    an unusual or allergic reaction to lactose, methylprednisolone, other medicines, foods, dyes, or preservatives    pregnant or trying to get pregnant    breast-feeding  What should I watch for while using this medicine?  Tell your doctor or healthcare professional if your symptoms do not start to get better or if they get worse. Do not stop taking except on your doctor's advice. You may develop a severe reaction. Your doctor will tell you how much medicine to take.  This medicine may increase your risk of getting an infection. Tell your doctor or health care professional if you are around anyone with measles or chickenpox, or if you develop sores or blisters that do not heal properly.  This medicine may affect blood sugar levels. If you have diabetes, check with your doctor or health care professional before you change your diet or the dose of your diabetic medicine.  Tell your doctor or health care professional right away if you have any change in your eyesight.  Using this medicine for a long time may increase your risk of low bone mass. Talk to your doctor about bone health.  NOTE:This sheet is a summary. It may not cover all possible information. If you have questions about this medicine, talk to your doctor, pharmacist, or health care provider. Copyright  2018 [employer]

## 2018-05-04 NOTE — PROGRESS NOTES
"Re: Jose Eduardo Ryan      MRN# [id number]  YOB: 1995  Date of Visit:5/4/2018     OUTPATIENT NEUROLOGY VISIT NOTE    Reason for Visit:  Headache follow up    Interval History:  Jose Eduardo Ryan is a 23-year-old male presents to the clinic today for headache follow up.   Last Neurology visit 10/05/2017, see clinic visit note for details.   Patient reports that he has been going thru headache \"cluster\" lasting 3 hours to 9 hours. Headache may re occure next day or day after. Patient reports that in the Medrol pack would be very helpful. Patient reports that headaches more frequent in the past 2-3 weeks. Currently 4 headaches per week over half of his week. Patient reports that his Botox last time 4/2/2018.   Patient does not take any preventive medications for headache prevention but Botox. Patient was on the preventive medications in the past.   Patient reports a high stress and moving out his apartment and planning to buy his own house with his girlfriend of 4 years.   Patient reports that she has been going down on caffeine -1 cup per week and 2 bottles of pop per week  Plays soft ball three nights per week  Staying hydrated  Patient wears sun glasses  Patient's headaches possible triggered by stress and weather changes.   Acute headache -takes sumatriptan injection which only things that works and naproxen 500 mg at headache onset +promethazine 25 mg suppository  Headache prevention with Botox    Today patient would like to refill his sumatriptan injections and promethazine suppositories. Patient would like a Medrol Pack to break his headache cycle. Patient is leaving to [place] for training. Patient reports that travel triggers the headache.     Plan discussed with the patient:  Acute treatment-sumatriptan injectable as needed. May take with 1-2 tablets every 12 hours of Naproxen and with promethazine suppositories. May take with 12.5 -25 mg of Benadryl every 6 hours as needed.  Medrol pack for intractable " "headache lasting a few days and not relieved with your acute medications.   Continue Botox  Return if headache were persistent to discuss other preventive options  [facility] so you can [facility] to your care team.   Follow up in 6 months or sooner if needed      Past Medical History reviewed   Social History   Substance Use Topics     Smoking status: Current Every Day Smoker     Packs/day: 0.30     Years: 2.00     Types: Cigarettes     Smokeless tobacco: Current User     Alcohol use No      Comment: rarely    reviewed and verified with the patient   No Known Allergies    Current Outpatient Prescriptions   Medication Sig Dispense Refill     naproxen (NAPROXEN) 500 MG TBEC Take 500 mg by mouth every 12 hours as needed 60 tablet 3     OnabotulinumtoxinA (BOTOX IJ) Inject 150 Units as directed once Lot /C3  Exp 04/2020       OnabotulinumtoxinA (BOTOX IJ) Inject 150 Units into the muscle Lot # /C3  Exp: 7/2020       OnabotulinumtoxinA (BOTOX IJ) Inject 150 Units into the muscle Lot # /C3  Exp: 10/2020       promethazine (PHENERGAN) 25 MG Suppository Place 1 suppository (25 mg) rectally every 6 hours as needed for nausea 30 suppository 6     SUMAtriptan (IMITREX STATDOSE) 6 MG/0.5ML pen injector kit Inject 6 mg may repeat after 2 hours if needed; MAX 6 mg/dose and 12 mg/24 hours. Limit use to no more than two days per week. 1 kit 11     SUMAtriptan (IMITREX) 100 MG tablet Take 1 tablet by mouth as needed  2   reviewed and verified with the patient    Review of Systems:   A 10-point ROS including constitutional, eyes, respiratory, cardiovascular, gastroenterology, genitourinary, integumentary, musculoskeletal, neurology and psychiatric were all negative except as mentioned in the interval history.      General Exam:   /72  Pulse 76  Ht 1.803 m (5' 11\")  Wt 91.6 kg (202 lb)  BMI 28.17 kg/m2  GEN: Awake, NAD; good eye contact, responses appropriately, no headache today   HEENT: Head " atraumatic/Normocephalic. Scalp normal. Pupils equally round, 4 mm, reactive to light and accommodation, sclera and conjunctiva normal. Fundoscopic examination reveals normal vessels no papilledema.   Neck: Easily moveable without resistance  Heart: S1/S2 appreciated, RRR, no m/r/g, no carotid bruits  Lungs:Lungs are clear to auscultation bilaterally, no wheezes or crackles.   Neurological Examination:  The patient is alert and oriented times four. Has good attention and concentration. Speech is fluent without dysarthria. EOM intact. There is no nystagmus. Has conjugated gaze. Face is symmetrical. Intact and symmetrical eyebrow and lid raise and eyelid closure, smiles. Hearing Intact to conversation speech. The palates elevates symmetrical. The tongue protrudes midline with no atrophy or fasciculations. Strength  5/5 in the upper and lower extremities bilaterally. Sensation is intact to  touch throughout.  Reflexes symmetrical at biceps, triceps, brachioradialis, patellar, and Achilles. Negative Babinski with downgoing toes bilaterally. Coordination reveals finger-nose-finger, rapid alternating movements with normal speed and accuracy. Normal casual gait.    Assessment and Plan:  See Interval History for our discussion and treatment plan.     Prescriptions for sumatriptan and promethazine provided. Correct use and course provided. Expected benefits and typical side effects reviewed. Safety of concomitant medications and interactions reviewed. Patient taught signs and symptoms of adverse reactions and allergies. Patient understands teaching and accepts risks of prescribed medication regimen.      I discussed all my recommendation with Jose Eduardo Ryan. The patient verbalizes understanding and comfortable with the plan. The patient has our clinic phone number to call with any questions or concerns. All of the patient's questions were answered from the best of my current knowledge.   Time spent with pt answering  questions, discussing findings, counseling and coordinating care was more than 50% the appointment time, 27  minutes.         BRANDI Ma, CNP  [facility]

## 2018-05-04 NOTE — MR AVS SNAPSHOT
After Visit Summary   5/4/2018    Jose Eduardo Ryan    MRN: [id number]           Patient Information     Date Of Birth          1995        Visit Information        Provider Department      5/4/2018 11:00 AM Tanna Menendez APRN CNP [facility]        Today's Diagnoses     Intractable migraine with aura with status migrainosus          Care Instructions    Plan:  Acute treatment-sumatriptan injectable as needed. May take with 1-2 tablets every 12 hours of Naproxen and with promethazine suppositories. May take with 12.5 -25 mg of Benadryl every 6 hours as needed.  Medrol pack for intractable headache lasting a few days and not relieved with your acute medications.   Continue Botox  Return if headache were persistent to discuss other preventive options  [facility] so you can send messages to your care team.   Follow up in 6 months or sooner if needed        Sumatriptan injection  Brand Names: Alsuma, Imitrex, Imitrex Statdose, Sumavel DosePro System  What is this medicine?  SUMATRIPTAN (seun ma TRIP tan) is used to treat migraines with or without aura. An aura is a strange feeling or visual disturbance that warns you of an attack. It is not used to prevent migraines.  How should I use this medicine?  This medicine is for injection under the skin. Follow the directions on the prescription label. Only use this medicine at the first symptoms of a migraine. It is not for everyday use. If you are using an autoinjector, read the instruction leaflet carefully. A single injection is given just under the skin. Before you make an injection, clean and examine your skin. Do not inject at a place where the skin is damaged or infected. If your symptoms return you can use a second injection. If there is no improvement at all in your symptoms after the first injection, call your doctor or health care professional. Wait at least 1 hour between doses and do not use more than 6 mg as a single  dose. Do not use more than 12 mg total in any 24 hour period. Do not use your medicine more often than directed.  Talk to your pediatrician regarding the use of this medicine in children. Special care may be needed.  What side effects may I notice from receiving this medicine?  Side effects that you should report to your doctor or health care professional as soon as possible:    allergic reactions like skin rash, itching or hives, swelling of the face, lips, or tongue    bloody or watery diarrhea    hallucination, loss of contact with reality    pain, tingling, numbness in the face, hands, or feet    seizures    signs and symptoms of a blood clot such as breathing problems; changes in vision; chest pain; severe, sudden headache; pain, swelling, warmth in the leg; trouble speaking; sudden numbness or weakness of the face, arm, or leg    signs and symptoms of a dangerous change in heartbeat or heart rhythm like chest pain; dizziness; fast or irregular heartbeat; palpitations, feeling faint or lightheaded; falls; breathing problems    signs and symptoms of a stroke like changes in vision; confusion; trouble speaking or understanding; severe headaches; sudden numbness or weakness of the face, arm, or leg; trouble walking; dizziness; loss of balance or coordination    stomach pain  Side effects that usually do not require medical attention (report to your doctor or health care professional if they continue or are bothersome):    changes in taste    facial flushing    headache    muscle cramps    muscle pain    nausea, vomiting    pain, redness, or irritation at site where injected    weak or tired  What may interact with this medicine?  Do not take this medicine with any of the following medicines:    cocaine    ergot alkaloids like dihydroergotamine, ergonovine, ergotamine, methylergonovine    feverfew    MAOIs like Carbex, Eldepryl, Marplan, Nardil, and Parnate    other medicines for migraine headache like almotriptan,  eletriptan, frovatriptan, naratriptan, rizatriptan, zolmitriptan    tryptophan  This medicine may also interact with the following medications:    certain medicines for depression, anxiety, or psychotic disturbances  What if I miss a dose?  This does not apply; this medicine is not for regular use.  Where should I keep my medicine?  Keep out of the reach of children.  Store at room temperature between 2 and 30 degrees C (36 and 86 degrees F). Protect from light. Throw away any unused medicine after the expiration date.  Make sure you receive a puncture-resistant container to dispose of the needles and syringes once you have finished with them. Do not reuse these items. Return the container to your health care professional for proper disposal. Keep the autoinjector device.  What should I tell my health care provider before I take this medicine?  They need to know if you have any of these conditions:    circulation problems in fingers and toes    diabetes    heart disease    high blood pressure    high cholesterol    history of irregular heartbeat    history of stroke    kidney disease    liver disease    postmenopausal or surgical removal of uterus and ovaries    seizures    smoke tobacco    stomach or intestine problems    an unusual or allergic reaction to sumatriptan, other medicines, foods, dyes, or preservatives    pregnant or trying to get pregnant    breast-feeding  What should I watch for while using this medicine?  Only take this medicine for a migraine headache. Take it if you get warning symptoms or at the start of a migraine attack. It is not for regular use to prevent migraine attacks.  You may get drowsy or dizzy. Do not drive, use machinery, or do anything that needs mental alertness until you know how this medicine affects you. Do not stand or sit up quickly, especially if you are an older patient. This reduces the risk of dizzy or fainting spells. Alcohol may interfere with the effect of this  medicine. Avoid alcoholic drinks.  Smoking cigarettes may increase the risk of heart-related side effects from using this medicine.  If you take migraine medicines for 10 or more days a month, your migraines may get worse. Keep a diary of headache days and medicine use. Contact your healthcare professional if your migraine attacks occur more frequently.  NOTE:This sheet is a summary. It may not cover all possible information. If you have questions about this medicine, talk to your doctor, pharmacist, or health care provider. Copyright  2018 [employer]        Methylprednisolone tablets  Brand Names: Medrol, Medrol Dosepak  What is this medicine?  METHYLPREDNISOLONE (meth ill pred NISS oh lone) is a corticosteroid. It is commonly used to treat inflammation of the skin, joints, lungs, and other organs. Common conditions treated include asthma, allergies, and arthritis. It is also used for other conditions, such as blood disorders and diseases of the adrenal glands.  How should I use this medicine?  Take this medicine by mouth with a glass of water. Follow the directions on the prescription label. Take this medicine with food. If you are taking this medicine once a day, take it in the morning. Do not take it more often than directed. Do not suddenly stop taking your medicine because you may develop a severe reaction. Your doctor will tell you how much medicine to take. If your doctor wants you to stop the medicine, the dose may be slowly lowered over time to avoid any side effects.  Talk to your pediatrician regarding the use of this medicine in children. Special care may be needed.  What side effects may I notice from receiving this medicine?  Side effects that you should report to your doctor or health care professional as soon as possible:    allergic reactions like skin rash, itching or hives, swelling of the face, lips, or tongue    bloody or tarry stools    changes in vision    hallucination, loss of contact with  reality    muscle cramps    muscle pain    palpitations    signs and symptoms of high blood sugar such as dizziness; dry mouth; dry skin; fruity breath; nausea; stomach pain; increased hunger or thirst; increased urination    signs and symptoms of infection like fever or chills; cough; sore throat; pain or trouble passing urine    trouble passing urine or change in the amount of urine  Side effects that usually do not require medical attention (report to your doctor or health care professional if they continue or are bothersome):    changes in emotions or mood    constipation    diarrhea    excessive hair growth on the face or body    headache    nausea, vomiting    trouble sleeping    weight gain  What may interact with this medicine?  Do not take this medicine with any of the following medications:    alefacept    echinacea    live virus vaccines    metyrapone    mifepristone  This medicine may also interact with the following medications:    amphotericin B    aspirin and aspirin-like medicines    certain antibiotics like erythromycin, clarithromycin, troleandomycin    certain medicines for diabetes    certain medicines for fungal infections like ketoconazole    certain medicines for seizures like carbamazepine, phenobarbital, phenytoin    certain medicines that treat or prevent blood clots like warfarin    cholestyramine    cyclosporine    digoxin    diuretics    female hormones, like estrogens and birth control pills    isoniazid    NSAIDs, medicines for pain inflammation, like ibuprofen or naproxen    other medicines for myasthenia gravis    rifampin    vaccines  What if I miss a dose?  If you miss a dose, take it as soon as you can. If it is almost time for your next dose, talk to your doctor or health care professional. You may need to miss a dose or take an extra dose. Do not take double or extra doses without advice.  Where should I keep my medicine?  Keep out of the reach of children.  Store at room  temperature between 20 and 25 degrees C (68 and 77 degrees F). Throw away any unused medicine after the expiration date.  What should I tell my health care provider before I take this medicine?  They need to know if you have any of these conditions:    Cushing's syndrome    eye disease, vision problems    diabetes    glaucoma    heart disease    high blood pressure    infection (especially a virus infection such as chickenpox, cold sores, or herpes)    liver disease    mental illness    myasthenia gravis    osteoporosis    recently received or scheduled to receive a vaccine    seizures    stomach or intestine problems    thyroid disease    an unusual or allergic reaction to lactose, methylprednisolone, other medicines, foods, dyes, or preservatives    pregnant or trying to get pregnant    breast-feeding  What should I watch for while using this medicine?  Tell your doctor or healthcare professional if your symptoms do not start to get better or if they get worse. Do not stop taking except on your doctor's advice. You may develop a severe reaction. Your doctor will tell you how much medicine to take.  This medicine may increase your risk of getting an infection. Tell your doctor or health care professional if you are around anyone with measles or chickenpox, or if you develop sores or blisters that do not heal properly.  This medicine may affect blood sugar levels. If you have diabetes, check with your doctor or health care professional before you change your diet or the dose of your diabetic medicine.  Tell your doctor or health care professional right away if you have any change in your eyesight.  Using this medicine for a long time may increase your risk of low bone mass. Talk to your doctor about bone health.  NOTE:This sheet is a summary. It may not cover all possible information. If you have questions about this medicine, talk to your doctor, pharmacist, or health care provider. Copyright  2018  [employer]                Follow-ups after your visit        Follow-up notes from your care team     Return in about 6 months (around 2018).      Your next 10 appointments already scheduled     2018  3:50 PM CDT   (Arrive by 3:35 PM)   Return Botox with Candida Raymond MD   [facility] ([facility])    [address] [place] [id number] [phone]            2018 12:30 PM CST   (Arrive by 12:15 PM)   Return Visit with BRANDI Partida [facility] ([facility])    [address] [place] [id number] [phone]              Who to contact     Please call your clinic at [phone] to:    Ask questions about your health    Make or cancel appointments    Discuss your medicines    Learn about your test results    Speak to your doctor            Additional Information About Your Visit        [employer] Information     [employer] is an electronic gateway that provides easy, online access to your medical records. With [employer], you can request a clinic appointment, read your test results, renew a prescription or communicate with your care team.     To sign up for [employer] visit the website at [email or web address]   You will be asked to enter the access code listed below, as well as some personal information. Please follow the directions to create your username and password.     Your access code is: [email or web address]  Expires: 2018  4:21 PM     Your access code will  in 90 days. If you need help or a new code, please contact your [facility] or call [phone] for assistance.        Care EveryWhere ID     This is your Care EveryWhere ID. This could be used by other organizations to access your [place] medical records  [id number]        Your Vitals Were     Pulse Height BMI (Body Mass Index)     "         76 1.803 m (5' 11\") 28.17 kg/m2          Blood Pressure from Last 3 Encounters:   05/04/18 144/72   04/02/18 142/80   12/28/17 135/79    Weight from Last 3 Encounters:   05/04/18 91.6 kg (202 lb)   04/02/18 96.6 kg (213 lb)   12/28/17 96.8 kg (213 lb 8 oz)              Today, you had the following     No orders found for display         Today's Medication Changes          These changes are accurate as of 5/4/18 11:59 PM.  If you have any questions, ask your nurse or doctor.               Start taking these medicines.        Dose/Directions    methylPREDNISolone 4 MG tablet   Commonly known as:  MEDROL DOSEPAK   Used for:  Intractable migraine with aura with status migrainosus   Started by:  Tanna Menendez APRN CNP        Follow package instructions   Quantity:  21 tablet   Refills:  0         These medicines have changed or have updated prescriptions.        Dose/Directions    naproxen 500 MG Tbec   Commonly known as:  naproxen   This may have changed:    - how much to take  - how to take this  - when to take this  - reasons to take this  - additional instructions   Changed by:  Tanna Menendez APRN CNP        May take 1-2 tabs every 12 hours as needed. Limit use to no more than 2 days per week   Quantity:  30 tablet   Refills:  3       * SUMAtriptan 100 MG tablet   Commonly known as:  IMITREX   This may have changed:  Another medication with the same name was added. Make sure you understand how and when to take each.   Changed by:  Tanna Menendez APRN CNP        Dose:  1 tablet   Take 1 tablet by mouth as needed   Refills:  2       * SUMAtriptan 6 MG/0.5ML pen injector kit   Commonly known as:  IMITREX STATDOSE   This may have changed:  Another medication with the same name was added. Make sure you understand how and when to take each.   Used for:  Intractable migraine with aura with status migrainosus   Changed by:  Tanna Menendez APRN CNP "        Inject 6 mg may repeat after 2 hours if needed; MAX 6 mg/dose and 12 mg/24 hours. Limit use to no more than two days per week.   Quantity:  1 kit   Refills:  11       * SUMAtriptan 6 MG/0.5ML refill cartridge   Commonly known as:  IMITREX STATDOSE   This may have changed:  You were already taking a medication with the same name, and this prescription was added. Make sure you understand how and when to take each.   Used for:  Intractable migraine with aura with status migrainosus   Changed by:  Tanna Menendez APRN CNP        Dose:  6 mg   Inject 0.5 mLs (6 mg) Subcutaneous at onset of headache for migraine   Quantity:  2 Cartridge   Refills:  11       * Notice:  This list has 3 medication(s) that are the same as other medications prescribed for you. Read the directions carefully, and ask your doctor or other care provider to review them with you.         Where to get your medicines      These medications were sent to [facility] [address] [facility] [address] [facility] [address] [place] [address] & [facility]  [address] [facility] [address]     Phone:  [phone]     methylPREDNISolone 4 MG tablet    naproxen 500 MG Tbec    promethazine 25 MG Suppository    SUMAtriptan 6 MG/0.5ML pen injector kit    SUMAtriptan 6 MG/0.5ML refill cartridge                Primary Care Provider    [facility]       No address on file        Equal Access to Services     KIP CLEMENTE AH: Hadii lisa centenoo Soroberto, waaxda luqadaha, qaybta kaalmada adebertayada, deborah hayden . So [facility] [phone].    ATENCIÓN: Si habla español, tiene a lovett disposición servicios gratuitos de asistencia lingüística. Llame al [phone].    We comply with applicable federal civil rights laws and [place] laws. We do not discriminate on the basis of race, color, national origin, age, disability, sex, sexual orientation, or gender identity.            Thank you!      Thank you for choosing [facility]  for your care. Our goal is always to provide you with excellent care. Hearing back from our patients is one way we can continue to improve our services. Please take a few minutes to complete the written survey that you may receive in the mail after your visit with us. Thank you!             Your Updated Medication List - Protect others around you: Learn how to safely use, store and throw away your medicines at www.disposemymeds.org.          This list is accurate as of 5/4/18 11:59 PM.  Always use your most recent med list.                   Brand Name Dispense Instructions for use Diagnosis    * BOTOX IJ      Inject 150 Units as directed once Lot /C3 Exp 04/2020        * BOTOX IJ      Inject 150 Units into the muscle Lot # /[id number]  Exp: 7/2020        * BOTOX IJ      Inject 150 Units into the muscle Lot # /C3  Exp: 10/2020        methylPREDNISolone 4 MG tablet    MEDROL DOSEPAK    21 tablet    Follow package instructions    Intractable migraine with aura with status migrainosus       naproxen 500 MG Tbec    naproxen    30 tablet    May take 1-2 tabs every 12 hours as needed. Limit use to no more than 2 days per week        promethazine 25 MG Suppository    PHENERGAN    30 suppository    Place 1 suppository (25 mg) rectally every 6 hours as needed for nausea    Intractable migraine with aura with status migrainosus       * SUMAtriptan 100 MG tablet    IMITREX     Take 1 tablet by mouth as needed        * SUMAtriptan 6 MG/0.5ML pen injector kit    IMITREX STATDOSE    1 kit    Inject 6 mg may repeat after 2 hours if needed; MAX 6 mg/dose and 12 mg/24 hours. Limit use to no more than two days per week.    Intractable migraine with aura with status migrainosus       * SUMAtriptan 6 MG/0.5ML refill cartridge    IMITREX STATDOSE    2 Cartridge    Inject 0.5 mLs (6 mg) Subcutaneous at onset of headache for migraine    Intractable migraine with aura with status  migrainosus       * Notice:  This list has 6 medication(s) that are the same as other medications prescribed for you. Read the directions carefully, and ask your doctor or other care provider to review them with you.

## 2018-11-05 ENCOUNTER — OFFICE VISIT (OUTPATIENT)
Dept: NEUROLOGY | Facility: CLINIC | Age: 23
End: 2018-11-05
Payer: COMMERCIAL

## 2018-11-05 VITALS — HEART RATE: 93 BPM | OXYGEN SATURATION: 96 % | DIASTOLIC BLOOD PRESSURE: 84 MMHG | SYSTOLIC BLOOD PRESSURE: 137 MMHG

## 2018-11-05 DIAGNOSIS — G43.111 INTRACTABLE MIGRAINE WITH AURA WITH STATUS MIGRAINOSUS: ICD-10-CM

## 2018-11-05 ASSESSMENT — PAIN SCALES - GENERAL: PAINLEVEL: NO PAIN (0)

## 2018-11-05 NOTE — NURSING NOTE
Chief Complaint   Patient presents with     RECHECK     [facility] RETURN 6 MO F/U       Lydia Burrows, EMT

## 2018-11-05 NOTE — PATIENT INSTRUCTIONS
Plan:  Headache log for frequency and severity  Stay hydrated and sleep well, reduce stress level.   Call our Clinic with headache worsening at [phone]  Follow up in 6-12 months or sooner if needed

## 2018-11-05 NOTE — PROGRESS NOTES
Re: Jose Eduardo Ryan      MRN# [id number]  YOB: 1995  Date of Visit:11/5/2018     OUTPATIENT NEUROLOGY VISIT NOTE    Reason for Visit:  Headache follow up    Interval History:  Jose Eduardo Ryan is a 23-year-old male presents to the clinic today for headache and med refills/follow up.   Last [facility] visit for headache follow up on 10/05/2017, see last neurology visit note for details. History of headaches for many years and improvement in headache frequency and severity with Botox treatment.   Today patient reports that he had about 12-15 headache days in 6 months. Reports that about once per month he stresses himself out and gets headache. Duration from one to a day. Patient reports that he stopped Botox in April (last injection on 4/2/2018) to see if it make any difference and to take a break. Reports that he did not experience any headache increase since stopping Botox. Reports that he feels better after stopping botox. Last Medrol pack for intractable migraine in May of 2018 and none since then.   Patient reports that sumatriptan injection works well with headache. Patient reports that he takes naproxen with his injections only. Reports that promethazine which helps as needed with vomiting.   No new symptoms reported today.      Denies history of head or neck trauma, dizziness, vertigo, loss of consciousness, seizure, double vision, blurred vision, hearing difficulty, speech or swallowing difficulty, weakness or numbness in face, arms or legs, urinary or bowel incontinence, coordination problems or gait difficulty, fever or chills.    Discussed new FDA meds and demonstrated each of them to the patient simply to know of new treatment existence.      Plan discussed with the patient:  Headache log for frequency and severity  Stay hydrated and sleep well, reduce stress level.   Call our Clinic with headache worsening at [phone]  Follow up in 6-12 months or sooner if needed        Past  Medical History reviewed  Social History   Substance Use Topics     Smoking status: Current Every Day Smoker     Packs/day: 0.30     Years: 2.00     Types: Cigarettes     Smokeless tobacco: Current User     Alcohol use No      Comment: rarely    reviewed and verified with the patient    Current Outpatient Prescriptions   Medication Sig Dispense Refill     methylPREDNISolone (MEDROL DOSEPAK) 4 MG tablet Follow package instructions 21 tablet 0     naproxen (NAPROXEN) 500 MG TBEC May take 1-2 tabs every 12 hours as needed. Limit use to no more than 2 days per week 30 tablet 3     OnabotulinumtoxinA (BOTOX IJ) Inject 150 Units into the muscle Lot # /C3  Exp: 10/2020       OnabotulinumtoxinA (BOTOX IJ) Inject 150 Units into the muscle Lot # /C3  Exp: 7/2020       OnabotulinumtoxinA (BOTOX IJ) Inject 150 Units as directed once Lot /C3  Exp 04/2020       promethazine (PHENERGAN) 25 MG Suppository Place 1 suppository (25 mg) rectally every 6 hours as needed for nausea 30 suppository 6     SUMAtriptan (IMITREX STATDOSE) 6 MG/0.5ML pen injector kit Inject 6 mg may repeat after 2 hours if needed; MAX 6 mg/dose and 12 mg/24 hours. Limit use to no more than two days per week. 1 kit 11     SUMAtriptan (IMITREX STATDOSE) 6 MG/0.5ML refill cartridge Inject 0.5 mLs (6 mg) Subcutaneous at onset of headache for migraine 2 Cartridge 11     SUMAtriptan (IMITREX) 100 MG tablet Take 1 tablet by mouth as needed  2   reviewed and verified with the patient    Review of Systems:   A 10-point ROS including constitutional, eyes, respiratory, cardiovascular, gastroenterology, genitourinary, integumentary, musculoskeletal, neurology and psychiatric were all negative except as mentioned in the interval history.      General Exam:   /84 (BP Location: Left arm, Patient Position: Chair, Cuff Size: Adult Regular)  Pulse 93  SpO2 96%  GEN: Awake, NAD; good eye contact, responses appropriately   HEENT: Head  atraumatic/Normocephalic. Scalp normal. Pupils equally round, 4 mm, reactive to light and accommodation, sclera and conjunctiva normal. Speech is fluent without dysarthria. EOM intact. Face is symmetrical. Intact and symmetrical eyebrow and lid raise and eyelid closure, smiles. Hearing Intact to conversation speech. The palates elevates symmetrical. Strength  5/5 in the upper and lower extremities bilaterally. Sensation is intact to  touch throughout.  Reflexes symmetrical at biceps, triceps, brachioradialis, patellar, and Achilles. Negative Babinski with downgoing toes bilaterally. Coordination reveals finger-nose-finger Normal casual gait.    Assessment and Plan:  See Interval History for our discussion and plan    I discussed all my recommendation with Jose Eduardo Ryan. The patient verbalizes understanding and comfortable with the plan. The patient has our clinic phone number to call with any questions or concerns. All of the patient's questions were answered from the best of my current knowledge.   Time spent with pt answering questions, discussing findings, counseling and coordinating care was more than 50% the appointment time, 24  minutes.         BRANDI Ma, CNP  [facility]

## 2018-11-05 NOTE — MR AVS SNAPSHOT
After Visit Summary   2018    Jose Eduardo Ryan    MRN: [id number]           Patient Information     Date Of Birth          1995        Visit Information        Provider Department      2018 12:30 PM Tanna Menendez APRN [facility] Neurology        Today's Diagnoses     Intractable migraine with aura with status migrainosus          Care Instructions    Plan:  Headache log for frequency and severity  Stay hydrated and sleep well, reduce stress level.   Call our Clinic with headache worsening at [phone]  Follow up in 6-12 months or sooner if needed                    Follow-ups after your visit        Who to contact     Please call your clinic at [phone] to:    Ask questions about your health    Make or cancel appointments    Discuss your medicines    Learn about your test results    Speak to your doctor            Additional Information About Your Visit        MyChart Information     [employer] is an electronic gateway that provides easy, online access to your medical records. With [employer], you can request a clinic appointment, read your test results, renew a prescription or communicate with your care team.     To sign up for [email or web address] visit the website at [email or web address]   You will be asked to enter the access code listed below, as well as some personal information. Please follow the directions to create your username and password.     Your access code is: [email or web address]  Expires: 2019  5:30 AM     Your access code will  in 90 days. If you need help or a new code, please contact your [facility] or call [phone] for assistance.        Care EveryWhere ID     This is your Care EveryWhere ID. This could be used by other organizations to access your [facility] medical records  [id number]        Your Vitals Were     Pulse Pulse Oximetry                93 96%           Blood Pressure from Last 3 Encounters:   18  137/84   05/04/18 144/72   04/02/18 142/80    Weight from Last 3 Encounters:   05/04/18 91.6 kg (202 lb)   04/02/18 96.6 kg (213 lb)   12/28/17 96.8 kg (213 lb 8 oz)              Today, you had the following     No orders found for display       Primary Care Provider    [facility]       No address on file        Equal Access to Services     KIP CLEMENTE : Hadii aad ku hadasho Soomaali, waaxda luqadaha, qaybta kaalmada adeegyada, waxay idiin hayaan zeinabberta de anda laMaryluis . So [place] -[phone].    ATENCIÓN: Si habla español, tiene a lovett disposición servicios gratuitos de asistencia lingüística. Faithame al [phone].    We comply with applicable federal civil rights laws and [place] laws. We do not discriminate on the basis of race, color, national origin, age, disability, sex, sexual orientation, or gender identity.            Thank you!     Thank you for choosing [facility]UROLOG[facility]  for your care. Our goal is always to provide you with excellent care. Hearing back from our patients is one way we can continue to improve our services. Please take a few minutes to complete the written survey that you may receive in the mail after your visit with us. Thank you!             Your Updated Medication List - Protect others around you: Learn how to safely use, store and throw away your medicines at www.disposemymeds.org.          This list is accurate as of 11/5/18  1:44 PM.  Always use your most recent med list.                   Brand Name Dispense Instructions for use Diagnosis    methylPREDNISolone 4 MG tablet    MEDROL DOSEPAK    21 tablet    Follow package instructions    Intractable migraine with aura with status migrainosus       naproxen 500 MG Tbec    naproxen    30 tablet    May take 1-2 tabs every 12 hours as needed. Limit use to no more than 2 days per week        promethazine 25 MG Suppository    PHENERGAN    30 suppository    Place 1 suppository (25 mg) rectally every 6 hours as needed for  nausea    Intractable migraine with aura with status migrainosus       * SUMAtriptan 100 MG tablet    IMITREX     Take 1 tablet by mouth as needed        * SUMAtriptan 6 MG/0.5ML pen injector kit    IMITREX STATDOSE    1 kit    Inject 6 mg may repeat after 2 hours if needed; MAX 6 mg/dose and 12 mg/24 hours. Limit use to no more than two days per week.    Intractable migraine with aura with status migrainosus       * SUMAtriptan 6 MG/0.5ML refill cartridge    IMITREX STATDOSE    2 Cartridge    Inject 0.5 mLs (6 mg) Subcutaneous at onset of headache for migraine    Intractable migraine with aura with status migrainosus       * Notice:  This list has 3 medication(s) that are the same as other medications prescribed for you. Read the directions carefully, and ask your doctor or other care provider to review them with you.

## 2018-12-05 ENCOUNTER — TELEPHONE (OUTPATIENT)
Dept: NEUROLOGY | Facility: CLINIC | Age: 23
End: 2018-12-05

## 2018-12-05 NOTE — TELEPHONE ENCOUNTER
[facility] Call Center    Phone Message    May a detailed message be left on voicemail: yes    Reason for Call: Other: Per call from PT per insurance is requesting 3 authorization override to [facility] to ask to allow more refills for SUMAtriptan (IMITREX STATDOSE) 6 MG/0.5ML pen injector kit. Per PT he doesn't have contact # for them but he can be reached at the above # if any questions     Action Taken: Message routed to:  [facility] Neurology

## 2018-12-05 NOTE — TELEPHONE ENCOUNTER
Prior Authorization Specialty Medication Request    Medication/Dose: Per call from PT per insurance is requesting 3 authorization override to [facility] to ask to allow more refills for SUMAtriptan (IMITREX STATDOSE) 6 MG/0.5ML pen injector kit. Per PT he doesn't have contact # for them but he can be reached at the above # if any questions  ICD code (if different than what is on RX):  N/A  Previously Tried and Failed:  N/A    Important Lab Values: N/A  Rationale: N/A    Insurance Name: [facility]  Insurance ID: [id number]   Insurance Phone Number: : [phone]     Pharmacy Information (if different than what is on RX)  [facility]:  [facility]   Phone:

## 2018-12-06 NOTE — TELEPHONE ENCOUNTER
Central Prior Authorization Team   Phone: [phone]      PA Initiation    Medication: SUMAtriptan (IMITREX STATDOSE) 6 MG/0.5ML pen injector kit-PA Initiated  Insurance Company: [employer] [place] - Phone [phone] Fax [phone]  Pharmacy Filling the Rx: [facility] [address] [place] [address] [facility] [place] [address] & [facility]  Filling Pharmacy Phone: [phone]  Filling Pharmacy Fax: [phone]  Start Date: 12/6/2018

## 2018-12-07 NOTE — TELEPHONE ENCOUNTER
Prior Authorization Not Needed per Insurance    Medication: SUMAtriptan (IMITREX STATDOSE) 6 MG/0.5ML pen injector kit-PA Not Needed  Insurance Company: [employer] [place] - Phone [phone] Fax [phone]  Expected CoPay:      Pharmacy Filling the Rx: [facility] [address] [place] [id number] [facility] AT [place] [address] & [facility]  Pharmacy Notified: Yes  Patient Notified: No    Spoke to insurance regarding early fill. Insurance rep stated, medication is too soon to fill until 12/9/18. Last fill was 11/27/18 for 15 days. Patient should be able to fill 3 pens for 21 days on 12/9/18. Pharmacy has been notified and will put a note in for reference.

## 2019-02-04 DIAGNOSIS — G43.111 INTRACTABLE MIGRAINE WITH AURA WITH STATUS MIGRAINOSUS: ICD-10-CM

## 2019-02-04 RX ORDER — CEFUROXIME AXETIL 250 MG/1
TABLET ORAL
Qty: 1 KIT | Refills: 11 | Status: SHIPPED | OUTPATIENT
Start: 2019-02-04 | End: 2019-12-05

## 2019-06-14 NOTE — PROGRESS NOTES
Patient's mom called [facility] regarding pt's continued headache. I called patient as I do not have a release of information to speak with his mother. Patient states that he is still struggling with his headache from Tuesday. Tanna had recommended coming in for a Toradol shot. Unfortunately, Tanna is out of town for the rest of the week and so is Dr. Coy. I suggested that patient go to his family physician or urgent care/ED for acute pain relief. I also suggested that patient make an appointment with Tanna as he has not been seen for quite some time. Patient verbalized understanding and agreement with plan. Message sent to our clinic coordinator to help patient arrange appointment.      Private car

## 2019-07-15 ENCOUNTER — OFFICE VISIT (OUTPATIENT)
Dept: NEUROLOGY | Facility: CLINIC | Age: 24
End: 2019-07-15
Payer: COMMERCIAL

## 2019-07-15 VITALS
WEIGHT: 196.3 LBS | OXYGEN SATURATION: 99 % | SYSTOLIC BLOOD PRESSURE: 125 MMHG | DIASTOLIC BLOOD PRESSURE: 86 MMHG | BODY MASS INDEX: 27.38 KG/M2 | HEART RATE: 77 BPM | RESPIRATION RATE: 12 BRPM

## 2019-07-15 DIAGNOSIS — R51.9 HEADACHE IN FRONT OF HEAD: Primary | ICD-10-CM

## 2019-07-15 DIAGNOSIS — R20.0 RT FACIAL NUMBNESS: ICD-10-CM

## 2019-07-15 ASSESSMENT — PAIN SCALES - GENERAL: PAINLEVEL: NO PAIN (0)

## 2019-07-15 NOTE — PATIENT INSTRUCTIONS
Plan:  Brain MRI for new headache symptoms and facial numbness  Headache log for frequency and severity  Acute treatment with sumatriptan and promethazine as needed and naproxen as needed  Headache prevention -a trial of Emgality if no changes on brain MRI to explain other etiology of your new headache location  Follow up after at least 3 doses of Emgality or Aimovig        Patient Education     Galcanezumab injection  Brand Name: Emgality  What is this medicine?  GALCANEZUMAB (gal ka OLGA ue mab) is used to prevent migraine headaches.  How should I use this medicine?  This medicine is for injection under the skin. You will be taught how to prepare and give this medicine. Use exactly as directed. Take your medicine at regular intervals. Do not take your medicine more often than directed.  It is important that you put your used needles and syringes in a special sharps container. Do not put them in a trash can. If you do not have a sharps container, call your pharmacist or healthcare provider to get one.  Talk to your pediatrician regarding the use of this medicine in children. Special care may be needed.  What side effects may I notice from receiving this medicine?  Side effects that you should report to your doctor or health care professional as soon as possible:    allergic reactions like skin rash, itching or hives, swelling of the face, lips, or tongue  Side effects that usually do not require medical attention (report these to your doctor or health care professional if they continue or are bothersome):    pain, redness, or irritation at site where injected  What may interact with this medicine?  Interactions are not expected.  What if I miss a dose?  If you miss a dose, take it as soon as you can. If it is almost time for your next dose, take only that dose. Do not take double or extra doses.  Where should I keep my medicine?  Keep out of the reach of children.    You will be instructed on how to store this  medicine. Throw away any unused medicine after the expiration date on the label.  What should I tell my health care provider before I take this medicine?  They need to know if you have any of these conditions:    an unusual or allergic reaction to galcanezumab, other medicines, foods, dyes, or preservatives    pregnant or trying to get pregnant    breast-feeding  What should I watch for while using this medicine?  Tell your doctor or healthcare professional if your symptoms do not start to get better or if they get worse.  NOTE:This sheet is a summary. It may not cover all possible information. If you have questions about this medicine, talk to your doctor, pharmacist, or health care provider. Copyright  2018 Elsevier

## 2019-07-15 NOTE — PROGRESS NOTES
Re: Jose Eduardo Ryan      MRN# [id number]  YOB: 1995  Date of Visit: 7/15/2019    OUTPATIENT NEUROLOGY VISIT NOTE    Reason for Visit:  Headache follow up    Interval History:  Jose Eduardo Ryan is a 24-year-old male presents to the clinic today for headache follow up.    The patient was seen by Dr. Harry in 08/2014 for an initial migraine headache evaluation.  The patient has also been seen at the [facility] in [place], the site of the [facility] and [facility] in the past.  He was seen by a neurologist in [place], [place].    He has a history of headache since the age of 3.  Strong family history of migraine headache in his mother who lives in [place]  Patient reports that headaches daily since July 4th and some relief for two days then headache again this weekend Friday-Sutarday. No headache today.   Patient reports that sumatriptan injectable typically works 95% time works but not around July 4th. Patient reports that headache changed location and localized to the right supraorbital area and constant throbbing pain and couple of headaches got to 10/10 and caused right facial numbness lasting for a couple of hours. No speech problems. No vision changes.   Light and noise sensitivity and nausea and vomiting.   Patient reports that he cut off coffee/pop. Patient reports that he changed his diet.   Headache frequency -April -May about once per month and in June -headache moved to the right frontal area and about 12 headache days but did not miss work. Patient reports that in July so far 7 headache days. Patient reports that headache frequency has been increasing in the past two months.   Patient reports that he sleeps about 7 hours and poor sleep and stress level is high-work related and students loans -payments.     Patient reports that sumatriptan injectables are much better than oral sumatriptan because injectable works but $50 per 3 injections and  patient spends $300 per month just on sumatriptan.   Patient she has been trying to quit smoking and denies any alcohol use   Trying to stay hydrated  No pop in 3 months    Discussed a trial of CGRPs and patient reports that he will try  Anything because two last months were miserable.     Headache treatment tried:  Botox   Amitriptyline  Sumatriptan pills, Relpax and maxalt  Medrol pack  Zofran causes nausea    Brain MRI at the age of 14-15 and none since then.     Denies history of head or neck trauma, dizziness, vertigo, loss of consciousness, seizure, double vision, blurred vision, hearing difficulty, speech or swallowing difficulty, weakness or numbness in face, arms or legs, urinary or bowel incontinence, coordination problems or gait difficulty, fever or chills.      Plan:  Brain MRI for new headache symptoms and facial numbness  Headache log for frequency and severity  Acute treatment with sumatriptan and promethazine as needed and naproxen as needed  Headache prevention -a trial of Emgality if no changes on brain MRI to explain other etiology of your new headache location  Follow up after at least 3 doses of Emgality or Aimovig    Past Medical History reviewed   Social History     Tobacco Use     Smoking status: Current Every Day Smoker     Packs/day: 0.30     Years: 2.00     Pack years: 0.60     Types: Cigarettes     Smokeless tobacco: Current User   Substance Use Topics     Alcohol use: No     Comment: rarely    reviewed and verified with the patient   No Known Allergies    Current Outpatient Medications   Medication Sig Dispense Refill     methylPREDNISolone (MEDROL DOSEPAK) 4 MG tablet Follow package instructions 21 tablet 0     naproxen (NAPROXEN) 500 MG TBEC May take 1-2 tabs every 12 hours as needed. Limit use to no more than 2 days per week 30 tablet 3     promethazine (PHENERGAN) 25 MG Suppository Place 1 suppository (25 mg) rectally every 6 hours as needed for nausea 30 suppository 6      SUMAtriptan (IMITREX STATDOSE) 6 MG/0.5ML pen injector kit Inject 6 mg may repeat after 2 hours if needed; MAX 6 mg/dose and 12 mg/24 hours. Limit use to no more than two days per week. 1 kit 11     SUMAtriptan (IMITREX STATDOSE) 6 MG/0.5ML refill cartridge Inject 0.5 mLs (6 mg) Subcutaneous at onset of headache for migraine (may repeat in 2 hours as needed. Max 12 mg in 24 hours) 6 Cartridge 11     SUMAtriptan (IMITREX) 100 MG tablet Take 1 tablet by mouth as needed  2   reviewed and verified with the patient    Review of Systems:   A 10-point ROS including constitutional, eyes, respiratory, cardiovascular, gastroenterology, genitourinary, integumentary, musculoskeletal, neurology and psychiatric were reviewed and positives  as mentioned in the interval history.      General Exam:   /86 (BP Location: Left arm, Patient Position: Sitting, Cuff Size: Adult Regular)   Pulse 77   Resp 12   Wt 89 kg (196 lb 4.8 oz)   SpO2 99%   BMI 27.38 kg/m    GEN: Awake, NAD; good eye contact, responses appropriately. Speech is fluent without dysarthria. EOM intact. There is no nystagmus. Has conjugated gaze. Face is symmetrical. Intact and symmetrical eyebrow and lid raise and eyelid closure, smiles. Hearing Intact to conversation speech. The palates elevates symmetrical. The tongue protrudes midline with no atrophy or fasciculations. Strength  5/5 in the upper and lower extremities bilaterally. Sensation is intact to  touch throughout.  Reflexes symmetrical at biceps, triceps, brachioradialis, patellar, and Achilles. Negative Babinski with downgoing toes bilaterally. Coordination reveals finger-nose-finger, rapid alternating movements with normal speed and accuracy. Normal casual gait.    Assessment and Plan:  See Interval History for our discussion and plan    I discussed all my recommendation with Jose Eduardo Ryan. The patient verbalizes understanding and comfortable with the plan. The patient has our clinic phone number  to call with any questions or concerns. All of the patient's questions were answered from the best of my current knowledge.       Time spent with pt answering questions, discussing findings, counseling and coordinating care was more than 50% the appointment time, 27  minutes.         BRANDI Ma, [facility]

## 2019-07-15 NOTE — NURSING NOTE
Chief Complaint   Patient presents with     RECHECK     [facility] RETURN- MIGRAINES ARE GETTING WORSE      Isaiah Randall, CMA

## 2019-08-01 ENCOUNTER — TELEPHONE (OUTPATIENT)
Dept: NEUROLOGY | Facility: CLINIC | Age: 24
End: 2019-08-01

## 2019-08-01 ENCOUNTER — ANCILLARY PROCEDURE (OUTPATIENT)
Dept: MRI IMAGING | Facility: CLINIC | Age: 24
End: 2019-08-01
Attending: NURSE PRACTITIONER
Payer: COMMERCIAL

## 2019-08-01 ENCOUNTER — OFFICE VISIT (OUTPATIENT)
Dept: NEUROLOGY | Facility: CLINIC | Age: 24
End: 2019-08-01
Payer: COMMERCIAL

## 2019-08-01 VITALS
BODY MASS INDEX: 26.82 KG/M2 | SYSTOLIC BLOOD PRESSURE: 129 MMHG | HEIGHT: 72 IN | TEMPERATURE: 98 F | WEIGHT: 198 LBS | RESPIRATION RATE: 15 BRPM | DIASTOLIC BLOOD PRESSURE: 93 MMHG | HEART RATE: 77 BPM | OXYGEN SATURATION: 98 %

## 2019-08-01 DIAGNOSIS — R51.9 HEADACHE IN FRONT OF HEAD: ICD-10-CM

## 2019-08-01 DIAGNOSIS — R20.0 RT FACIAL NUMBNESS: ICD-10-CM

## 2019-08-01 DIAGNOSIS — G43.111 INTRACTABLE MIGRAINE WITH AURA WITH STATUS MIGRAINOSUS: Primary | ICD-10-CM

## 2019-08-01 RX ORDER — GADOBUTROL 604.72 MG/ML
10 INJECTION INTRAVENOUS ONCE
Status: COMPLETED | OUTPATIENT
Start: 2019-08-01 | End: 2019-08-01

## 2019-08-01 RX ORDER — METHYLPREDNISOLONE 4 MG
TABLET, DOSE PACK ORAL
Qty: 21 TABLET | Refills: 0 | Status: SHIPPED | OUTPATIENT
Start: 2019-08-01 | End: 2019-12-05

## 2019-08-01 RX ADMIN — GADOBUTROL 9 ML: 604.72 INJECTION INTRAVENOUS at 11:19

## 2019-08-01 ASSESSMENT — HEADACHE IMPACT TEST (HIT 6)
WHEN YOU HAVE HEADACHES HOW OFTEN IS THE PAIN SEVERE: VERY OFTEN
HOW OFTEN DID HEADACHS LIMIT CONCENTRATION ON WORK OR DAILY ACTIVITY: VERY OFTEN
HOW OFTEN HAVE YOU FELT TOO TIRED TO WORK BECAUSE OF YOUR HEADACHES: VERY OFTEN
WHEN YOU HAVE A HEADACHE HOW OFTEN DO YOU WISH YOU COULD LIE DOWN: ALWAYS
HIT6 TOTAL SCORE: 70
HOW OFTEN DO HEADACHES LIMIT YOUR DAILY ACTIVITIES: VERY OFTEN
HOW OFTEN HAVE YOU FELT FED UP OR IRRITATED BECAUSE OF YOUR HEADACHES: ALWAYS

## 2019-08-01 ASSESSMENT — ENCOUNTER SYMPTOMS
SINUS PAIN: 0
SYNCOPE: 0
LEG PAIN: 0
PALPITATIONS: 1
LIGHT-HEADEDNESS: 1
TROUBLE SWALLOWING: 0
HYPOTENSION: 0
SINUS CONGESTION: 0
NECK MASS: 0
ORTHOPNEA: 0
SLEEP DISTURBANCES DUE TO BREATHING: 0
TASTE DISTURBANCE: 0
SORE THROAT: 0
HOARSE VOICE: 0
EXERCISE INTOLERANCE: 0
SMELL DISTURBANCE: 0
HYPERTENSION: 0

## 2019-08-01 ASSESSMENT — MIFFLIN-ST. JEOR: SCORE: 1921.12

## 2019-08-01 ASSESSMENT — PAIN SCALES - GENERAL: PAINLEVEL: NO PAIN (0)

## 2019-08-01 NOTE — DISCHARGE INSTRUCTIONS
MRI Contrast Discharge Instructions    The IV contrast you received today will pass out of your body in your  urine. This will happen in the next 24 hours. You will not feel this process.  Your urine will not change color.    Drink at least 4 extra glasses of water or juice today (unless your doctor  has restricted your fluids). This reduces the stress on your kidneys.  You may take your regular medicines.    If you are on dialysis: It is best to have dialysis today.    If you have a reaction: Most reactions happen right away. If you have  any new symptoms after leaving the hospital (such as hives or swelling),  call your hospital at the correct number below. Or call your family doctor.  If you have breathing distress or wheezing, call 911.    Special instructions: ***    I have read and understand the above information.    Signature:______________________________________ Date:___________    Staff:__________________________________________ Date:___________     Time:__________    [place] [facility] Departments:    ___[place]: [phone]  ___[facility]: [phone]  ___[facility]: [phone] ___[facility]: [phone]  ___[facility]: [phone]  ___[facility]: [phone]  ___[phone]  ___[facility]: [phone]  ___[phone]

## 2019-08-01 NOTE — PROGRESS NOTES
Re: Jose Eduardo Ryan      MRN# [id number]  YOB: 1995  Date of Visit:8/1/2019     OUTPATIENT NEUROLOGY VISIT NOTE    Reason for Visit:  Headache follow up    Interval History:  Jose Eduardo Ryan is a 24-year-old male presents to the clinic today for headache follow up.  Accompanied to today's appointment by his GF.  The patient was seen by Dr. Harry in 08/2014 for an initial migraine headache evaluation.  The patient has also been seen at the [facility] in [place], the site of the [place] [facility] and [facility] in the past.  He was seen by a neurologist in [place], [place].    He has a history of headache since the age of 3.  Strong family history of migraine headache in his mother who lives in [place].  Dr. Harry referred the patient to Dr. Coy for Botox therapy which patient has been receiving since  2014.    He was on amitriptyline, topiramate, valproate, verapamil in the past.   His past abortive therapies were with Relpax, Maxalt and injectable sumatriptan.  Brain MRI reviewed.   Headaches 15+ per month and duration 1-24 hours. Right supraorbital area and always on the right side. Associated with light and noise sensitivity, nausea and vomiting. Triggers-heat   Headache treatment in the past Botox treatment, sumatriptan, promethazine, naproxen, medrol pack, topiramate, amitriptyline, depakote, verapamil.   Brain MRI reviewed  Emgality treatment discussed with the patient.     Plan:  Headache log for headache frequency and severity and sumatriptan use.   A trial of Emgality as instructed for migraine prevention  Sumatriptan as needed for acute headache.   Medrol pack to help with breaking your headache cycle.   Stay hydrated and keep sleep routine.   Follow up in 3 months or sooner after starting Emgality    Past Medical History reviewed   Social History     Tobacco Use     Smoking status: Current Every Day Smoker     Packs/day: 0.30     Years: 2.00      Pack years: 0.60     Types: Cigarettes     Smokeless tobacco: Current User   Substance Use Topics     Alcohol use: No     Comment: rarely    reviewed and verified with the patient   No Known Allergies    Current Outpatient Medications   Medication Sig Dispense Refill     SUMAtriptan (IMITREX STATDOSE) 6 MG/0.5ML pen injector kit Inject 6 mg may repeat after 2 hours if needed; MAX 6 mg/dose and 12 mg/24 hours. Limit use to no more than two days per week. 1 kit 11     SUMAtriptan (IMITREX STATDOSE) 6 MG/0.5ML refill cartridge Inject 0.5 mLs (6 mg) Subcutaneous at onset of headache for migraine (may repeat in 2 hours as needed. Max 12 mg in 24 hours) 6 Cartridge 11     SUMAtriptan (IMITREX) 100 MG tablet Take 1 tablet by mouth as needed  2     methylPREDNISolone (MEDROL DOSEPAK) 4 MG tablet Follow package instructions (Patient not taking: Reported on 8/1/2019) 21 tablet 0     naproxen (NAPROXEN) 500 MG TBEC May take 1-2 tabs every 12 hours as needed. Limit use to no more than 2 days per week (Patient not taking: Reported on 8/1/2019) 30 tablet 3     promethazine (PHENERGAN) 25 MG Suppository Place 1 suppository (25 mg) rectally every 6 hours as needed for nausea (Patient not taking: Reported on 8/1/2019) 30 suppository 6   reviewed and verified with the patient    Review of Systems:   A 10-point ROS including constitutional, eyes, respiratory, cardiovascular, gastroenterology, genitourinary, integumentary, musculoskeletal, neurology and psychiatric were all reviewed except as mentioned in the interval history.   Answers for HPI/ROS submitted by the patient on 8/1/2019   General Symptoms: No  Skin Symptoms: No  HENT Symptoms: Yes  EYE SYMPTOMS: No  HEART SYMPTOMS: Yes  LUNG SYMPTOMS: No  INTESTINAL SYMPTOMS: No  URINARY SYMPTOMS: No  REPRODUCTIVE SYMPTOMS: No  SKELETAL SYMPTOMS: No  BLOOD SYMPTOMS: No  NERVOUS SYSTEM SYMPTOMS: No  MENTAL HEALTH SYMPTOMS: No  Ear pain: No  Ear discharge: No  Hearing loss: No  Tinnitus:  Yes  Nosebleeds: No  Congestion: No  Sinus pain: No  Trouble swallowing: No   Voice hoarseness: No  Mouth sores: No  Sore throat: No  Tooth pain: No  Gum tenderness: No  Bleeding gums: No  Change in taste: No  Change in sense of smell: No  Dry mouth: Yes  Hearing aid used: No  Neck lump: No  Chest pain or pressure: No  Fast or irregular heartbeat: Yes  Pain in legs with walking: No  Trouble breathing while lying down: No  Fingers or toes appear blue: No  High blood pressure: No  Low blood pressure: No  Fainting: No  Murmurs: No  Pacemaker: No  Varicose veins: No  Edema or swelling: No  Wake up at night with shortness of breath: No  Light-headedness: Yes  Exercise intolerance: No     General Exam:   BP (!) 129/93   Pulse 77   Temp 98  F (36.7  C) (Oral)   Resp 15   Ht 1.829 m (6')   Wt 89.8 kg (198 lb)   SpO2 98%   BMI 26.85 kg/m    GEN: Awake, NAD; good eye contact, responses appropriately, healthy appearing, GF in the room   HEENT: Head atraumatic/Normocephalic. Scalp normal. Pupils equally round, 4 mm, reactive to light and accommodation, sclera and conjunctiva normal. Fundoscopic examination reveals normal vessels no papilledema.   Neck: Easily moveable without resistance   Speech is fluent without dysarthria. EOM intact.  Face is symmetrical. Intact and symmetrical eyebrow and lid raise and eyelid closure, smiles. Hearing Intact to conversation speech.  Strength intact in the upper and lower extremities bilaterally.  Normal casual gait.      Assessment and Plan:  See Interval History for our discussion and plan    Prescription for Emgality provided. Correct use and course provided. Expected benefits and typical side effects reviewed. Safety of concomitant medications and interactions reviewed. Patient taught signs and symptoms of adverse reactions and allergies. Patient understands teaching and accepts risks of prescribed medication regimen.    I discussed all my recommendation with Jose Eduardo Ryan. The  patient verbalizes understanding and comfortable with the plan. The patient has our clinic phone number to call with any questions or concerns. All of the patient's questions were answered from the best of my current knowledge.     Time spent with pt answering questions, discussing findings, counseling and coordinating care was more than 50% the appointment time, 23  minutes.         BRANDI Ma, [facility]

## 2019-08-01 NOTE — PATIENT INSTRUCTIONS
Plan:  Headache log for headache frequency and severity and sumatriptan use.   A trial of Emgality as instructed for migraine prevention  Sumatriptan as needed for acute headache.   Medrol pack to help with breaking your headache cycle. Do not take sumatriptan   Stay hydrated and keep sleep routine.   Follow up in 3 months or sooner after starting Emgality      Patient Education     Galcanezumab injection  Brand Name: Emgality  What is this medicine?  GALCANEZUMAB (gal ka OLGA ue mab) is used to prevent migraine headaches.  How should I use this medicine?  This medicine is for injection under the skin. You will be taught how to prepare and give this medicine. Use exactly as directed. Take your medicine at regular intervals. Do not take your medicine more often than directed.  It is important that you put your used needles and syringes in a special sharps container. Do not put them in a trash can. If you do not have a sharps container, call your pharmacist or healthcare provider to get one.  Talk to your pediatrician regarding the use of this medicine in children. Special care may be needed.  What side effects may I notice from receiving this medicine?  Side effects that you should report to your doctor or health care professional as soon as possible:    allergic reactions like skin rash, itching or hives, swelling of the face, lips, or tongue  Side effects that usually do not require medical attention (report these to your doctor or health care professional if they continue or are bothersome):    pain, redness, or irritation at site where injected  What may interact with this medicine?  Interactions are not expected.  What if I miss a dose?  If you miss a dose, take it as soon as you can. If it is almost time for your next dose, take only that dose. Do not take double or extra doses.  Where should I keep my medicine?  Keep out of the reach of children.    You will be instructed on how to store this medicine. Throw  away any unused medicine after the expiration date on the label.  What should I tell my health care provider before I take this medicine?  They need to know if you have any of these conditions:    an unusual or allergic reaction to galcanezumab, other medicines, foods, dyes, or preservatives    pregnant or trying to get pregnant    breast-feeding  What should I watch for while using this medicine?  Tell your doctor or healthcare professional if your symptoms do not start to get better or if they get worse.  NOTE:This sheet is a summary. It may not cover all possible information. If you have questions about this medicine, talk to your doctor, pharmacist, or health care provider. Copyright  2018 [employer]           Patient Education     Patient Education    Methylprednisolone Acetate Suspension for injection    Methylprednisolone Oral tablet    Methylprednisolone Sodium Succinate Solution for injection  Methylprednisolone Oral tablet  What is this medicine?  METHYLPREDNISOLONE (meth ill pred NISS oh lone) is a corticosteroid. It is commonly used to treat inflammation of the skin, joints, lungs, and other organs. Common conditions treated include asthma, allergies, and arthritis. It is also used for other conditions, such as blood disorders and diseases of the adrenal glands.  This medicine may be used for other purposes; ask your health care provider or pharmacist if you have questions.  What should I tell my health care provider before I take this medicine?  They need to know if you have any of these conditions:    Cushing's syndrome    diabetes    glaucoma    heart problems or disease    high blood pressure    infection such as herpes, measles, tuberculosis, or chickenpox    kidney disease    liver disease    mental problems    myasthenia gravis    osteoporosis    seizures    stomach ulcer or intestine disease including colitis and diverticulitis    thyroid problem    an unusual or allergic reaction to lactose,  methylprednisolone, other medicines, foods, dyes, or preservatives    pregnant or trying to get pregnant    breast-feeding  How should I use this medicine?  Take this medicine by mouth with a drink of water. Follow the directions on the prescription label. Take it with food or milk to avoid stomach upset. If you are taking this medicine once a day, take it in the morning. Do not take more medicine than you are told to take. Do not suddenly stop taking your medicine because you may develop a severe reaction. Your doctor will tell you how much medicine to take. If your doctor wants you to stop the medicine, the dose may be slowly lowered over time to avoid any side effects.  Talk to your pediatrician regarding the use of this medicine in children. Special care may be needed.  Overdosage: If you think you have taken too much of this medicine contact a poison control center or emergency room at once.  NOTE: This medicine is only for you. Do not share this medicine with others.  What if I miss a dose?  If you miss a dose, take it as soon as you can. If it is almost time for your next dose, talk to your doctor or health care professional. You may need to miss a dose or take an extra dose. Do not take double or extra doses without advice.  What may interact with this medicine?  Do not take this medicine with any of the following medications:    mifepristone  This medicine may also interact with the following medications:    tacrolimus    vaccines    warfarin  This list may not describe all possible interactions. Give your health care provider a list of all the medicines, herbs, non-prescription drugs, or dietary supplements you use. Also tell them if you smoke, drink alcohol, or use illegal drugs. Some items may interact with your medicine.  What should I watch for while using this medicine?  Visit your doctor or health care professional for regular checks on your progress. If you are taking this medicine for a long time,  carry an identification card with your name and address, the type and dose of your medicine, and your doctor's name and address.  The medicine may increase your risk of getting an infection. Stay away from people who are sick. Tell your doctor or health care professional if you are around anyone with measles or chickenpox.  If you are going to have surgery, tell your doctor or health care professional that you have taken this medicine within the last twelve months.  Ask your doctor or health care professional about your diet. You may need to lower the amount of salt you eat.  The medicine can increase your blood sugar. If you are a diabetic check with your doctor if you need help adjusting the dose of your diabetic medicine.  What side effects may I notice from receiving this medicine?  Side effects that you should report to your doctor or health care professional as soon as possible:    allergic reactions like skin rash, itching or hives, swelling of the face, lips, or tongue    eye pain, decreased or blurred vision, or bulging eyes    fever, sore throat, sneezing, cough, or other signs of infection, wounds that will not heal    increased thirst    mental depression, mood swings, mistaken feelings of self importance or of being mistreated    pain in hips, back, ribs, arms, shoulders, or legs    swelling of the ankles, feet, hands    trouble passing urine or change in the amount of urine  Side effects that usually do not require medical attention (report to your doctor or health care professional if they continue or are bothersome):    confusion, excitement, restlessness    headache    nausea, vomiting    skin problems, acne, thin and shiny skin    weight gain  This list may not describe all possible side effects. Call your doctor for medical advice about side effects. You may report side effects to FDA at 7-450-FDA-7869.  Where should I keep my medicine?  Keep out of the reach of children.  Store at room  temperature between 20 and 25 degrees C (68 and 77 degrees F). Throw away any unused medicine after the expiration date.  NOTE:This sheet is a summary. It may not cover all possible information. If you have questions about this medicine, talk to your doctor, pharmacist, or health care provider. Copyright  2016 [employer]

## 2019-08-01 NOTE — NURSING NOTE
Chief Complaint   Patient presents with     Headache     [facility] RETURN HEADACHE- MIGRAINES ARE GETTING WORSE- MRI COMPLETED       Jus Zarate, EMT

## 2019-08-02 NOTE — RESULT ENCOUNTER NOTE
Emerson Whitt,   Please see your brain MRI results. No acute findings at this time.   Please start recommended treatment plan and follow up as discussed.   Take care, Tanna

## 2019-08-05 NOTE — TELEPHONE ENCOUNTER
Prior Authorization Retail Medication Request    Medication/Dose: galcanezumab-gnlm (EMGALITY) 2 mLs (240 mg) Subcutaneous once for first dose, then 1 mL (120 mg) Subcutaneous every 28 days  ICD code (if different than what is on RX):  Intractable migraine with aura with status migrainosus [G43.111]   Previously Tried and Failed:  He was on amitriptyline, topiramate, valproate, verapamil in the past. Relpax, Maxalt and injectable sumatriptan. Headache treatment in the past Botox treatment, sumatriptan, promethazine, naproxen, medrol pack,  depakote  Headaches 15+ per month and duration 1-24 hours.    Rationale:  Tried above medication from 2014 until now. Botox was working until recently now rendered ineffective with headaches. Patient has family history of migraines and has suffered since age 3 with these migraines.    Insurance Name:  [id number]-[employer] [facility]   Insurance ID:  [id number]       Pharmacy Information (if different than what is on RX)  Name: [facility] #[id number] [place], [place] - [address] AT [place] [address] & [facility] [address] [facility]  Phone:  [phone]

## 2019-08-08 NOTE — TELEPHONE ENCOUNTER
Central Prior Authorization Team   Phone: [phone]      PA Initiation    Medication: galcanezumab-gnlm (EMGALITY) 2 mLs (240 mg) Subcutaneous once for first dose, then 1 mL (120 mg) Subcutaneous every 28 days- PA Pending  Insurance Company: [employer] [place] - Phone [phone] Fax [phone]  Pharmacy Filling the Rx: [facility] #[id number] [address] AT [place] [address] & [facility] [address] [facility]  Filling Pharmacy Phone: [phone]  Filling Pharmacy Fax: [phone]  Start Date: 8/8/2019

## 2019-08-12 ENCOUNTER — TELEPHONE (OUTPATIENT)
Dept: NEUROLOGY | Facility: CLINIC | Age: 24
End: 2019-08-12

## 2019-08-13 NOTE — TELEPHONE ENCOUNTER
Prior Authorization Approval    Authorization Effective Date: 8/9/2019  Authorization Expiration Date: 11/9/2019  Medication: galcanezumab-gnlm (EMGALITY) 120 MG/ML injection-APPROVED  Approved Dose/Quantity:   Reference #: [id number]   Insurance Company: [employer] [place] - Phone [phone] Fax [phone]  Expected CoPay:       CoPay Card Available:      Foundation Assistance Needed:    Which Pharmacy is filling the prescription (Not needed for infusion/clinic administered): [facility] #[id number] [facility] [address] AT [place] [address] & [facility]  Pharmacy Notified: Yes-Pharmacy will notify patient when ready.  Patient Notified: No

## 2019-08-19 ENCOUNTER — TELEPHONE (OUTPATIENT)
Dept: NEUROLOGY | Facility: CLINIC | Age: 24
End: 2019-08-19

## 2019-08-19 NOTE — TELEPHONE ENCOUNTER
----- Message from BRANDI Partida CNP sent at 8/16/2019 11:27 AM CDT -----  Regarding: FW: new emgality  Looks like emgality was approved. Please let him know and see if he needs to have a visit to inject it set up.   Thank you  ----- Message -----  From: Kassi Hamilton RN  Sent: 8/5/2019   8:17 AM  To: BRANDI Partida CNP  Subject: RE: new emgality                                 FYI -  Sent PA initiation in today. August 5, 2019    ----- Message -----  From: Tanna Menendez APRN CNP  Sent: 8/1/2019   1:13 PM  To: Kassi Hamilton RN  Subject: new emgality                                     Please initiate PA. Thank you

## 2019-08-19 NOTE — TELEPHONE ENCOUNTER
Left voicemail informing patient that we can do a patient teaching during a nurse visit if he would like to come in. He can watch videos online through emgality as well.

## 2019-10-23 ENCOUNTER — TELEPHONE (OUTPATIENT)
Dept: NEUROLOGY | Facility: CLINIC | Age: 24
End: 2019-10-23

## 2019-10-23 NOTE — TELEPHONE ENCOUNTER
[facility] Call Center    Phone Message    May a detailed message be left on voicemail: yes    Reason for Call: Other: Lo calling to state [employer] will be sending a fax to Tanna with PA info for Emgality. She would like a call back when Rx is ready to be picked up.     Action Taken: Message routed to:  [facility] apolinar neuro

## 2019-10-25 ENCOUNTER — TELEPHONE (OUTPATIENT)
Dept: NEUROLOGY | Facility: CLINIC | Age: 24
End: 2019-10-25

## 2019-10-25 NOTE — TELEPHONE ENCOUNTER
I called Lo to ask for the direct line to [facility]  following Jose Eduardo's case. Her name is Shaina Devi phone [phone].     I also called Shaina and left a message asking her to please call back to help clarify what we need to do to get the Emgality and Sumatriptan covered for Jose Eduardo.     Opal Burroughs RN

## 2019-10-25 NOTE — TELEPHONE ENCOUNTER
I updated pt mother that per [employer], [employer], and our PA team everything should be good for the pt to  the prescription.     Opal Burroughs RN

## 2019-10-25 NOTE — TELEPHONE ENCOUNTER
I left a message for Lo. I let her know we have not yet received the form from [facility] regarding Jose Eduardo's emgality PA. I gave her our correct fax number [phone]. I also gave her the clinic number to call us back with any additional questions.     Opal Burroughs RN

## 2019-10-25 NOTE — TELEPHONE ENCOUNTER
[facility] Call Center    Phone Message    May a detailed message be left on voicemail: yes    Reason for Call: Other: Mom called back - please try her back again.      Action Taken: Message routed to:  [facility] Neuro

## 2019-10-25 NOTE — TELEPHONE ENCOUNTER
I spoke with Lo. She said she called [facility] and they re-faxed the form. I notified our navigator team to look for this to come through. Myself or a member of navigation team will call her back to let her know if we have received.     Opal Burroughs RN

## 2019-12-02 ASSESSMENT — HEADACHE IMPACT TEST (HIT 6)
HOW OFTEN HAVE YOU FELT FED UP OR IRRITATED BECAUSE OF YOUR HEADACHES: ALWAYS
WHEN YOU HAVE A HEADACHE HOW OFTEN DO YOU WISH YOU COULD LIE DOWN: ALWAYS
HIT6 TOTAL SCORE: 70
HOW OFTEN DID HEADACHS LIMIT CONCENTRATION ON WORK OR DAILY ACTIVITY: VERY OFTEN
HOW OFTEN HAVE YOU FELT TOO TIRED TO WORK BECAUSE OF YOUR HEADACHES: VERY OFTEN
HOW OFTEN DO HEADACHES LIMIT YOUR DAILY ACTIVITIES: VERY OFTEN
WHEN YOU HAVE HEADACHES HOW OFTEN IS THE PAIN SEVERE: VERY OFTEN

## 2019-12-02 ASSESSMENT — ENCOUNTER SYMPTOMS
HYPERTENSION: 0
ORTHOPNEA: 0
SYNCOPE: 0
SLEEP DISTURBANCES DUE TO BREATHING: 0
EXERCISE INTOLERANCE: 1
PALPITATIONS: 1
LIGHT-HEADEDNESS: 1
LEG PAIN: 0
HYPOTENSION: 0

## 2019-12-05 ENCOUNTER — OFFICE VISIT (OUTPATIENT)
Dept: NEUROLOGY | Facility: CLINIC | Age: 24
End: 2019-12-05
Payer: COMMERCIAL

## 2019-12-05 VITALS
HEART RATE: 79 BPM | RESPIRATION RATE: 16 BRPM | SYSTOLIC BLOOD PRESSURE: 134 MMHG | DIASTOLIC BLOOD PRESSURE: 83 MMHG | BODY MASS INDEX: 28 KG/M2 | OXYGEN SATURATION: 98 % | HEIGHT: 71 IN | WEIGHT: 200 LBS

## 2019-12-05 DIAGNOSIS — G43.111 INTRACTABLE MIGRAINE WITH AURA WITH STATUS MIGRAINOSUS: ICD-10-CM

## 2019-12-05 RX ORDER — ALBUTEROL SULFATE 90 UG/1
1-2 AEROSOL, METERED RESPIRATORY (INHALATION) PRN
COMMUNITY
Start: 2019-11-13

## 2019-12-05 RX ORDER — CEFUROXIME AXETIL 250 MG/1
TABLET ORAL
Qty: 3 KIT | Refills: 11 | Status: SHIPPED | OUTPATIENT
Start: 2019-12-05 | End: 2020-02-18

## 2019-12-05 RX ORDER — LORAZEPAM 0.5 MG/1
0.5 TABLET ORAL PRN
COMMUNITY
Start: 2019-11-20

## 2019-12-05 ASSESSMENT — PAIN SCALES - GENERAL: PAINLEVEL: NO PAIN (0)

## 2019-12-05 ASSESSMENT — MIFFLIN-ST. JEOR: SCORE: 1914.32

## 2019-12-05 NOTE — LETTER
"12/5/2019       RE: Jose Eduardo Ryan  [address] [facility] [address]     Dear Colleague,    Thank you for referring your patient, Jose Eduardo Ryan, to the [facility] NEUROLOGY at [facility]. Please see a copy of my visit note below.    Re: Jose Eduardo Ryan      MRN# [id number]  YOB: 1995  Date of Visit:12/5/2019     OUTPATIENT NEUROLOGY VISIT NOTE    Reason for Visit:  Headache follow up    Interval History:  Jose Eduardo Ryan is a 24-year-old male presents to the clinic today for headache follow up.   Last Neurology Clinic follow up on 8/1/2019, see note for details and a trial of galcanezumab was recommended.   Today patient reports that Galcanezumab (Emgality) \"has been great\" and \"may be 4 migraine headaches\" per month since starting Emgality and headaches are not as severe as were before starting Emgality.   Acute migraine headache treatment -patient takes sumatriptan injections as needed and acute treatment works in 15 minutes to relieve his headache.   No side effects to Galcanezumab (Emgality) reported today.   Patient reports that he cut down on smoking to 2 cigarette per day which possibly helping with headaches prevention.   Otherwise no other new concerns were reported today.     Headache plan discussed with the patient  Continue Galcanezumab (Emgality) for migraine prevention and sumatriptan injections as needed  Follow up in 12 months if stable or sooner if needed    Past Medical History reviewed   Social History     Tobacco Use     Smoking status: Current Every Day Smoker     Packs/day: 0.30     Years: 2.00     Pack years: 0.60     Types: Cigarettes     Smokeless tobacco: Current User   Substance Use Topics     Alcohol use: No     Comment: rarely    reviewed and verified with the patient   No Known Allergies    Current Outpatient Medications   Medication Sig Dispense Refill     galcanezumab-gnlm (EMGALITY) 120 MG/ML injection Inject 2 mLs (240 mg) " Subcutaneous once for 1 dose 1 mL 0     galcanezumab-gnlm (EMGALITY) 120 MG/ML injection Inject 1 mL (120 mg) Subcutaneous every 28 days 1 mL 11     methylPREDNISolone (MEDROL DOSEPAK) 4 MG tablet therapy pack Follow Package Directions 21 tablet 0     methylPREDNISolone (MEDROL DOSEPAK) 4 MG tablet Follow package instructions 21 tablet 0     naproxen (NAPROXEN) 500 MG TBEC May take 1-2 tabs every 12 hours as needed. Limit use to no more than 2 days per week 30 tablet 3     promethazine (PHENERGAN) 25 MG Suppository Place 1 suppository (25 mg) rectally every 6 hours as needed for nausea 30 suppository 6     SUMAtriptan (IMITREX STATDOSE) 6 MG/0.5ML pen injector kit Inject 6 mg may repeat after 2 hours if needed; MAX 6 mg/dose and 12 mg/24 hours. Limit use to no more than two days per week. 1 kit 11     SUMAtriptan (IMITREX STATDOSE) 6 MG/0.5ML refill cartridge Inject 0.5 mLs (6 mg) Subcutaneous at onset of headache for migraine (may repeat in 2 hours as needed. Max 12 mg in 24 hours) 6 Cartridge 11     SUMAtriptan (IMITREX) 100 MG tablet Take 1 tablet by mouth as needed  2   reviewed and verified with the patient    Review of Systems:   A 10-point ROS including constitutional, eyes, respiratory, cardiovascular, gastroenterology, genitourinary, integumentary, musculoskeletal, neurology and psychiatric were all negative except as mentioned in the interval history.   Answers for HPI/ROS submitted by the patient on 12/2/2019   General Symptoms: No  Skin Symptoms: No  HENT Symptoms: No  EYE SYMPTOMS: No  HEART SYMPTOMS: Yes  LUNG SYMPTOMS: No  INTESTINAL SYMPTOMS: No  URINARY SYMPTOMS: No  REPRODUCTIVE SYMPTOMS: No  SKELETAL SYMPTOMS: No  BLOOD SYMPTOMS: No  NERVOUS SYSTEM SYMPTOMS: No  MENTAL HEALTH SYMPTOMS: No  Chest pain or pressure: No  Fast or irregular heartbeat: Yes intermittent and not new, no chest pain   Pain in legs with walking: No  Trouble breathing while lying down: No  Fingers or toes appear blue: No  High  "blood pressure: No  Low blood pressure: No  Fainting: No  Murmurs: No  Pacemaker: No  Varicose veins: No  Edema or swelling: No  Wake up at night with shortness of breath: No     General Exam:   /83 (BP Location: Right arm, Patient Position: Chair, Cuff Size: Adult Regular)   Pulse 79   Resp 16   Ht 1.803 m (5' 11\")   Wt 90.7 kg (200 lb)   SpO2 98%   BMI 27.89 kg/m     GEN: Awake, NAD; good eye contact, responses appropriately, healthy appearing. Speech is fluent without dysarthria. EOM intact. Face is symmetrical. Intact and symmetrical eyebrow and lid raise and eyelid closure, smiles. Hearing Intact to conversation speech. Strength intact in the upper and lower extremities bilaterally. Sensation is intact to  touch throughout.   Normal casual gait.    Assessment and Plan:  See Interval History for our discussion and plan  I discussed all my recommendation with Jose Eduardo Ryan. The patient verbalizes understanding and comfortable with the plan. The patient has our clinic phone number to call with any questions or concerns. All of the patient's questions were answered from the best of my current knowledge.     Time spent with pt answering questions, discussing findings, counseling and coordinating care was more than 50% the appointment time,  15 minutes.         BRANDI Ma, CNP  [facility]        Again, thank you for allowing me to participate in the care of your patient.      Sincerely,    BRANDI Partida CNP      "

## 2019-12-05 NOTE — PATIENT INSTRUCTIONS
Plan:  Continue Galcanezumab (Emgality) for migraine prevention and sumatriptan injections as needed  Follow up in 12 months if stable or sooner if needed

## 2019-12-05 NOTE — PROGRESS NOTES
"Re: Jose Eduardo Ryan      MRN# [id number]  YOB: 1995  Date of Visit:12/5/2019     OUTPATIENT NEUROLOGY VISIT NOTE    Reason for Visit:  Headache follow up    Interval History:  Jose Eduardo Ryan is a 24-year-old male presents to the clinic today for headache follow up.   Last Neurology Clinic follow up on 8/1/2019, see note for details and a trial of galcanezumab was recommended.   Today patient reports that Galcanezumab (Emgality) \"has been great\" and \"may be 4 migraine headaches\" per month since starting Emgality and headaches are not as severe as were before starting Emgality.   Acute migraine headache treatment -patient takes sumatriptan injections as needed and acute treatment works in 15 minutes to relieve his headache.   No side effects to Galcanezumab (Emgality) reported today.   Patient reports that he cut down on smoking to 2 cigarette per day which possibly helping with headaches prevention.   Otherwise no other new concerns were reported today.     Headache plan discussed with the patient  Continue Galcanezumab (Emgality) for migraine prevention and sumatriptan injections as needed  Follow up in 12 months if stable or sooner if needed    Past Medical History reviewed   Social History     Tobacco Use     Smoking status: Current Every Day Smoker     Packs/day: 0.30     Years: 2.00     Pack years: 0.60     Types: Cigarettes     Smokeless tobacco: Current User   Substance Use Topics     Alcohol use: No     Comment: rarely    reviewed and verified with the patient   No Known Allergies    Current Outpatient Medications   Medication Sig Dispense Refill     galcanezumab-gnlm (EMGALITY) 120 MG/ML injection Inject 2 mLs (240 mg) Subcutaneous once for 1 dose 1 mL 0     galcanezumab-gnlm (EMGALITY) 120 MG/ML injection Inject 1 mL (120 mg) Subcutaneous every 28 days 1 mL 11     methylPREDNISolone (MEDROL DOSEPAK) 4 MG tablet therapy pack Follow Package Directions 21 tablet 0     methylPREDNISolone (MEDROL " DOSEPAK) 4 MG tablet Follow package instructions 21 tablet 0     naproxen (NAPROXEN) 500 MG TBEC May take 1-2 tabs every 12 hours as needed. Limit use to no more than 2 days per week 30 tablet 3     promethazine (PHENERGAN) 25 MG Suppository Place 1 suppository (25 mg) rectally every 6 hours as needed for nausea 30 suppository 6     SUMAtriptan (IMITREX STATDOSE) 6 MG/0.5ML pen injector kit Inject 6 mg may repeat after 2 hours if needed; MAX 6 mg/dose and 12 mg/24 hours. Limit use to no more than two days per week. 1 kit 11     SUMAtriptan (IMITREX STATDOSE) 6 MG/0.5ML refill cartridge Inject 0.5 mLs (6 mg) Subcutaneous at onset of headache for migraine (may repeat in 2 hours as needed. Max 12 mg in 24 hours) 6 Cartridge 11     SUMAtriptan (IMITREX) 100 MG tablet Take 1 tablet by mouth as needed  2   reviewed and verified with the patient    Review of Systems:   A 10-point ROS including constitutional, eyes, respiratory, cardiovascular, gastroenterology, genitourinary, integumentary, musculoskeletal, neurology and psychiatric were all negative except as mentioned in the interval history.   Answers for HPI/ROS submitted by the patient on 12/2/2019   General Symptoms: No  Skin Symptoms: No  HENT Symptoms: No  EYE SYMPTOMS: No  HEART SYMPTOMS: Yes  LUNG SYMPTOMS: No  INTESTINAL SYMPTOMS: No  URINARY SYMPTOMS: No  REPRODUCTIVE SYMPTOMS: No  SKELETAL SYMPTOMS: No  BLOOD SYMPTOMS: No  NERVOUS SYSTEM SYMPTOMS: No  MENTAL HEALTH SYMPTOMS: No  Chest pain or pressure: No  Fast or irregular heartbeat: Yes intermittent and not new, no chest pain   Pain in legs with walking: No  Trouble breathing while lying down: No  Fingers or toes appear blue: No  High blood pressure: No  Low blood pressure: No  Fainting: No  Murmurs: No  Pacemaker: No  Varicose veins: No  Edema or swelling: No  Wake up at night with shortness of breath: No     General Exam:   /83 (BP Location: Right arm, Patient Position: Chair, Cuff Size: Adult  "Regular)   Pulse 79   Resp 16   Ht 1.803 m (5' 11\")   Wt 90.7 kg (200 lb)   SpO2 98%   BMI 27.89 kg/m    GEN: Awake, NAD; good eye contact, responses appropriately, healthy appearing. Speech is fluent without dysarthria. EOM intact. Face is symmetrical. Intact and symmetrical eyebrow and lid raise and eyelid closure, smiles. Hearing Intact to conversation speech. Strength intact in the upper and lower extremities bilaterally. Sensation is intact to  touch throughout.   Normal casual gait.    Assessment and Plan:  See Interval History for our discussion and plan  I discussed all my recommendation with Jose Eduardo Ryan. The patient verbalizes understanding and comfortable with the plan. The patient has our clinic phone number to call with any questions or concerns. All of the patient's questions were answered from the best of my current knowledge.     Time spent with pt answering questions, discussing findings, counseling and coordinating care was more than 50% the appointment time,  15 minutes.         BRANDI Ma, CNP  [facility]      "

## 2020-01-17 ENCOUNTER — TELEPHONE (OUTPATIENT)
Dept: NEUROLOGY | Facility: CLINIC | Age: 25
End: 2020-01-17

## 2020-01-17 NOTE — TELEPHONE ENCOUNTER
Health Call Center    Phone Message    May a detailed message be left on voicemail: yes    Reason for Call: Jelena suarez/ [employer] [place] reached out stating that with the new year the company that administers the medical insurance has switch pharmacy carriers, the [employer] with be needing a prior authorization for a quantity limit over ride on the sumatriptan.     The phone number to start the PA for [employer] is [phone]    Please call pt mother Lo with any further questions @ [phone]      Action Taken: Message routed to:  [facility]

## 2020-01-17 NOTE — TELEPHONE ENCOUNTER
Prior Authorization Retail Medication Request    Medication/Dose: SUMAtriptan (IMITREX STATDOSE) 6 MG/0.5ML pen injector kit  ICD code (if different than what is on RX):  G43.111  Previously Tried and Failed:  He was on amitriptyline, topiramate, valproate, verapamil in the past.   His past abortive therapies were with Relpax, Maxalt  Rationale:  Chronic migraine; 4 migraines a month with Emgality.  Acute migraine headache treatment -patient takes sumatriptan injections as needed and acute treatment works in 15 minutes to relieve his headache.     Insurance Name:  [facility]  Insurance ID:  [id number]       Pharmacy Information (if different than what is on RX)  Name:    Phone:

## 2020-01-17 NOTE — TELEPHONE ENCOUNTER
Prior Authorization Not Needed per Insurance    Medication: SUMAtriptan (IMITREX STATDOSE) 6 MG/0.5ML pen injector kit- PA Not Needed  Insurance Company: [employer] - Phone [phone] Fax [phone]  Expected CoPay:      Pharmacy Filling the Rx: [id number] - [place], [place] - [address] AT [address] [address] & [facility]  Pharmacy Notified: Yes  Patient Notified: No    Per pharmacy, they process script as #1 for 4 day supply and patient already picked up on 1/13/20. Pharmacy also stated patient has been picking up every 2 weeks. No PA needed.

## 2020-01-23 NOTE — TELEPHONE ENCOUNTER
Prior Authorization Approval (quantity limit)- via phone    Authorization Effective Date: 1/1/2020  Authorization Expiration Date: 1/22/2021  Medication: SUMAtriptan (IMITREX STATDOSE) 6 MG/0.5ML pen injector kit- APPROVED (Limit quantity)  Approved Dose/Quantity:   Reference #:     Insurance Company: [employer] - Phone [phone] Fax [phone]  Expected CoPay:       CoPay Card Available:      Foundation Assistance Needed:    Which Pharmacy is filling the prescription (Not needed for infusion/clinic administered): [id number] - [facility] [address] AT [address] [address] & [facility]  Pharmacy Notified: No-Insurance will be calling patient with approval. Per note below, patient picked up a 2 week supply. Patient should not be able to  1 month supply.  Patient Notified: No

## 2020-02-18 ENCOUNTER — TELEPHONE (OUTPATIENT)
Dept: NEUROLOGY | Facility: CLINIC | Age: 25
End: 2020-02-18

## 2020-02-18 DIAGNOSIS — G43.111 INTRACTABLE MIGRAINE WITH AURA WITH STATUS MIGRAINOSUS: ICD-10-CM

## 2020-02-18 RX ORDER — CEFUROXIME AXETIL 250 MG/1
TABLET ORAL
Qty: 6 KIT | Refills: 11 | Status: SHIPPED | OUTPATIENT
Start: 2020-02-18 | End: 2021-02-24

## 2020-02-18 NOTE — TELEPHONE ENCOUNTER
Prior Authorization Retail Medication Request    Medication/Dose: SUMAtriptan 6 MG/0.5ML SC pen injector kit;  Inject 6 mg may repeat after 2 hours if needed; MAX 6 mg/dose and 12 mg/24 hours. Limit use to no more than two days per week.    ICD code (if different than what is on RX):  G43.111  Previously Tried and Failed:  He was on amitriptyline, topiramate, valproate, verapamil in the past.   His past abortive therapies were with Relpax, Maxalt  Rationale:  Chronic migraine; 4 migraines a month with Emgality.  Acute migraine headache treatment -patient takes sumatriptan injections as needed and acute treatment works in 15 minutes to relieve his headache.      Insurance Name:  [facility]  Insurance ID:  [id number]         Pharmacy Information (if different than what is on RX)  Name:    Phone:

## 2020-02-18 NOTE — TELEPHONE ENCOUNTER
I called pt and left a voicemail that I had received his voicemail. I would like to know how many imitrex he is receiving per month now so I can write a quantity over ride for his insurance.     Opal COLMENARES

## 2020-02-18 NOTE — TELEPHONE ENCOUNTER
[facility] Call Center    Phone Message    May a detailed message be left on voicemail: yes     Reason for Call: Medication Question or concern regarding medication   Prescription Clarification  Name of Medication: SUMAtriptan (IMITREX STATDOSE) 6 MG/0.5ML pen injector kit- APPROVED (Limit quantity)  Prescribing Provider:    Pharmacy:  [id number] - [facility] [address] AT [address] [address] & [facility]   What on the order needs clarification? Pt is requesting authorization to  the same dose he use to before his insurance change - pt wants  3 boxes per 12 days          Action Taken: Message routed to:  [facility] neuro    Travel Screening: Not Applicable

## 2020-02-18 NOTE — TELEPHONE ENCOUNTER
Health Call Center    Phone Message    May a detailed message be left on voicemail: yes     Reason for Call: Other: Jose Eduardo returning Opal's call.  This writer tried connecting with the number that was given in Skype, but when called, it just produced a rapid busy signal.  Jose Eduardo did report that he currently is prescribed 2 for 30 days, but is requesting 3 for 12 days.  He states that quantity works perfect for him.  Please call him back to discuss further     Action Taken: Message routed to:  [facility]  Neurology    Travel Screening: Not Applicable

## 2020-02-19 DIAGNOSIS — G43.111 INTRACTABLE MIGRAINE WITH AURA WITH STATUS MIGRAINOSUS: Primary | ICD-10-CM

## 2020-02-19 RX ORDER — METHYLPREDNISOLONE 4 MG
TABLET, DOSE PACK ORAL
Qty: 21 TABLET | Refills: 0 | Status: SHIPPED | OUTPATIENT
Start: 2020-02-19 | End: 2020-06-04

## 2020-03-02 ENCOUNTER — HEALTH MAINTENANCE LETTER (OUTPATIENT)
Age: 25
End: 2020-03-02

## 2020-06-04 ENCOUNTER — TELEPHONE (OUTPATIENT)
Dept: NEUROLOGY | Facility: CLINIC | Age: 25
End: 2020-06-04

## 2020-06-04 ENCOUNTER — VIRTUAL VISIT (OUTPATIENT)
Dept: NEUROLOGY | Facility: CLINIC | Age: 25
End: 2020-06-04

## 2020-06-04 DIAGNOSIS — G43.111 INTRACTABLE MIGRAINE WITH AURA WITH STATUS MIGRAINOSUS: Primary | ICD-10-CM

## 2020-06-04 RX ORDER — PROPRANOLOL HCL 60 MG
60 CAPSULE, EXTENDED RELEASE 24HR ORAL AT BEDTIME
Qty: 30 CAPSULE | Refills: 6 | Status: SHIPPED | OUTPATIENT
Start: 2020-06-04 | End: 2024-02-13

## 2020-06-04 RX ORDER — METHYLPREDNISOLONE 4 MG
TABLET, DOSE PACK ORAL
Qty: 21 TABLET | Refills: 0 | Status: SHIPPED | OUTPATIENT
Start: 2020-06-04 | End: 2021-09-16

## 2020-06-04 NOTE — LETTER
6/4/2020        RE: Jose Eduardo Ryan  [address] [facility] [address]     Dear Colleague,    Thank you for referring your patient, Jose Eduardo Ryan, to the [facility] NEUROLOGY at [facility]. Please see a copy of my visit note below.    Jose Eduardo Ryan is a 25 year old male who is being evaluated via a billable telephone visit.        Phone call duration: 21 minutes    Start 13:43  End 14:05    Reason for visit:  Headache follow-up. This visit was conducted via synchronous telephone visit due to the current COVID-19 crisis to reduce patient risk.  Verbal consent was obtained.     Interval History:  The patient was seen by Dr. Harry in 08/2014 for an initial migraine headache evaluation.  The patient has also been seen at the [facility] in [place], the site of the [facility] in the past.  He was seen by a neurologist in [place], [place].    He has a history of headache since the age of 3.  Strong family history of migraine headache in his mother who lives in [place].  Dr. Harry referred the patient to Dr. Coy for Botox therapy which patient has been receiving since  2014.    He was on amitriptyline, topiramate, valproate, verapamil in the past.   His past abortive therapies were with Relpax, Maxalt, zolmitriptan and sumatriptan tablets -did not work  and injectable sumatriptan works but briefly.   Headache has increased in frequency as well as severity in the past 2-3 weeks. Patient reports that headaches localized above both eyes but either site, rates pain 5-10/10 on the numeric pain scale. Patient reports that headache comes back mid morning-mid afternoon. Patient denies any new symptoms. Associated dizziness severe and light sensitivity, nausea, vomiting at times. Headache worse with heat and humid and weather changes. Patient reports that he has been able to work but missed work -2 days in the past two  weeks. Patient reports that he has been tolerating fluids.   Patient reports that he took sumatriptan injectable and headaches has been re-occurring. Reports that he has been taking naproxen and promethazine suppositories. Promethazine helps with the nausea and vomiting.   Patient reports that he has been staying hydrated  Patient denies any fever or chills and no COVID 19 like symptoms.   Denies vision changes.   Denies any numbness or weakness in his legs or arms.   Patient reports that balance is Ok when he does not have a headache. Reports that he feels spinning when he has a headache.   Patient has been taking Galcanezumab (Emgality) for almost a year and it has been helpful until past 2-3 weeks with keeping patient's migraine under control.   Patient reports that his anxiety is high lately and increased blood pressure. Denies history of heart disease.     Allergies, current prescription medications reviewed    10 point ROS of systems including Constitutional, Eyes, Respiratory, Cardiovascular, Gastroenterology, Genitourinary, Integumentary, Muscularskeletal, Psychiatric were all negative today except for pertinent positives noted in interval history .    Exam Mentation appears normal, affect normal/bright, judgement and insight intact, normal speech     A/P:  Chronic migraine headache and acute headache for the past 2-3 weeks -possible acute intractable migraine flare up on the chronic migraine headache background and rebound headache due to sumatriptan  Discussed  Headache treatment plan    Plan:  Stay hydrated  Acute migraine treatment-  Stop sumatriptan   A trial of Nurtec Common side effects: Gastrointestinal: Nausea (2% Serious Immunologic: Hypersensitivity reaction (Less than 1% ) Take 75 mg orally per 24 hours placed on or under the tongue; MAX 75 mg/24 hours; the safety of treating more than 15 migraines/30 days has not been established   Medrol pack to help with intractable migraine acute treatment  and side effects discussed  Naproxen and promethazine as needed -no changes    Headaches, elevated blood pressure and anxiety- a trial of propranolol 60 mg ER -may cause fatigue, worsening of cold hands and feet, low blood pressure and heart rate, mood changes    Follow up in 3-4 weeks or sooner if needed    I spent a total of 23 minutes for telemedicine consult with Jose Eduardo Ryan during today s video meeting. Over 50% of this time was spent counseling the patient and/or coordinating care      BRANDI Ma [facility]  [facility]

## 2020-06-04 NOTE — PATIENT INSTRUCTIONS
Plan:  Stay hydrated  Acute migraine treatment-  Stop sumatriptan   A trial of Nurtec Common side effects: Gastrointestinal: Nausea (2% Serious Immunologic: Hypersensitivity reaction (Less than 1% ) Take 75 mg orally per 24 hours placed on or under the tongue; MAX 75 mg/24 hours; the safety of treating more than 15 migraines/30 days has not been established   Medrol pack to help with intractable migraine acute treatment   Naproxen and promethazine as needed -no changes    Headaches, elevated blood pressure and anxiety- a trial of propranolol 60 mg ER -may cause fatigue, worsening of cold hands and feet, low blood pressure and heart rate, mood changes    Follow up in 3-4 weeks or sooner if needed          Patient Education     Patient Education    Methylprednisolone Acetate Suspension for injection    Methylprednisolone Oral tablet    Methylprednisolone Sodium Succinate Solution for injection  Methylprednisolone Oral tablet  What is this medicine?  METHYLPREDNISOLONE (meth ill pred NISS oh lone) is a corticosteroid. It is commonly used to treat inflammation of the skin, joints, lungs, and other organs. Common conditions treated include asthma, allergies, and arthritis. It is also used for other conditions, such as blood disorders and diseases of the adrenal glands.  This medicine may be used for other purposes; ask your health care provider or pharmacist if you have questions.  What should I tell my health care provider before I take this medicine?  They need to know if you have any of these conditions:    Cushing's syndrome    diabetes    glaucoma    heart problems or disease    high blood pressure    infection such as herpes, measles, tuberculosis, or chickenpox    kidney disease    liver disease    mental problems    myasthenia gravis    osteoporosis    seizures    stomach ulcer or intestine disease including colitis and diverticulitis    thyroid problem    an unusual or allergic reaction to lactose,  methylprednisolone, other medicines, foods, dyes, or preservatives    pregnant or trying to get pregnant    breast-feeding  How should I use this medicine?  Take this medicine by mouth with a drink of water. Follow the directions on the prescription label. Take it with food or milk to avoid stomach upset. If you are taking this medicine once a day, take it in the morning. Do not take more medicine than you are told to take. Do not suddenly stop taking your medicine because you may develop a severe reaction. Your doctor will tell you how much medicine to take. If your doctor wants you to stop the medicine, the dose may be slowly lowered over time to avoid any side effects.  Talk to your pediatrician regarding the use of this medicine in children. Special care may be needed.  Overdosage: If you think you have taken too much of this medicine contact a poison control center or emergency room at once.  NOTE: This medicine is only for you. Do not share this medicine with others.  What if I miss a dose?  If you miss a dose, take it as soon as you can. If it is almost time for your next dose, talk to your doctor or health care professional. You may need to miss a dose or take an extra dose. Do not take double or extra doses without advice.  What may interact with this medicine?  Do not take this medicine with any of the following medications:    mifepristone  This medicine may also interact with the following medications:    tacrolimus    vaccines    warfarin  This list may not describe all possible interactions. Give your health care provider a list of all the medicines, herbs, non-prescription drugs, or dietary supplements you use. Also tell them if you smoke, drink alcohol, or use illegal drugs. Some items may interact with your medicine.  What should I watch for while using this medicine?  Visit your doctor or health care professional for regular checks on your progress. If you are taking this medicine for a long time,  carry an identification card with your name and address, the type and dose of your medicine, and your doctor's name and address.  The medicine may increase your risk of getting an infection. Stay away from people who are sick. Tell your doctor or health care professional if you are around anyone with measles or chickenpox.  If you are going to have surgery, tell your doctor or health care professional that you have taken this medicine within the last twelve months.  Ask your doctor or health care professional about your diet. You may need to lower the amount of salt you eat.  The medicine can increase your blood sugar. If you are a diabetic check with your doctor if you need help adjusting the dose of your diabetic medicine.  What side effects may I notice from receiving this medicine?  Side effects that you should report to your doctor or health care professional as soon as possible:    allergic reactions like skin rash, itching or hives, swelling of the face, lips, or tongue    eye pain, decreased or blurred vision, or bulging eyes    fever, sore throat, sneezing, cough, or other signs of infection, wounds that will not heal    increased thirst    mental depression, mood swings, mistaken feelings of self importance or of being mistreated    pain in hips, back, ribs, arms, shoulders, or legs    swelling of the ankles, feet, hands    trouble passing urine or change in the amount of urine  Side effects that usually do not require medical attention (report to your doctor or health care professional if they continue or are bothersome):    confusion, excitement, restlessness    headache    nausea, vomiting    skin problems, acne, thin and shiny skin    weight gain  This list may not describe all possible side effects. Call your doctor for medical advice about side effects. You may report side effects to FDA at 9-838-FDA-6589.  Where should I keep my medicine?  Keep out of the reach of children.  Store at room  temperature between 20 and 25 degrees C (68 and 77 degrees F). Throw away any unused medicine after the expiration date.  NOTE:This sheet is a summary. It may not cover all possible information. If you have questions about this medicine, talk to your doctor, pharmacist, or health care provider. Copyright  2016 Gold Standard           Patient Education     Propranolol tablets  Brand Name: Inderal  What is this medicine?  PROPRANOLOL (proe PRAN oh lole) is a beta-blocker. Beta-blockers reduce the workload on the heart and help it to beat more regularly. This medicine is used to treat high blood pressure, to control irregular heart rhythms (arrhythmias) and to relieve chest pain caused by angina. It may also be helpful after a heart attack. This medicine is also used to prevent migraine headaches, relieve uncontrollable shaking (tremors), and help certain problems related to the thyroid gland and adrenal gland.  How should I use this medicine?  Take this medicine by mouth with a glass of water. Follow the directions on the prescription label. Take your doses at regular intervals. Do not take your medicine more often than directed. Do not stop taking except on your the advice of your doctor or health care professional.  Talk to your pediatrician regarding the use of this medicine in children. Special care may be needed.  What side effects may I notice from receiving this medicine?  Side effects that you should report to your doctor or health care professional as soon as possible:    allergic reactions like skin rash, itching or hives, swelling of the face, lips, or tongue    breathing problems    changes in blood sugar    cold hands or feet    difficulty sleeping, nightmares    dry peeling skin    hallucinations    muscle cramps or weakness    slow heart rate    swelling of the legs and ankles    vomiting  Side effects that usually do not require medical attention (report to your doctor or health care professional if  they continue or are bothersome):    change in sex drive or performance    diarrhea    dry sore eyes    hair loss    nausea    weak or tired  What may interact with this medicine?  Do not take this medicine with any of the following medications:    feverfew    phenothiazines like chlorpromazine, mesoridazine, prochlorperazine, thioridazine  This medicine may also interact with the following medications:    aluminum hydroxide gel    antipyrine    antiviral medicines for HIV or AIDS    barbiturates like phenobarbital    certain medicines for blood pressure, heart disease, irregular heart beat    cimetidine    ciprofloxacin    diazepam    fluconazole    haloperidol    isoniazid    medicines for cholesterol like cholestyramine or colestipol    medicines for mental depression    medicines for migraine headache like almotriptan, eletriptan, frovatriptan, naratriptan, rizatriptan, sumatriptan, zolmitriptan    NSAIDs, medicines for pain and inflammation, like ibuprofen or naproxen    phenytoin    rifampin    teniposide    theophylline    thyroid medicines    tolbutamide    warfarin    zileuton  What if I miss a dose?  If you miss a dose, take it as soon as you can. If it is almost time for your next dose, take only that dose. Do not take double or extra doses.  Where should I keep my medicine?  Keep out of the reach of children.  Store at room temperature between 15 and 30 degrees C (59 and 86 degrees F). Protect from light. Throw away any unused medicine after the expiration date.  What should I tell my health care provider before I take this medicine?  They need to know if you have any of these conditions:    circulation problems or blood vessel disease    diabetes    history of heart attack or heart disease, vasospastic angina    kidney disease    liver disease    lung or breathing disease, like asthma or emphysema    pheochromocytoma    slow heart rate    thyroid disease    an unusual or allergic reaction to  propranolol, other beta-blockers, medicines, foods, dyes, or preservatives    pregnant or trying to get pregnant    breast-feeding  What should I watch for while using this medicine?  Visit your doctor or health care professional for regular check ups. Check your blood pressure and pulse rate regularly. Ask your health care professional what your blood pressure and pulse rate should be, and when you should contact them.  You may get drowsy or dizzy. Do not drive, use machinery, or do anything that needs mental alertness until you know how this drug affects you. Do not stand or sit up quickly, especially if you are an older patient. This reduces the risk of dizzy or fainting spells. Alcohol can make you more drowsy and dizzy. Avoid alcoholic drinks.  This medicine can affect blood sugar levels. If you have diabetes, check with your doctor or health care professional before you change your diet or the dose of your diabetic medicine.  Do not treat yourself for coughs, colds, or pain while you are taking this medicine without asking your doctor or health care professional for advice. Some ingredients may increase your blood pressure.  NOTE:This sheet is a summary. It may not cover all possible information. If you have questions about this medicine, talk to your doctor, pharmacist, or health care provider. Copyright  2019 [employer]

## 2020-06-04 NOTE — TELEPHONE ENCOUNTER
Prior Authorization Retail Medication Request    Medication/Dose: Rimegepant Sulfate 75 MG TBDP; Take 75 mg by mouth See Admin Instructions 75mg orally per 24 hours placed on or under the tongue; MAX 75 mg/24 hours - Oral    ICD code (if different than what is on RX):  G43.111  Previously Tried and Failed: emgality, sumatriptan, amitriptyline, topiramate, valproate, verapamil in the past.   His past abortive therapies were with Relpax, Maxalt and injectable sumatriptan.  Rationale:  Chronic migraine 15+/30 headache days a month    Insurance Name:  [facility]  Insurance ID:  [id number]       Pharmacy Information (if different than what is on RX)  Name:    Phone:

## 2020-06-04 NOTE — PROGRESS NOTES
"Jose Eduardo Ryan is a 25 year old male who is being evaluated via a billable telephone visit.      The patient has been notified of following:     \"This telephone visit will be conducted via a call between you and your physician/provider. We have found that certain health care needs can be provided without the need for a physical exam.  This service lets us provide the care you need with a short phone conversation.  If a prescription is necessary we can send it directly to your pharmacy.  If lab work is needed we can place an order for that and you can then stop by our lab to have the test done at a later time.    Telephone visits are billed at different rates depending on your insurance coverage. During this emergency period, for some insurers they may be billed the same as an in-person visit.  Please reach out to your insurance provider with any questions.    If during the course of the call the physician/provider feels a telephone visit is not appropriate, you will not be charged for this service.\"    Patient has given verbal consent for Telephone visit?  Yes    What phone number would you like to be contacted at? [phone]    How would you like to obtain your AVS? Kiera    Phone call duration: 21 minutes    Start 13:43  End 14:05    Reason for visit:  Headache follow-up. This visit was conducted via synchronous telephone visit due to the current COVID-19 crisis to reduce patient risk.  Verbal consent was obtained.     Interval History:  The patient was seen by Dr. Harry in 08/2014 for an initial migraine headache evaluation.  The patient has also been seen at the [facility] in [place], the site of the [facility] and [facility] in the past.  He was seen by a neurologist in [place], [place].    He has a history of headache since the age of 3.  Strong family history of migraine headache in his mother who lives in [place].  Dr. Harry referred the patient to " Dr. Coy for Botox therapy which patient has been receiving since  2014.    He was on amitriptyline, topiramate, valproate, verapamil in the past.   His past abortive therapies were with Relpax, Maxalt, zolmitriptan and sumatriptan tablets -did not work  and injectable sumatriptan works but briefly.   Headache has increased in frequency as well as severity in the past 2-3 weeks. Patient reports that headaches localized above both eyes but either site, rates pain 5-10/10 on the numeric pain scale. Patient reports that headache comes back mid morning-mid afternoon. Patient denies any new symptoms. Associated dizziness severe and light sensitivity, nausea, vomiting at times. Headache worse with heat and humid and weather changes. Patient reports that he has been able to work but missed work -2 days in the past two weeks. Patient reports that he has been tolerating fluids.   Patient reports that he took sumatriptan injectable and headaches has been re-occurring. Reports that he has been taking naproxen and promethazine suppositories. Promethazine helps with the nausea and vomiting.   Patient reports that he has been staying hydrated  Patient denies any fever or chills and no COVID 19 like symptoms.   Denies vision changes.   Denies any numbness or weakness in his legs or arms.   Patient reports that balance is Ok when he does not have a headache. Reports that he feels spinning when he has a headache.   Patient has been taking Galcanezumab (Emgality) for almost a year and it has been helpful until past 2-3 weeks with keeping patient's migraine under control.   Patient reports that his anxiety is high lately and increased blood pressure. Denies history of heart disease.     Allergies, current prescription medications reviewed    10 point ROS of systems including Constitutional, Eyes, Respiratory, Cardiovascular, Gastroenterology, Genitourinary, Integumentary, Muscularskeletal, Psychiatric were all negative today except  for pertinent positives noted in interval history .    Exam Mentation appears normal, affect normal/bright, judgement and insight intact, normal speech     A/P:  Chronic migraine headache and acute headache for the past 2-3 weeks -possible acute intractable migraine flare up on the chronic migraine headache background and rebound headache due to sumatriptan  Discussed  Headache treatment plan    Plan:  Stay hydrated  Acute migraine treatment-  Stop sumatriptan   A trial of Nurtec Common side effects: Gastrointestinal: Nausea (2% Serious Immunologic: Hypersensitivity reaction (Less than 1% ) Take 75 mg orally per 24 hours placed on or under the tongue; MAX 75 mg/24 hours; the safety of treating more than 15 migraines/30 days has not been established   Medrol pack to help with intractable migraine acute treatment and side effects discussed  Naproxen and promethazine as needed -no changes    Headaches, elevated blood pressure and anxiety- a trial of propranolol 60 mg ER -may cause fatigue, worsening of cold hands and feet, low blood pressure and heart rate, mood changes    Follow up in 3-4 weeks or sooner if needed    I spent a total of 23 minutes for telemedicine consult with Jose Eduardo Ryan during today s video meeting. Over 50% of this time was spent counseling the patient and/or coordinating care      BRANDI Ma [facility]  [facility]

## 2020-06-05 NOTE — TELEPHONE ENCOUNTER
Central Prior Authorization Team   Phone: [phone]          PA Initiation    Medication: Rimegepant Sulfate (NURTEC ODT) 75 MG TBDP   Insurance Company: [employer] [place] - Phone [phone] Fax [phone]  Pharmacy Filling the Rx: [id number] - [place], [place] - [address] AT [place] [address] & [facility]  Filling Pharmacy Phone: [phone]  Filling Pharmacy Fax: [phone]  Start Date: 6/5/2020

## 2020-06-05 NOTE — TELEPHONE ENCOUNTER
Prior Authorization Approval    Authorization Effective Date:    Authorization Expiration Date:    Medication: Rimegepant Sulfate (NURTEC ODT) 75 MG TBDP  Approved Dose/Quantity: 15 tabs/30 days  Reference #:     Insurance Company: [employer] [place] - Phone [phone] Fax [phone]    Which Pharmacy is filling the prescription (Not needed for infusion/clinic administered): [id number] [address] AT [address] [place] [address] & [facility]  Pharmacy Notified: Yes - via voicemail  Patient Notified:

## 2020-06-26 ENCOUNTER — TELEPHONE (OUTPATIENT)
Dept: NEUROLOGY | Facility: CLINIC | Age: 25
End: 2020-06-26

## 2020-06-26 NOTE — TELEPHONE ENCOUNTER
----- Message from BRANDI Partida CNP sent at 6/21/2020  9:25 PM CDT -----  Regarding: follow up  3-4 weeks follow up. Please help with scheduling. Thank you

## 2020-10-14 NOTE — NURSING NOTE
Chief Complaint   Patient presents with     RECHECK     [facility]- HEADACHES F/U     Quan Call, CMA     Positive

## 2020-12-20 ENCOUNTER — HEALTH MAINTENANCE LETTER (OUTPATIENT)
Age: 25
End: 2020-12-20

## 2021-02-24 DIAGNOSIS — G43.111 INTRACTABLE MIGRAINE WITH AURA WITH STATUS MIGRAINOSUS: ICD-10-CM

## 2021-02-24 RX ORDER — CEFUROXIME AXETIL 250 MG/1
TABLET ORAL
Qty: 6 ML | Refills: 9 | Status: SHIPPED | OUTPATIENT
Start: 2021-02-24 | End: 2021-11-03

## 2021-02-24 NOTE — TELEPHONE ENCOUNTER
Rx Authorization:    Requested Medication/ Dose SUMATRIPTAN 6MG/0.5 INJ 2X0.5ML    Date last refill ordered: 2/18/2020    Quantity ordered: 6 kit    # refills: 11    Date of last clinic visit with ordering provider: 6/4/2020    Date of next clinic visit with ordering provider:     All pertinent protocol data (lab date/result):     Include pertinent information from patients message:

## 2021-04-24 ENCOUNTER — HEALTH MAINTENANCE LETTER (OUTPATIENT)
Age: 26
End: 2021-04-24

## 2021-09-15 DIAGNOSIS — G43.111 INTRACTABLE MIGRAINE WITH AURA WITH STATUS MIGRAINOSUS: ICD-10-CM

## 2021-09-16 RX ORDER — METHYLPREDNISOLONE 4 MG
TABLET, DOSE PACK ORAL
Qty: 21 TABLET | Refills: 0 | Status: SHIPPED | OUTPATIENT
Start: 2021-09-16 | End: 2024-02-13

## 2021-10-03 ENCOUNTER — HEALTH MAINTENANCE LETTER (OUTPATIENT)
Age: 26
End: 2021-10-03

## 2021-11-03 DIAGNOSIS — G43.111 INTRACTABLE MIGRAINE WITH AURA WITH STATUS MIGRAINOSUS: ICD-10-CM

## 2021-11-03 RX ORDER — CEFUROXIME AXETIL 250 MG/1
TABLET ORAL
Qty: 6 ML | Refills: 9 | Status: SHIPPED | OUTPATIENT
Start: 2021-11-03 | End: 2022-07-18

## 2021-11-26 ENCOUNTER — HOSPITAL ENCOUNTER (EMERGENCY)
Facility: CLINIC | Age: 26
Discharge: HOME | End: 2021-11-26
Attending: EMERGENCY MEDICINE | Admitting: EMERGENCY MEDICINE
Payer: COMMERCIAL

## 2021-11-26 VITALS
OXYGEN SATURATION: 98 % | WEIGHT: 220 LBS | BODY MASS INDEX: 30.68 KG/M2 | HEART RATE: 96 BPM | RESPIRATION RATE: 16 BRPM | TEMPERATURE: 99.5 F | DIASTOLIC BLOOD PRESSURE: 73 MMHG | SYSTOLIC BLOOD PRESSURE: 119 MMHG

## 2021-11-26 DIAGNOSIS — G43.909 MIGRAINE WITHOUT STATUS MIGRAINOSUS, NOT INTRACTABLE, UNSPECIFIED MIGRAINE TYPE: ICD-10-CM

## 2021-11-26 LAB
ANION GAP SERPL CALCULATED.3IONS-SCNC: 5 MMOL/L (ref 3–14)
BASOPHILS # BLD AUTO: 0 10E3/UL (ref 0–0.2)
BASOPHILS NFR BLD AUTO: 0 %
BUN SERPL-MCNC: 12 MG/DL (ref 7–30)
CALCIUM SERPL-MCNC: 8.7 MG/DL (ref 8.5–10.1)
CHLORIDE BLD-SCNC: 108 MMOL/L (ref 94–109)
CO2 SERPL-SCNC: 26 MMOL/L (ref 20–32)
CREAT SERPL-MCNC: 0.87 MG/DL (ref 0.66–1.25)
EOSINOPHIL # BLD AUTO: 0 10E3/UL (ref 0–0.7)
EOSINOPHIL NFR BLD AUTO: 0 %
ERYTHROCYTE [DISTWIDTH] IN BLOOD BY AUTOMATED COUNT: 12.7 % (ref 10–15)
GFR SERPL CREATININE-BSD FRML MDRD: >90 ML/MIN/1.73M2
GLUCOSE BLD-MCNC: 101 MG/DL (ref 70–99)
HCT VFR BLD AUTO: 47.1 % (ref 40–53)
HGB BLD-MCNC: 15.8 G/DL (ref 13.3–17.7)
IMM GRANULOCYTES # BLD: 0 10E3/UL
IMM GRANULOCYTES NFR BLD: 0 %
LYMPHOCYTES # BLD AUTO: 1 10E3/UL (ref 0.8–5.3)
LYMPHOCYTES NFR BLD AUTO: 18 %
MCH RBC QN AUTO: 29.6 PG (ref 26.5–33)
MCHC RBC AUTO-ENTMCNC: 33.5 G/DL (ref 31.5–36.5)
MCV RBC AUTO: 88 FL (ref 78–100)
MONOCYTES # BLD AUTO: 0.7 10E3/UL (ref 0–1.3)
MONOCYTES NFR BLD AUTO: 13 %
NEUTROPHILS # BLD AUTO: 3.8 10E3/UL (ref 1.6–8.3)
NEUTROPHILS NFR BLD AUTO: 69 %
NRBC # BLD AUTO: 0 10E3/UL
NRBC BLD AUTO-RTO: 0 /100
PLATELET # BLD AUTO: 237 10E3/UL (ref 150–450)
POTASSIUM BLD-SCNC: 3.7 MMOL/L (ref 3.4–5.3)
RBC # BLD AUTO: 5.34 10E6/UL (ref 4.4–5.9)
SODIUM SERPL-SCNC: 139 MMOL/L (ref 133–144)
WBC # BLD AUTO: 5.5 10E3/UL (ref 4–11)

## 2021-11-26 PROCEDURE — 36415 COLL VENOUS BLD VENIPUNCTURE: CPT | Performed by: EMERGENCY MEDICINE

## 2021-11-26 PROCEDURE — 96361 HYDRATE IV INFUSION ADD-ON: CPT

## 2021-11-26 PROCEDURE — 87636 SARSCOV2 & INF A&B AMP PRB: CPT | Performed by: EMERGENCY MEDICINE

## 2021-11-26 PROCEDURE — 96375 TX/PRO/DX INJ NEW DRUG ADDON: CPT

## 2021-11-26 PROCEDURE — 99284 EMERGENCY DEPT VISIT MOD MDM: CPT | Mod: 25

## 2021-11-26 PROCEDURE — 250N000011 HC RX IP 250 OP 636: Performed by: EMERGENCY MEDICINE

## 2021-11-26 PROCEDURE — 80048 BASIC METABOLIC PNL TOTAL CA: CPT | Performed by: EMERGENCY MEDICINE

## 2021-11-26 PROCEDURE — 96374 THER/PROPH/DIAG INJ IV PUSH: CPT

## 2021-11-26 PROCEDURE — C9803 HOPD COVID-19 SPEC COLLECT: HCPCS

## 2021-11-26 PROCEDURE — 85025 COMPLETE CBC W/AUTO DIFF WBC: CPT | Performed by: EMERGENCY MEDICINE

## 2021-11-26 PROCEDURE — 258N000003 HC RX IP 258 OP 636: Performed by: EMERGENCY MEDICINE

## 2021-11-26 RX ORDER — METOCLOPRAMIDE HYDROCHLORIDE 5 MG/ML
10 INJECTION INTRAMUSCULAR; INTRAVENOUS ONCE
Status: COMPLETED | OUTPATIENT
Start: 2021-11-26 | End: 2021-11-26

## 2021-11-26 RX ORDER — KETOROLAC TROMETHAMINE 15 MG/ML
15 INJECTION, SOLUTION INTRAMUSCULAR; INTRAVENOUS ONCE
Status: COMPLETED | OUTPATIENT
Start: 2021-11-26 | End: 2021-11-26

## 2021-11-26 RX ORDER — ONDANSETRON 4 MG/1
4 TABLET, ORALLY DISINTEGRATING ORAL EVERY 8 HOURS PRN
Qty: 10 TABLET | Refills: 0 | Status: SHIPPED | OUTPATIENT
Start: 2021-11-26 | End: 2021-11-29

## 2021-11-26 RX ORDER — DIPHENHYDRAMINE HYDROCHLORIDE 50 MG/ML
25 INJECTION INTRAMUSCULAR; INTRAVENOUS ONCE
Status: COMPLETED | OUTPATIENT
Start: 2021-11-26 | End: 2021-11-26

## 2021-11-26 RX ADMIN — METOCLOPRAMIDE HYDROCHLORIDE 10 MG: 5 INJECTION INTRAMUSCULAR; INTRAVENOUS at 22:11

## 2021-11-26 RX ADMIN — KETOROLAC TROMETHAMINE 15 MG: 15 INJECTION, SOLUTION INTRAMUSCULAR; INTRAVENOUS at 22:11

## 2021-11-26 RX ADMIN — DIPHENHYDRAMINE HYDROCHLORIDE 25 MG: 50 INJECTION INTRAMUSCULAR; INTRAVENOUS at 22:10

## 2021-11-26 RX ADMIN — SODIUM CHLORIDE 1000 ML: 9 INJECTION, SOLUTION INTRAVENOUS at 22:11

## 2021-11-27 LAB
FLUAV RNA SPEC QL NAA+PROBE: NEGATIVE
FLUBV RNA RESP QL NAA+PROBE: NEGATIVE
SARS-COV-2 RNA RESP QL NAA+PROBE: NEGATIVE

## 2021-11-27 ASSESSMENT — ENCOUNTER SYMPTOMS
SHORTNESS OF BREATH: 0
HEADACHES: 1
VOMITING: 1
NAUSEA: 1
DIARRHEA: 0
NECK PAIN: 0
NUMBNESS: 0
CHILLS: 1
ABDOMINAL PAIN: 0
COUGH: 0
NECK STIFFNESS: 0
CONFUSION: 0
EYE PAIN: 0
FEVER: 1
WEAKNESS: 0

## 2021-11-27 NOTE — ED PROVIDER NOTES
History     Chief Complaint:  Fever and Headache       HPI   Jose Eduardo Ryan is a 26 year old male who presents for evaluation of a headache, nausea, and vomiting and fever.  He has had a significant history of migraine headaches and has near daily headaches.  The headache has been worsening over the last few days he has been having nausea vomiting and nausea.  The headache is located just above his right eye.  He has blurred vision in his right eye.  These are all typical migraine symptoms and are not out of ordinary.  No trauma to the head.  He reports a fever at home.  No cough or other respiratory symptoms.  No abdominal pain or diarrhea.  He has no known sick contacts.  He has been vaccinated against COVID-19.    ROS:  Review of Systems   Constitutional: Positive for chills and fever.   HENT: Negative for congestion.    Eyes: Positive for visual disturbance. Negative for pain.   Respiratory: Negative for cough and shortness of breath.    Gastrointestinal: Positive for nausea and vomiting. Negative for abdominal pain and diarrhea.   Musculoskeletal: Negative for neck pain and neck stiffness.   Neurological: Positive for headaches. Negative for weakness and numbness.   Psychiatric/Behavioral: Negative for confusion.   All other systems reviewed and are negative.       Allergies:  No Known Allergies     Medications:    albuterol (PROAIR HFA/PROVENTIL HFA/VENTOLIN HFA) 108 (90 Base) MCG/ACT inhaler  galcanezumab-gnlm (EMGALITY) 120 MG/ML injection  LORazepam (ATIVAN) 0.5 MG tablet  methylPREDNISolone (MEDROL DOSEPAK) 4 MG tablet therapy pack  naproxen (NAPROXEN) 500 MG TBEC  promethazine (PHENERGAN) 25 MG Suppository  propranolol ER (INDERAL LA) 60 MG 24 hr capsule  Rimegepant Sulfate 75 MG TBDP  SUMAtriptan (IMITREX STATDOSE) 6 MG/0.5ML pen injector kit        Past Medical History:    Past Medical History:   Diagnosis Date     Migraine headache      Mild intermittent asthma      Tobacco use      Patient Active  Problem List   Diagnosis     Migraine headache     Mild intermittent asthma        Past Surgical History:    Past Surgical History:   Procedure Laterality Date     TONSILLECTOMY & ADENOIDECTOMY          Family History:    family history includes C.A.D. in his father; Neurologic Disorder in his mother.    Social History:   reports that he has been smoking cigarettes. He has a 0.60 pack-year smoking history. He uses smokeless tobacco. He reports current drug use. Drug: Marijuana. He reports that he does not drink alcohol.  [facility]     Physical Exam     Patient Vitals for the past 24 hrs:   BP Temp Temp src Pulse Resp SpO2 Weight   11/26/21 2219 124/77 99.5  F (37.5  C) Oral -- 16 98 % --   11/26/21 2120 (!) 153/93 97.7  F (36.5  C) Temporal 112 18 97 % 99.8 kg (220 lb)        Physical Exam  Constitutional: Well appearing.  HEENT: Atraumatic.  PERRL.  EOMI.  Moist mucous membranes.  Neck: Soft.  Supple.   Cardiac: Regular rate and rhythm.  No murmur or rub.  Respiratory: Clear to auscultation bilaterally.  No respiratory distress.   Abdomen: Soft and nontender. Nondistended.  Musculoskeletal: No edema.  Normal range of motion.  Neurologic: Alert and oriented x3.  Normal tone and bulk.  No facial drooping.  Normal speech.  5/5 strength in bilateral upper and lower extremities.  Sensation to light touch intact throughout.  Normal gait.  Skin: No rashes.  No edema.  Psych: Normal affect.  Normal behavior.      Emergency Department Course   Laboratory:  Labs Ordered and Resulted from Time of ED Arrival to Time of ED Departure   CBC WITH PLATELETS AND DIFFERENTIAL       Result Value    WBC Count 5.5      RBC Count 5.34      Hemoglobin 15.8      Hematocrit 47.1      MCV 88      MCH 29.6      MCHC 33.5      RDW 12.7      Platelet Count 237      % Neutrophils 69      % Lymphocytes 18      % Monocytes 13      % Eosinophils 0      % Basophils 0      % Immature Granulocytes 0      NRBCs per 100 WBC 0       Absolute Neutrophils 3.8      Absolute Lymphocytes 1.0      Absolute Monocytes 0.7      Absolute Eosinophils 0.0      Absolute Basophils 0.0      Absolute Immature Granulocytes 0.0      Absolute NRBCs 0.0     BASIC METABOLIC PANEL        Procedures       Emergency Department Course:  Reviewed:  I reviewed nursing notes, vitals and past medical history    Interventions:  Medications   0.9% sodium chloride BOLUS (1,000 mLs Intravenous New Bag 11/26/21 2211)   metoclopramide (REGLAN) injection 10 mg (10 mg Intravenous Given 11/26/21 2211)   diphenhydrAMINE (BENADRYL) injection 25 mg (25 mg Intravenous Given 11/26/21 2210)   ketorolac (TORADOL) injection 15 mg (15 mg Intravenous Given 11/26/21 2211)        Disposition:  The patient was discharged to home.     Impression & Plan    Covid-19  Jose Eduardo Ryan was evaluated during a global COVID-19 pandemic, which necessitated consideration that the patient might be at risk for infection with the SARS-CoV-2 virus that causes COVID-19.   Applicable protocols for evaluation were followed during the patient's care.   COVID-19 was considered as part of the patient's evaluation. The plan for testing is:  a test was obtained during this visit.    Medical Decision Making:  Jose Eduardo Ryan is a 26-year-old man who is afebrile and hemodynamically stable.  His neurologic exam is without acute focal neurologic deficits.  He has no fever or meningismus.  My concern for meningitis/encephalitis is exceedingly low at this time given his well appearance and lack of fever.  His headache greatly improved with the above migraine cocktail.  His headaches seem very typical of his migraines.  I do not believe any imaging is indicated.  He is tolerating p.o. intake and will go home with p.o. Zofran.  He feels comfortable discharging at this time.  We will send off a Covid test given his reported subjective fevers at home.  We discussed supportive care at home and return precautions were  given.  He is in agreement with plan his questions were answered.  He is in no distress at time of discharge.    Diagnosis:    ICD-10-CM    1. Migraine without status migrainosus, not intractable, unspecified migraine type  G43.909         Discharge Medications:  Discharge Medication List as of 11/26/2021 10:57 PM      START taking these medications    Details   ondansetron (ZOFRAN ODT) 4 MG ODT tab Take 1 tablet (4 mg) by mouth every 8 hours as needed for nausea or vomiting, Disp-10 tablet, R-0, E-Prescribe              11/26/2021   Erich Saunders MD Salay, Nicholas J, MD  11/27/21 6125

## 2022-04-30 NOTE — TELEPHONE ENCOUNTER
"Received message from Iwona Galeano RN in PMR. Patient's mother trying to reach Tanna to discuss options for patient's headache. Mother stated that patient's headache is severe and he is throwing up as well.    Spoke to mother, Lo, and informed her that I cannot talk about the patient's care with her as there is no authorization to discuss PHI documented. She can give me any information however.    Lo states that over the weekend, patient had a \"couple of bad ones\" (headaches). He took 2 Imitrex. This morning had another one, took another Imitrex.  He is in a bad run, she is wondering if it is allergy season or the weather. She stated that Tanna once gave him a Prednisone taper which broke him out of the cycle of headaches and she is wondering if Tanna would do this again or make another recommendation. Will forward message to Tanna and call patient after hearing from her.    " Background information:     Sayra Bernabe is a 73 year old patient with a PMH significant for HTN, HLD, seizure disorder, retroperitoneal hematom, migraines and CKD.  Patient admitted on 4/28 for complaints of back pain.  On 4/29 patient became altered a code R was initiated.  At that time she was worked up for sepsis and started on antibiotics.  This morning a code stroke was called as patient was experiencing bilateral weakness on upper and lower extremities and noted to have expressive and receptive aphasia.  NIHSS 13 at that time.  Glucose 106.  Patient brought to stat CT head noncontrast which was negative for in acute intracranial process.  Patient was later evaluated by telestroke patient nonfocal exam.  Patient not a candidate for tPA as she recently had a major hemorrhage a few days ago and was outside of the window therapy.     Sodium (mmol/L)   Date Value   04/30/2022 140     Potassium (mmol/L)   Date Value   04/30/2022 3.6     Chloride (mmol/L)   Date Value   04/30/2022 106     Glucose (mg/dL)   Date Value   04/30/2022 110 (H)     Calcium (mg/dL)   Date Value   04/30/2022 8.5     Carbon Dioxide (mmol/L)   Date Value   04/30/2022 23     BUN (mg/dL)   Date Value   04/30/2022 17     Creatinine (mg/dL)   Date Value   04/30/2022 0.82     WBC (K/mcL)   Date Value   04/30/2022 8.6     RBC (mil/mcL)   Date Value   04/30/2022 3.49 (L)     HCT (%)   Date Value   04/30/2022 33.4 (L)     HGB (g/dL)   Date Value   04/30/2022 11.0 (L)     PLT (K/mcL)   Date Value   04/30/2022 223       Visit Vitals  /65   Pulse 73   Temp 98.2 °F (36.8 °C) (Oral)   Resp (!) 24   Ht 5' 4\" (1.626 m)   Wt 85 kg (187 lb 6.3 oz)   SpO2 95%   BMI 32.17 kg/m²       NIH Stroke Scale 12 (04/30/22 0930)    Assessment Interval  Additional   Level of Consciousness 0 Alert   LOC Questions 0 Answers both questions correctly   LOC Commands 0 Performs both tasks correctly   Best Gaze 0 Normal   Visual 0 No visual loss   Facial Palsy 0 Normal    Motor Arm-Left 3 No effort against gravity   Motor Arm-Right 3 No effort against gravity   Motor Leg-Left 3 No effort against gravity   Motor Leg-Right 2 Some effort against gravity   Limb Ataxia 0 Absent   Sensory 0 Normal   Best Language 1 Mild to moderate aphasia   Dysarthria 0 Normal articulation   Extinction and Inattention 0 No abnormality   NIHSS Score 12        Recommendations:    -Continue infectious work-up  -Agree with inpatient neurology consult  -Return to medical floor      MITZY Henry-BC  Critical Care Nurse Practitioner     This case was discussed with Dr. JAIME Sanderson  who was my collaborating physician during the time of this encounter.     Date of Service: 4/30/2022     Speech recognition software was used in the preparation of this note, errors in transcription may be present. Please call/page if there are questions.

## 2022-05-15 ENCOUNTER — HEALTH MAINTENANCE LETTER (OUTPATIENT)
Age: 27
End: 2022-05-15

## 2022-07-18 DIAGNOSIS — G43.111 INTRACTABLE MIGRAINE WITH AURA WITH STATUS MIGRAINOSUS: ICD-10-CM

## 2022-07-18 RX ORDER — CEFUROXIME AXETIL 250 MG/1
TABLET ORAL
Qty: 6 ML | Refills: 11 | Status: SHIPPED | OUTPATIENT
Start: 2022-07-18 | End: 2023-02-03

## 2022-09-10 ENCOUNTER — HEALTH MAINTENANCE LETTER (OUTPATIENT)
Age: 27
End: 2022-09-10

## 2022-10-12 ENCOUNTER — TELEPHONE (OUTPATIENT)
Dept: NEUROLOGY | Facility: CLINIC | Age: 27
End: 2022-10-12

## 2022-10-12 ENCOUNTER — VIRTUAL VISIT (OUTPATIENT)
Dept: NEUROLOGY | Facility: CLINIC | Age: 27
End: 2022-10-12
Payer: COMMERCIAL

## 2022-10-12 DIAGNOSIS — G43.709 CHRONIC MIGRAINE WITHOUT AURA WITHOUT STATUS MIGRAINOSUS, NOT INTRACTABLE: Primary | ICD-10-CM

## 2022-10-12 PROCEDURE — 99202 OFFICE O/P NEW SF 15 MIN: CPT | Mod: 95 | Performed by: NURSE PRACTITIONER

## 2022-10-12 ASSESSMENT — HEADACHE IMPACT TEST (HIT 6)
HIT6 TOTAL SCORE: 65
HOW OFTEN HAVE YOU FELT FED UP OR IRRITATED BECAUSE OF YOUR HEADACHES: VERY OFTEN
WHEN YOU HAVE HEADACHES HOW OFTEN IS THE PAIN SEVERE: VERY OFTEN
HOW OFTEN DO HEADACHES LIMIT YOUR DAILY ACTIVITIES: SOMETIMES
HOW OFTEN HAVE YOU FELT TOO TIRED TO WORK BECAUSE OF YOUR HEADACHES: SOMETIMES
WHEN YOU HAVE A HEADACHE HOW OFTEN DO YOU WISH YOU COULD LIE DOWN: ALWAYS
HOW OFTEN DID HEADACHS LIMIT CONCENTRATION ON WORK OR DAILY ACTIVITY: SOMETIMES

## 2022-10-12 ASSESSMENT — MIGRAINE DISABILITY ASSESSMENT (MIDAS)
HOW MANY DAYS DID YOU NOT DO HOUSEWORK BECAUSE OF HEADACHES: 5
HOW MANY DAYS DID YOU MISS WORK OR SCHOOL BECAUSE OF HEADACHES: 2
ON A SCALE FROM 0-10 ON AVERAGE HOW PAINFUL WERE HEADACHES: 9
HOW MANY DAYS WAS YOUR PRODUCTIVITY CUT IN HALF BECAUSE OF HEADACHES: 6
HOW MANY DAYS IN THE PAST 3 MONTHS HAVE YOU HAD A HEADACHE: 30
TOTAL SCORE: 23
HOW MANY DAYS WAS HOUSEWORK PRODUCTIVITY CUT IN HALF DUE TO HEADACHES: 8
HOW OFTEN WERE SOCIAL ACTIVITIES MISSED DUE TO HEADACHES: 2

## 2022-10-12 NOTE — PATIENT INSTRUCTIONS
Plan:  Prevention-reinstate Emgality   Rescue Tx-sumatriptan injections and/or Nurtec as needed   Follow up in 6 months or sooner if needed

## 2022-10-12 NOTE — TELEPHONE ENCOUNTER
Prior Authorization Retail Medication Request    Medication/Dose:   ICD code (if different than what is on RX):    Previously Tried and Failed: He was on amitriptyline, topiramate, valproate, verapamil in the past.   His past abortive therapies were with Relpax, Maxalt, zolmitriptan and sumatriptan tablets -did not work  and injectable sumatriptan works but briefly  Headaches -2-15/month and duration 30 minutes with sumatriptan injection and if does get it on time -a day   Nurtec as needed and helps. Wishes to have a good preventative.   Emgality stopped 1.5 years ago and was working and would like to try it again -it was very effective and no side effects.        --          Rationale:  Migraine     Insurance Name:  [facility]  Insurance ID: [id number]       Pharmacy Information (if different than what is on RX)  Name:    Phone:

## 2022-10-12 NOTE — LETTER
10/12/2022       RE: Jose Eduardo Ryan  [address] [facility] [address]     Dear Colleague,    Thank you for referring your patient, Jose Eduardo Ryan, to the [facility] [place] at [facility]. Please see a copy of my visit note below.    Jose Eduardo is a 27 year old who is being evaluated via a billable video visit.      How would you like to obtain your AVS? MyChart  If the video visit is dropped, the invitation should be resent by: Text to cell phone: [phone]  Will anyone else be joining your video visit? No        Video-Visit Details    Video Start Time: 2:07 PM    Type of service:  Video Visit    Video End Time:2:21 PM    Originating Location (pt. Location): Home    Distant Location (provider location):  [facility] [place]     Platform used for Video Visit: [facility] follow up:  Interval History:  The patient was seen by Dr. Harry in 08/2014 for an initial migraine headache evaluation.  The patient has also been seen at the [facility] in [place], the site of the [place] [facility] in the past.  He was seen by a neurologist in [place], [place].    He has a history of headache since the age of 3.  Strong family history of migraine headache in his mother who lives in [place].  Dr. Harry referred the patient to Dr. Coy for Botox therapy which patient has been receiving since  2014.    He was on amitriptyline, topiramate, valproate, verapamil in the past.   His past abortive therapies were with Relpax, Maxalt, zolmitriptan and sumatriptan tablets -did not work  and injectable sumatriptan works but briefly.     Last visit 6/4/2020-note reviewed   Headaches -2-15/month and duration 30 minutes with sumatriptan injection and if does get it on time -a day   Nurtec as needed and helps. Wishes to have a good preventative.    Emgality stopped 1.5 years ago and was working and would like to try it again -it was very effective and no side effects.     Plan:  Prevention-reinstate Emgality and side effects reviewed  Rescue Tx-sumatriptan injections and/or Nurtec as needed -limit use to no more than 9 days per month  Follow up in 6 months or sooner if needed     Patient is alert and no in apparent acute distress,  mentation appears normal, judgement and insight intact, normal speech.    I discussed all my recommendations with Jose Eduardo Ryan who verbalizes understanding and comfortable with the plan.  All of patient's questions were answered from the best of my knowledge.  Patient is in agreement with the plan.     17 minutes spent on the date of the encounter doing video access, chart  review,   meds review, treatment plan, documentation and further activities as noted above    BRANDI Ma, CNP [facility]  Headache certified  [facility]            Again, thank you for allowing me to participate in the care of your patient.      Sincerely,    BRANDI Partida CNP

## 2022-10-12 NOTE — LETTER
Date:October 17, 2022      Provider requested that no letter be sent. Do not send.       [facility]

## 2022-10-12 NOTE — PROGRESS NOTES
Jose Eduardo is a 27 year old who is being evaluated via a billable video visit.      How would you like to obtain your AVS? MyChart  If the video visit is dropped, the invitation should be resent by: Text to cell phone: [phone]  Will anyone else be joining your video visit? No        Video-Visit Details    Video Start Time: 2:07 PM    Type of service:  Video Visit    Video End Time:2:21 PM    Originating Location (pt. Location): Home    Distant Location (provider location):  [facility] [place]     Platform used for Video Visit: [place]     [facility] follow up:  Interval History:  The patient was seen by Dr. Harry in 08/2014 for an initial migraine headache evaluation.  The patient has also been seen at the [facility] in [place], the site of the [place] [facility] in the past.  He was seen by a neurologist in [place], [place].    He has a history of headache since the age of 3.  Strong family history of migraine headache in his mother who lives in [place].  Dr. Harry referred the patient to Dr. Coy for Botox therapy which patient has been receiving since  2014.    He was on amitriptyline, topiramate, valproate, verapamil in the past.   His past abortive therapies were with Relpax, Maxalt, zolmitriptan and sumatriptan tablets -did not work  and injectable sumatriptan works but briefly.     Last visit 6/4/2020-note reviewed   Headaches -2-15/month and duration 30 minutes with sumatriptan injection and if does get it on time -a day   Nurtec as needed and helps. Wishes to have a good preventative.   Emgality stopped 1.5 years ago and was working and would like to try it again -it was very effective and no side effects.     Plan:  Prevention-reinstate Emgality and side effects reviewed  Rescue Tx-sumatriptan injections and/or Nurtec as needed -limit use to no more than 9 days per month  Follow up in 6 months or sooner  if needed     Patient is alert and no in apparent acute distress,  mentation appears normal, judgement and insight intact, normal speech.    I discussed all my recommendations with Jose Eduardo Ryan who verbalizes understanding and comfortable with the plan.  All of patient's questions were answered from the best of my knowledge.  Patient is in agreement with the plan.     17 minutes spent on the date of the encounter doing video access, chart  review,   meds review, treatment plan, documentation and further activities as noted above    BRANDI Ma, CNP [facility]  Headache certified  [facility]

## 2022-10-13 NOTE — TELEPHONE ENCOUNTER
Prior Authorization Approval    Authorization Effective Date: 10/13/2022  Authorization Expiration Date: 4/13/2023  Medication: galcanezumab-gnlm (EMGALITY) 120 MG/ML injection 2 mL 11 10/12/2022  --  Sig: Inject 240 mg subcutaneous first month and then inject 120 mg subcutaneous every 28 days  Approved Dose/Quantity: 2ml per 30 days, then 1ml per 28 days  Reference #: [id number]   Insurance Company: [facility] - Phone [phone] Fax [phone]  Expected CoPay:        Which Pharmacy is filling the prescription (Not needed for infusion/clinic administered): [id number] [id number] AT [place] [address] & [facility]  Pharmacy Notified: Yes  Patient Notified: No

## 2022-10-13 NOTE — TELEPHONE ENCOUNTER
Central Prior Authorization Team   Phone: [phone]      PA Initiation    Medication: galcanezumab-gnlm (EMGALITY) 120 MG/ML injection 2 mL 11 10/12/2022  --  Sig: Inject 240 mg subcutaneous first month and then inject 120 mg subcutaneous every 28 days  Insurance Company: [employer] [place] - Phone [phone] Fax [phone]  Pharmacy Filling the Rx: [id number] [id number] AT [address] [place] [address] & [facility]  Filling Pharmacy Phone: [phone]  Filling Pharmacy Fax:    Start Date: 10/13/2022

## 2023-01-26 ENCOUNTER — TELEPHONE (OUTPATIENT)
Dept: NEUROLOGY | Facility: CLINIC | Age: 28
End: 2023-01-26

## 2023-01-26 NOTE — TELEPHONE ENCOUNTER
Prior Authorization Renewal Retail Medication Request     Medication/Dose: SUMAtriptan 6 MG/0.5ML SC pen injector kit;  Inject 6 mg may repeat after 2 hours if needed; MAX 6 mg/dose and 12 mg/24 hours. Limit use to no more than two days per week.     ICD code (if different than what is on RX):  G43.111  Previously Tried and Failed:  He was on amitriptyline, topiramate, valproate, verapamil in the past.   His past abortive therapies were with Relpax, Maxalt  Rationale:  Chronic migraine; 4 migraines a month with Emgality.  Acute migraine headache treatment -patient takes sumatriptan injections as needed and acute treatment works in 15 minutes to relieve his headache.      Insurance Name:  [facility]  Insurance ID:  [id number]         Pharmacy Information (if different than what is on RX)  Name:    Phone:

## 2023-01-29 NOTE — TELEPHONE ENCOUNTER
Prior Authorization Not Needed per Insurance    Medication: SUMAtriptan (IMITREX STATDOSE) 6 MG/0.5ML pen injector kit  Insurance Company: [employer] - Phone [phone] Fax [phone]  Expected CoPay:      Pharmacy [facility]illing the Rx: [id number] [address] [address] AT [address] [address] & [facility]  Pharmacy Notified: Yes  Patient Notified: No    [facility] stated that script was transferred to [place] pharmacy [phone]. Called [place] and they stated they were able to get a paid claim.

## 2023-02-03 ENCOUNTER — MYC MEDICAL ADVICE (OUTPATIENT)
Dept: NEUROLOGY | Facility: CLINIC | Age: 28
End: 2023-02-03
Payer: COMMERCIAL

## 2023-02-03 DIAGNOSIS — G43.111 INTRACTABLE MIGRAINE WITH AURA WITH STATUS MIGRAINOSUS: ICD-10-CM

## 2023-02-03 RX ORDER — CEFUROXIME AXETIL 250 MG/1
TABLET ORAL
Qty: 6 ML | Refills: 11 | Status: SHIPPED | OUTPATIENT
Start: 2023-02-03 | End: 2024-02-13

## 2023-06-03 ENCOUNTER — HEALTH MAINTENANCE LETTER (OUTPATIENT)
Age: 28
End: 2023-06-03

## 2024-02-12 ENCOUNTER — MYC REFILL (OUTPATIENT)
Dept: NEUROLOGY | Facility: CLINIC | Age: 29
End: 2024-02-12
Payer: COMMERCIAL

## 2024-02-12 DIAGNOSIS — G43.111 INTRACTABLE MIGRAINE WITH AURA WITH STATUS MIGRAINOSUS: ICD-10-CM

## 2024-02-12 RX ORDER — CEFUROXIME AXETIL 250 MG/1
TABLET ORAL
Qty: 6 ML | Refills: 11 | Status: CANCELLED | OUTPATIENT
Start: 2024-02-12

## 2024-02-13 ENCOUNTER — CARE COORDINATION (OUTPATIENT)
Dept: NEUROLOGY | Facility: CLINIC | Age: 29
End: 2024-02-13
Payer: COMMERCIAL

## 2024-02-13 DIAGNOSIS — G43.111 INTRACTABLE MIGRAINE WITH AURA WITH STATUS MIGRAINOSUS: ICD-10-CM

## 2024-02-13 RX ORDER — CEFUROXIME AXETIL 250 MG/1
TABLET ORAL
Qty: 3 KIT | Refills: 6 | Status: SHIPPED | OUTPATIENT
Start: 2024-02-13 | End: 2024-02-13

## 2024-02-13 RX ORDER — METHYLPREDNISOLONE 4 MG
TABLET, DOSE PACK ORAL
Qty: 21 TABLET | Refills: 0 | Status: SHIPPED | OUTPATIENT
Start: 2024-02-13 | End: 2024-02-20

## 2024-02-13 NOTE — PROGRESS NOTES
I called and spoke with Jose Eduardo about his headache.  He is reporting a terrible migraine for two weeks.  He denies any new symptoms such as vision changes,dizziness or weakness.  He has been taking imitrex and tylenol without much relief.  He has not been taking his emgality because of cost.  Tanna has been updated and has ordered a medrol dose pack and has discontinued his Imitrex.  She will see him in follow up 2/20 to discuss his medications and treatment plan further.

## 2024-02-14 ASSESSMENT — MIGRAINE DISABILITY ASSESSMENT (MIDAS)
HOW MANY DAYS IN THE PAST 3 MONTHS HAVE YOU HAD A HEADACHE: 40
ON A SCALE FROM 0-10 ON AVERAGE HOW PAINFUL WERE HEADACHES: 6
HOW MANY DAYS DID YOU MISS WORK OR SCHOOL BECAUSE OF HEADACHES: 0
HOW MANY DAYS DID YOU NOT DO HOUSEWORK BECAUSE OF HEADACHES: 0
HOW MANY DAYS WAS HOUSEWORK PRODUCTIVITY CUT IN HALF DUE TO HEADACHES: 12
HOW MANY DAYS WAS YOUR PRODUCTIVITY CUT IN HALF BECAUSE OF HEADACHES: 10
TOTAL SCORE: 24
HOW OFTEN WERE SOCIAL ACTIVITIES MISSED DUE TO HEADACHES: 2

## 2024-02-14 ASSESSMENT — HEADACHE IMPACT TEST (HIT 6)
WHEN YOU HAVE HEADACHES HOW OFTEN IS THE PAIN SEVERE: VERY OFTEN
HOW OFTEN DO HEADACHES LIMIT YOUR DAILY ACTIVITIES: SOMETIMES
WHEN YOU HAVE A HEADACHE HOW OFTEN DO YOU WISH YOU COULD LIE DOWN: VERY OFTEN
HOW OFTEN DID HEADACHS LIMIT CONCENTRATION ON WORK OR DAILY ACTIVITY: SOMETIMES
HOW OFTEN HAVE YOU FELT TOO TIRED TO WORK BECAUSE OF YOUR HEADACHES: SOMETIMES
HIT6 TOTAL SCORE: 63
HOW OFTEN HAVE YOU FELT FED UP OR IRRITATED BECAUSE OF YOUR HEADACHES: VERY OFTEN

## 2024-02-20 ENCOUNTER — TELEPHONE (OUTPATIENT)
Dept: NEUROLOGY | Facility: CLINIC | Age: 29
End: 2024-02-20

## 2024-02-20 ENCOUNTER — VIRTUAL VISIT (OUTPATIENT)
Dept: NEUROLOGY | Facility: CLINIC | Age: 29
End: 2024-02-20
Payer: COMMERCIAL

## 2024-02-20 DIAGNOSIS — G43.709 CHRONIC MIGRAINE WITHOUT AURA WITHOUT STATUS MIGRAINOSUS, NOT INTRACTABLE: Primary | ICD-10-CM

## 2024-02-20 DIAGNOSIS — G43.111 INTRACTABLE MIGRAINE WITH AURA WITH STATUS MIGRAINOSUS: ICD-10-CM

## 2024-02-20 PROCEDURE — 99213 OFFICE O/P EST LOW 20 MIN: CPT | Mod: 95 | Performed by: NURSE PRACTITIONER

## 2024-02-20 RX ORDER — CEFUROXIME AXETIL 250 MG/1
6 TABLET ORAL
Qty: 3 KIT | Refills: 11 | Status: SHIPPED | OUTPATIENT
Start: 2024-02-20

## 2024-02-20 RX ORDER — CLONAZEPAM 0.5 MG/1
0.5 TABLET ORAL
COMMUNITY
Start: 2024-01-24

## 2024-02-20 RX ORDER — BUSPIRONE HYDROCHLORIDE 10 MG/1
10 TABLET ORAL
COMMUNITY
Start: 2024-02-12

## 2024-02-20 RX ORDER — FLUTICASONE PROPIONATE AND SALMETEROL 50; 250 UG/1; UG/1
1 POWDER RESPIRATORY (INHALATION) PRN
COMMUNITY
Start: 2023-12-14

## 2024-02-20 NOTE — LETTER
2/20/2024       RE: Jose Eduardo Ryan  [address] [facility] [address]       Dear Colleague,    Thank you for referring your patient, Jose Eduardo Ryan, to the [facility]. Please see a copy of my visit note below.    Jose Eduardo is a 28 year old who is being evaluated via a billable video visit.      ASSESSMENT AND PLAN:  Chronic migraine   Plan:  Reinstate Emgality for migraine prevention. Side effects -Recommended a trial of migraine preventive treatment with Emgality. Side effects-allergic reaction or pain in the injection side or redness in the injection side. Unknown side effects with a long term use.   Rescue treatment   Sumatriptan injection as needed. Limit use to no more than 9 days per month  Nurtec as needed and Ok to to take it the same day with sumatriptan.   Follow up in 3-6 months or sooner if needed       Subjective  Jose Eduardo is a 28 year old, presenting for the following health issues:headache   RECHECK  Headaches not as bad for a year since last visit in Oct 2022. Frequency did not changes too much but severity a lot better.   Stopped Emgality due to cost -stopped in April/March 2023.   Total migraine headaches 8-12/month in the past 6 months. No new symptoms   In the last almost daily. Medrol dose pack helped and no headache since last Thursday. No side effects except elevated heart rate.   Sumatriptan injections work the best but costly $150 co-pay but works within 30 minutes migraine is better.   Sumatriptan oral and nasal did not work as well.   Nurtec helped but stopped due to -was not refilled   No nausea or vomiting with migraines     Got  last year.   Quit smoking and not as nauseous.   Anxiety on buspar daily , klonopin as needed and help with anxiety     He has a history of headache since the age of 3.  Strong family history of migraine headache and anxiety  in his mother He was on  amitriptyline, topiramate, valproate, verapamil in the past, fluoxetine, sertraline, Botox in the past was not effective   On buspar, klonopin,   His past abortive therapies were with Relpax, Maxalt, zolmitriptan and sumatriptan tablets -did not work  and injectable sumatriptan works.   Nurtec as needed and helps. Emgality stopped 1.5 years ago and was working and would like to try it again -it was very effective and no side effects.         Objective   Vitals - Patient Reported  Pain Score: No Pain (0)      Physical Exam   Patient sounds alert and no in apparent acute distress,  mentation appears normal, judgement and insight intact, normal speech.    I discussed all my recommendations with Jose Eduardo Ryan who verbalizes understanding and comfortable with the plan.    21 minutes spent on the date of the encounter doing video access, chart  review, meds review, treatment plan, documentation and further activities as noted above          Again, thank you for allowing me to participate in the care of your patient.      Sincerely,    BRANDI Partida CNP

## 2024-02-20 NOTE — PATIENT INSTRUCTIONS
Plan:  Reinstate Emgality for migraine prevention. Side effects -Recommended a trial of migraine preventive treatment with Emgality. Side effects-allergic reaction or pain in the injection side or redness in the injection side. Unknown side effects with a long term use.   Rescue treatment   Sumatriptan injection as needed. Limit use to no more than 9 days per month  Nurtec as needed and Ok to to take it the same day with sumatriptan.   Follow up in 3-6 months or sooner if needed

## 2024-02-20 NOTE — PROGRESS NOTES
Jose Eduardo is a 28 year old who is being evaluated via a billable video visit.      ASSESSMENT AND PLAN:  Chronic migraine   Plan:  Reinstate Emgality for migraine prevention. Side effects -Recommended a trial of migraine preventive treatment with Emgality. Side effects-allergic reaction or pain in the injection side or redness in the injection side. Unknown side effects with a long term use.   Rescue treatment   Sumatriptan injection as needed. Limit use to no more than 9 days per month  Nurtec as needed and Ok to to take it the same day with sumatriptan.   Follow up in 3-6 months or sooner if needed       Subjective   Jose Eduardo is a 28 year old, presenting for the following health issues:headache   RECHECK  Headaches not as bad for a year since last visit in Oct 2022. Frequency did not changes too much but severity a lot better.   Stopped Emgality due to cost -stopped in April/March 2023.   Total migraine headaches 8-12/month in the past 6 months. No new symptoms   In the last almost daily. Medrol dose pack helped and no headache since last Thursday. No side effects except elevated heart rate.   Sumatriptan injections work the best but costly $150 co-pay but works within 30 minutes migraine is better.   Sumatriptan oral and nasal did not work as well.   Nurtec helped but stopped due to -was not refilled   No nausea or vomiting with migraines     Got  last year.   Quit smoking and not as nauseous.   Anxiety on buspar daily , klonopin as needed and help with anxiety     He has a history of headache since the age of 3.  Strong family history of migraine headache and anxiety  in his mother He was on amitriptyline, topiramate, valproate, verapamil in the past, fluoxetine, sertraline, Botox in the past was not effective   On buspar, klonopin,   His past abortive therapies were with Relpax, Maxalt, zolmitriptan and sumatriptan tablets -did not work  and injectable sumatriptan works.   Nurtec as needed and helps. Emgality  stopped 1.5 years ago and was working and would like to try it again -it was very effective and no side effects.         Objective    Vitals - Patient Reported  Pain Score: No Pain (0)      Physical Exam   Patient sounds alert and no in apparent acute distress,  mentation appears normal, judgement and insight intact, normal speech.    I discussed all my recommendations with Jose Eduardo Ryan who verbalizes understanding and comfortable with the plan.    21 minutes spent on the date of the encounter doing video access, chart  review, meds review, treatment plan, documentation and further activities as noted above    BRANDI Ma, CNP [facility]  Headache certified  [facility]    Video-Visit Details    Type of service:  Video Visit   Originating Location (pt. Location): Home  Distant Location (provider location):  On-site  Platform used for Video Visit: Jane

## 2024-02-20 NOTE — TELEPHONE ENCOUNTER
Prior Authorization Retail Medication Request    Medication/Dose: Emgality 240 mg loading dose then 120 mg every 28 days  Diagnosis and ICD code (if different than what is on RX):    New/renewal/insurance change PA/secondary ins. PA:  Previously Tried and Failed:   amitriptyline, topiramate, valproate, verapamil in the past, fluoxetine, sertraline, Botox in the past was not effective     Rationale:  Total migraine headaches 8-12/month in the past 6 months. No new symptoms     Insurance   Primary: [facility]  Insurance ID:  [id number]

## 2024-02-20 NOTE — TELEPHONE ENCOUNTER
Prior Authorization Retail Medication Request    Medication/Dose: Nurtec 75 mg  Diagnosis and ICD code (if different than what is on RX):    New/renewal/insurance change PA/secondary ins. PA:  Previously Tried and Failed:   amitriptyline, topiramate, valproate, verapamil in the past, fluoxetine, sertraline, Botox in the past was not effective      Rationale:  Total migraine headaches 8-12/month in the past 6 months. No new symptoms      Insurance   Primary: [facility]  Insurance ID:  [id number]

## 2024-02-20 NOTE — NURSING NOTE
Is the patient currently in the state of [place]? YES    Visit mode:VIDEO    If the visit is dropped, the patient can be reconnected by: VIDEO VISIT: Text to cell phone:   Telephone Information:   Mobile [phone]       Will anyone else be joining the visit? NO  (If patient encounters technical issues they should call [phone] :[phone])    How would you like to obtain your AVS? MyChart    Are changes needed to the allergy or medication list? No, Pt stated no changes to allergies, and Pt stated no med changes    Reason for visit: BLANCA ALSTON

## 2024-02-21 ENCOUNTER — TELEPHONE (OUTPATIENT)
Dept: NEUROLOGY | Facility: CLINIC | Age: 29
End: 2024-02-21
Payer: COMMERCIAL

## 2024-02-21 NOTE — TELEPHONE ENCOUNTER
Left Voicemail (1st Attempt) and Sent Mychart (1st Attempt) for the patient to call back and schedule the following:    Appointment type: Return headache   Provider: Shon Cisse CNP  Return date: 8/20/2024  Specialty phone number: [phone]  Additional appointment(s) needed: na  Additonal Notes: WQ: appointment request     Ada Isaacs on 2/21/2024 at 2:04 PM

## 2024-02-23 ENCOUNTER — TELEPHONE (OUTPATIENT)
Dept: NEUROLOGY | Facility: CLINIC | Age: 29
End: 2024-02-23
Payer: COMMERCIAL

## 2024-03-05 NOTE — TELEPHONE ENCOUNTER
Central Prior Authorization Team   Phone: [phone]    PA Initiation    Medication: Nurtec 75 mg  Insurance Company: [employer] [place] - Phone [phone] Fax [phone]  Pharmacy Filling the Rx: [facility] #[id number] - [place], [place] - [address] AT [place] [address] & [facility]  Filling Pharmacy Phone: [phone]  Filling Pharmacy Fax:    Start Date: 3/5/2024       Samir Combs discharged to home accompanied by daughter.   Patient provided with the following educational materials upon discharge:  Post radial cath instructions.   Valuables and belongings sent with patient.   discharge instructions, medications and follow up appointments reviewed with patient and daughter.  Patient and daughter verbalized understanding.

## 2024-03-05 NOTE — TELEPHONE ENCOUNTER
Central Prior Authorization Team   Phone: [phone]    PA Initiation    Medication: Emgality 240 mg loading dose then 120 mg every 28 days  Insurance Company: [employer] [place] - Phone [phone] Fax [phone]  Pharmacy Filling the Rx: [facility] #[id number] - [place], [place] - [address] AT [place] [address] & [facility]  Filling Pharmacy Phone: [phone]  Filling Pharmacy Fax:    Start Date: 3/5/2024

## 2024-03-06 NOTE — TELEPHONE ENCOUNTER
Prior Authorization Approval    Authorization Effective Date: 2/5/2024  Authorization Expiration Date: 3/6/2025  Medication: Nurtec 75 mg-PA APPROVED   Approved Dose/Quantity: UP TO 16 TABLETS PER 30 DAYS   Reference #:     Insurance Company: [employer] [place] - Phone [phone] Fax [phone]  Expected CoPay:       CoPay Card Available:      Foundation Assistance Needed:    Which Pharmacy is filling the prescription (Not needed for infusion/clinic administered): [facility] #[id number] - [facility] [address] AT [place] [address] & [facility]  Pharmacy Notified:  Yes- **Instructed pharmacy to notify patient when script is ready to /ship.**  Patient Notified:  Yes

## 2024-03-06 NOTE — TELEPHONE ENCOUNTER
Prior Authorization Approval    Authorization Effective Date: 2/5/2024  Authorization Expiration Date: 3/6/2025  Medication: Emgality 240 mg loading dose then 120 mg every 28 days-PA APPROVED   Approved Dose/Quantity: UP TO 2 AUTO INJECTORS FOR THE FIRST MONTH THEN UP TO ONE AUTO INJECTOR PER 28 DAYS THEREAFTER  Reference #:     Insurance Company: [employer] [place] - Phone [phone] Fax [phone]  Expected CoPay:       CoPay Card Available:      Foundation Assistance Needed:    Which Pharmacy is filling the prescription (Not needed for infusion/clinic administered): [facility] #[id number] - [facility] [address] AT [address] [address] & [facility]  Pharmacy Notified:  Yes-**Instructed pharmacy to notify patient when script is ready to /ship.**  Patient Notified:  Yes

## 2024-09-15 ENCOUNTER — HEALTH MAINTENANCE LETTER (OUTPATIENT)
Age: 29
End: 2024-09-15

## 2025-02-13 ENCOUNTER — VIRTUAL VISIT (OUTPATIENT)
Dept: NEUROLOGY | Facility: CLINIC | Age: 30
End: 2025-02-13
Payer: COMMERCIAL

## 2025-02-13 VITALS — WEIGHT: 225 LBS | HEIGHT: 72 IN | BODY MASS INDEX: 30.48 KG/M2

## 2025-02-13 DIAGNOSIS — G43.709 CHRONIC MIGRAINE WITHOUT AURA WITHOUT STATUS MIGRAINOSUS, NOT INTRACTABLE: Primary | ICD-10-CM

## 2025-02-13 DIAGNOSIS — G43.111 INTRACTABLE MIGRAINE WITH AURA WITH STATUS MIGRAINOSUS: ICD-10-CM

## 2025-02-13 RX ORDER — ONDANSETRON 4 MG/1
4 TABLET, ORALLY DISINTEGRATING ORAL EVERY 8 HOURS PRN
Qty: 20 TABLET | Refills: 3 | Status: SHIPPED | OUTPATIENT
Start: 2025-02-13

## 2025-02-13 RX ORDER — CEFUROXIME AXETIL 250 MG/1
6 TABLET ORAL
Qty: 3 KIT | Refills: 11 | Status: SHIPPED | OUTPATIENT
Start: 2025-02-13

## 2025-02-13 RX ORDER — CEFUROXIME AXETIL 250 MG/1
6 TABLET ORAL
Qty: 3 KIT | Refills: 11 | Status: CANCELLED | OUTPATIENT
Start: 2025-02-13

## 2025-02-13 ASSESSMENT — HEADACHE IMPACT TEST (HIT 6)
WHEN YOU HAVE A HEADACHE HOW OFTEN DO YOU WISH YOU COULD LIE DOWN: SOMETIMES
HOW OFTEN HAVE YOU FELT TOO TIRED TO WORK BECAUSE OF YOUR HEADACHES: SOMETIMES
HOW OFTEN DID HEADACHS LIMIT CONCENTRATION ON WORK OR DAILY ACTIVITY: SOMETIMES
HOW OFTEN HAVE YOU FELT FED UP OR IRRITATED BECAUSE OF YOUR HEADACHES: SOMETIMES
HOW OFTEN DO HEADACHES LIMIT YOUR DAILY ACTIVITIES: SOMETIMES
WHEN YOU HAVE HEADACHES HOW OFTEN IS THE PAIN SEVERE: SOMETIMES
HIT6 TOTAL SCORE: 60

## 2025-02-13 ASSESSMENT — MIGRAINE DISABILITY ASSESSMENT (MIDAS)
TOTAL SCORE: 19
ON A SCALE FROM 0-10 ON AVERAGE HOW PAINFUL WERE HEADACHES: 6
HOW MANY DAYS DID YOU MISS WORK OR SCHOOL BECAUSE OF HEADACHES: 5
HOW MANY DAYS WAS YOUR PRODUCTIVITY CUT IN HALF BECAUSE OF HEADACHES: 4
HOW OFTEN WERE SOCIAL ACTIVITIES MISSED DUE TO HEADACHES: 1
HOW MANY DAYS WAS HOUSEWORK PRODUCTIVITY CUT IN HALF DUE TO HEADACHES: 4
HOW MANY DAYS IN THE PAST 3 MONTHS HAVE YOU HAD A HEADACHE: 20
HOW MANY DAYS DID YOU NOT DO HOUSEWORK BECAUSE OF HEADACHES: 5

## 2025-02-13 ASSESSMENT — PAIN SCALES - GENERAL: PAINLEVEL_OUTOF10: NO PAIN (0)

## 2025-02-13 NOTE — PROGRESS NOTES
Virtual Visit Details    Type of service:  Video Visit   Originating Location (pt. Location): Home  Distant Location (provider location):  Off-site  Platform used for Video Visit: [facility]    Headache Neurology Progress Note  February 13, 2025      Assessment/Plan:   Jose Eduardo Ryan is a 29 year old ***  Acute Treatment:  -For acute treatment of mild headache, take acetaminophen or ibuprofen  as needed. Do not exceed more than 14 days per month to avoid medication overuse.  -For acute treatment of moderate to severe headache, take sumatriptan inj at the onset of headache, with a repeat dose in two hours if needed. Do not exceed more than 9 days per month to avoid medication overuse.  Nurtec as needed   -For headache related nausea, or as a rescue medicine for headache, take ondansetron as needed.     Preventive Treatment:  -For headache prevention, take qulipta 60 mg daily -side effects nausea       Follow up in 6 months if stable or sooner if needed         XXX minutes spent on the date of the encounter doing video or face to face  access, chart  review, exam, results review,  meds review, treatment plan, documentation and further activities as noted above    BRANDI Ma, CNP [facility]      The longitudinal plan of care for Jose Eduardo was addressed during this visit. Due to the added complexity in care, I will continue to support Jose Eduardo in the subsequent management of this condition(s) and with the ongoing continuity of care of this condition(s).      Subjective:    Jose Eduardo Ryan returns for follow up of ***    Emgality last injection -9 months ago-insurance and costly even with insurance coverage. Was not consistent due to cost.   Headache 1 week roughly and moderate headaches may be once per week and no daily headaches. Headaches are better.   Loves Nurtec may be once per week and it helps. Reduces number of sumatriptan injections.    Sumatriptan inj for severe migraines with left eye vision loss -typical migraine symptoms for many years -there are some weeks when has to use sumatriptan inj and helps in 30-60 minutes. No side effects. Does not like injections but they do work.     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 Speech clear and fluent.   Cranial Nerves: Facial movements symmetric.   Motor: No abnormal movements.      Pertinent Investigations:    ***        10/12/2022    12:32 PM 2/14/2024     9:49 AM 2/13/2025     8:35 AM   HIT-6   When you have headaches, how often is the pain severe 11 11 10   How often do headaches limit your ability to do usual daily activities including household work, work, school, or social activities? 10 10 10   When you have a headache, how often do you wish you could lie down? 13 11 10   In the past 4 weeks, how often have you felt too tired to do work or daily activities because of your headaches 10 10 10   In the past 4 weeks, how often have you felt fed up or irritated because of your headaches 11 11 10   In the past 4 weeks, how often did headaches limit your ability to concentrate on work or daily activities 10 10 10   HIT-6 Total Score 65 63 60        Patient-reported           10/12/2022    12:34 PM 2/14/2024     9:50 AM 2/13/2025     8:49 AM   MIDAS - in the past three months:   On how many days did you miss work or school because of your headaches? 2 0 5   How many days was your productivity at work or school reduced by half or more because of your headaches? 6 10 4   On how many days did you not do household work because of your headaches? 5 0 5   How many days was your productivity in household work reduced by half or more because of your headaches? 8 12 4   On how many days did you miss  family, social, or leisure activities because of your headaches? 2 2 1   On how many days did you have a headache? 30 40 20   On a scale of 0-10, on average how painful were these headaches? 9 6 6   MIDAS Score 23 (IV - Severe Disability) 24 (IV - Severe Disability) 19 (III - Moderate Disability)

## 2025-02-13 NOTE — Clinical Note
"2/13/2025       RE: Jose Eduardo Ryan  [address] [facility] [address]     Dear Colleague,    Thank you for referring your patient, Jose Eduardo Ryan, to the [facility] [place] [facility]. Please see a copy of my visit note below.    Virtual Visit Details    Type of service:  Video Visit     Originating Location (pt. Location): {video visit patient location:622249::\"Home\"}  {PROVIDER LOCATION On-site should be selected for visits conducted from your clinic location or adjoining [facility] hospital, academic office, or other nearby [facility] building. Off-site should be selected for all other provider locations, including home:[address]}  Distant Location (provider location):  {virtual location provider:632891}  Platform used for Video Visit: {Virtual Visit Platforms:937033::\"[employer]\"}      Virtual Visit Details    Type of service:  Video Visit   Originating Location (pt. Location): Home  Distant Location (provider location):  Off-site  Platform used for Video Visit: [employer]    [facility]    Headache Neurology Progress Note  February 13, 2025      Assessment/Plan:   Jose Eduardo Ryan is a 29 year old ***  Acute Treatment:  -For acute treatment of mild headache, take acetaminophen or ibuprofen  as needed. Do not exceed more than 14 days per month to avoid medication overuse.  -For acute treatment of moderate to severe headache, take sumatriptan inj at the onset of headache, with a repeat dose in two hours if needed. Do not exceed more than 9 days per month to avoid medication overuse.  Nurtec as needed   -For headache related nausea, or as a rescue medicine for headache, take ondansetron as needed.     Preventive Treatment:  -For headache prevention, take qulipta 60 mg daily       Follow up in 6 months if stable or sooner if needed         XXX minutes spent on the date of the encounter doing video or face to face  access, " chart  review, exam, results review,  meds review, treatment plan, documentation and further activities as noted above    BRANDI Ma, CNP [facility]      The longitudinal plan of care for Jose Eduardo was addressed during this visit. Due to the added complexity in care, I will continue to support Jose Eduardo in the subsequent management of this condition(s) and with the ongoing continuity of care of this condition(s).      Subjective:    Jose Eduardo Ryan returns for follow up of ***    Emgality last injection -9 months ago-insurance and costly even with insurance coverage. Was not consistent due to cost.   Headache 1 week roughly and moderate headaches may be once per week and no daily headaches. Headaches are better.   Loves Nurtec may be once per week and it helps. Reduces number of sumatriptan injections.   Sumatriptan inj for severe migraines with left eye vision loss -typical migraine symptoms for many years -there are some weeks when has to use sumatriptan inj and helps in 30-60 minutes. No side effects. Does not like injections but they do work.     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 Speech clear and fluent.   Cranial Nerves: Facial movements symmetric.   Motor: No abnormal movements.      Pertinent Investigations:    ***        10/12/2022    12:32 PM 2/14/2024     9:49 AM 2/13/2025     8:35 AM   HIT-6   When you have headaches, how often is the pain severe 11 11 10   How often do headaches limit your ability to do usual daily activities including household work, work, school, or social activities? 10 10 10   When you have a headache, how often do you wish you could lie down? 13 11 10   In the past 4 weeks, how often have you felt too tired to do work or daily activities because  of your headaches 10 10 10   In the past 4 weeks, how often have you felt fed up or irritated because of your headaches 11 11 10   In the past 4 weeks, how often did headaches limit your ability to concentrate on work or daily activities 10 10 10   HIT-6 Total Score 65 63 60        Patient-reported           10/12/2022    12:34 PM 2/14/2024     9:50 AM 2/13/2025     8:49 AM   MIDAS - in the past three months:   On how many days did you miss work or school because of your headaches? 2 0 5   How many days was your productivity at work or school reduced by half or more because of your headaches? 6 10 4   On how many days did you not do household work because of your headaches? 5 0 5   How many days was your productivity in household work reduced by half or more because of your headaches? 8 12 4   On how many days did you miss family, social, or leisure activities because of your headaches? 2 2 1   On how many days did you have a headache? 30 40 20   On a scale of 0-10, on average how painful were these headaches? 9 6 6   MIDAS Score 23 (IV - Severe Disability) 24 (IV - Severe Disability) 19 (III - Moderate Disability)              Again, thank you for allowing me to participate in the care of your patient.      Sincerely,    BRANDI Partida CNP

## 2025-02-13 NOTE — NURSING NOTE
Current patient location:  at work     Is the patient currently in the state of [place]? YES    Visit mode: VIDEO    If the visit is dropped, the patient can be reconnected by:VIDEO VISIT: Text to cell phone:   Telephone Information:   Mobile [phone]       Will anyone else be joining the visit? NO  (If patient encounters technical issues they should call [phone] :[phone])    Are changes needed to the allergy or medication list? No    Are refills needed on medications prescribed by this physician? YES    Rooming Documentation:  Questionnaire(s) completed    Reason for visit: Follow Up    Namita ALSTON

## 2025-02-13 NOTE — PATIENT INSTRUCTIONS
Acute Treatment:  -For acute treatment of mild headache, take acetaminophen or ibuprofen  as needed. Do not exceed more than 14 days per month to avoid medication overuse.  -For acute treatment of moderate to severe headache, take sumatriptan inj at the onset of headache, with a repeat dose in two hours if needed. Do not exceed more than 9 days per month to avoid medication overuse.  Nurtec as needed   -For headache related nausea, or as a rescue medicine for headache, take ondansetron as needed.     Preventive Treatment:  -For headache prevention, take qulipta 60 mg daily -side effects nausea       Follow up in 6 months if stable or sooner if needed

## 2025-02-14 ENCOUNTER — TELEPHONE (OUTPATIENT)
Dept: NEUROLOGY | Facility: CLINIC | Age: 30
End: 2025-02-14

## 2025-02-16 NOTE — TELEPHONE ENCOUNTER
Retail Pharmacy Prior Authorization Team   Phone: [phone]    PRIOR AUTHORIZATION DENIED    Medication: NURTEC 75 MG PO TBDP  Insurance Company: [employer] [place] - Phone [phone] Fax [phone]  Denial Date: 2/14/2025  Denial Reason(s): MUST NOT BE USED IN COMBINATION WITH ANOTHER ACUTE MIGRAINE TREATMENT      Appeal Information: IF THE PROVIDER WOULD LIKE TO APPEAL THIS DECISION PLEASE PROVIDE THE PA TEAM WITH A LETTER OF MEDICAL NECESSITY      Patient Notified: NO

## 2025-02-18 NOTE — TELEPHONE ENCOUNTER
Medication Appeal Initiation    Medication: NURTEC 75 MG PO TBDP  Appeal Start Date:  2/18/2025  Insurance Company: [employer] [place]  Insurance Phone: [phone]  Insurance Fax: [phone]  Comments:       FAXED LETTER OF MEDICAL NECESSITY AND CHART NOTES TO INSURANCE

## 2025-03-04 NOTE — TELEPHONE ENCOUNTER
Called [employer] and spoke with Rep - states that the appeal request# [id number] has been upheld on 2/18/25. She will fax the denial letter.

## 2025-03-05 NOTE — TELEPHONE ENCOUNTER
MEDICATION APPEAL DENIED    Medication: NURTEC 75 MG PO TBDP  Insurance Company: [employer]  Denial Date: 2/24/2025  Denial Reason(s): MUST NOT BE USED IN COMBINATION WITH ANOTHER ACUTE MIGRAINE TREATMENT      Second Level Appeal Information:       Patient Notified: NO  Central Prior Authorization Team ONLY: Second level appeals will be managed by the clinic staff and provider. Please contact the [employer] Prior Authorization Team if additional information about the denial is needed.

## 2025-03-06 NOTE — TELEPHONE ENCOUNTER
Spoke to patient. He states that both medications are very valuable to his acute migraines. He takes Sumatriptan if Nurtec does not take his migraine away. Sumatriptan is very expensive so it is his last resort. He wants to pursue second level. Confirmed email to send authorization form to.     He stated that he received a denial letter, then an approval letter a date later. Unable to check with pharmacy since it would be too early to refill (last filled on 2/15). We will appeal with our current information on file.

## 2025-04-25 NOTE — NURSING NOTE
Chief Complaint   Patient presents with     Headache     [facility] RETURN 4 MO F/U    Juliann Geronimo CMA    
Waiting on results of CT.       Andres Samuels RN  05/09/19 2489
Previously negative

## 2025-08-03 ENCOUNTER — HEALTH MAINTENANCE LETTER (OUTPATIENT)
Age: 30
End: 2025-08-03

## 2025-08-14 ENCOUNTER — VIRTUAL VISIT (OUTPATIENT)
Dept: NEUROLOGY | Facility: CLINIC | Age: 30
End: 2025-08-14
Payer: COMMERCIAL

## 2025-08-14 VITALS — HEIGHT: 72 IN | BODY MASS INDEX: 29.12 KG/M2 | WEIGHT: 215 LBS

## 2025-08-14 DIAGNOSIS — G43.111 INTRACTABLE MIGRAINE WITH AURA WITH STATUS MIGRAINOSUS: ICD-10-CM

## 2025-08-14 DIAGNOSIS — G43.709 CHRONIC MIGRAINE WITHOUT AURA WITHOUT STATUS MIGRAINOSUS, NOT INTRACTABLE: Primary | ICD-10-CM

## 2025-08-14 RX ORDER — CEFUROXIME AXETIL 250 MG/1
6 TABLET ORAL
Qty: 3 KIT | Refills: 11 | Status: CANCELLED | OUTPATIENT
Start: 2025-08-14

## 2025-08-14 RX ORDER — CEFUROXIME AXETIL 250 MG/1
6 TABLET ORAL
Qty: 6 KIT | Refills: 11 | Status: SHIPPED | OUTPATIENT
Start: 2025-08-14

## 2025-08-14 ASSESSMENT — HEADACHE IMPACT TEST (HIT 6)
HOW OFTEN DO HEADACHES LIMIT YOUR DAILY ACTIVITIES: SOMETIMES
HOW OFTEN HAVE YOU FELT FED UP OR IRRITATED BECAUSE OF YOUR HEADACHES: VERY OFTEN
HOW OFTEN HAVE YOU FELT TOO TIRED TO WORK BECAUSE OF YOUR HEADACHES: SOMETIMES
WHEN YOU HAVE A HEADACHE HOW OFTEN DO YOU WISH YOU COULD LIE DOWN: VERY OFTEN
WHEN YOU HAVE HEADACHES HOW OFTEN IS THE PAIN SEVERE: SOMETIMES
HIT6 TOTAL SCORE: 62
HOW OFTEN DID HEADACHS LIMIT CONCENTRATION ON WORK OR DAILY ACTIVITY: SOMETIMES

## 2025-08-14 ASSESSMENT — MIGRAINE DISABILITY ASSESSMENT (MIDAS)
HOW MANY DAYS DID YOU MISS WORK OR SCHOOL BECAUSE OF HEADACHES: 3
HOW MANY DAYS WAS YOUR PRODUCTIVITY CUT IN HALF BECAUSE OF HEADACHES: 5
HOW MANY DAYS DID YOU NOT DO HOUSEWORK BECAUSE OF HEADACHES: 3
ON A SCALE FROM 0-10 ON AVERAGE HOW PAINFUL WERE HEADACHES: 8
TOTAL SCORE: 19
HOW MANY DAYS IN THE PAST 3 MONTHS HAVE YOU HAD A HEADACHE: 25
HOW OFTEN WERE SOCIAL ACTIVITIES MISSED DUE TO HEADACHES: 2
HOW MANY DAYS WAS HOUSEWORK PRODUCTIVITY CUT IN HALF DUE TO HEADACHES: 6

## 2025-08-14 ASSESSMENT — PAIN SCALES - GENERAL: PAINLEVEL_OUTOF10: NO PAIN (0)

## (undated) RX ORDER — BUPIVACAINE HYDROCHLORIDE 5 MG/ML
INJECTION, SOLUTION EPIDURAL; INTRACAUDAL
Status: DISPENSED
Start: 2017-10-05

## (undated) RX ORDER — BUPIVACAINE HYDROCHLORIDE 5 MG/ML
INJECTION, SOLUTION EPIDURAL; INTRACAUDAL
Status: DISPENSED
Start: 2017-04-27

## (undated) RX ORDER — BUPIVACAINE HYDROCHLORIDE 5 MG/ML
INJECTION, SOLUTION EPIDURAL; INTRACAUDAL
Status: DISPENSED
Start: 2018-04-02

## (undated) RX ORDER — BUPIVACAINE HYDROCHLORIDE 5 MG/ML
INJECTION, SOLUTION EPIDURAL; INTRACAUDAL
Status: DISPENSED
Start: 2017-07-13

## (undated) RX ORDER — BUPIVACAINE HYDROCHLORIDE 5 MG/ML
INJECTION, SOLUTION EPIDURAL; INTRACAUDAL
Status: DISPENSED
Start: 2017-12-28

## (undated) RX ORDER — BUPIVACAINE HYDROCHLORIDE 5 MG/ML
INJECTION, SOLUTION EPIDURAL; INTRACAUDAL
Status: DISPENSED
Start: 2017-02-02